# Patient Record
Sex: FEMALE | Race: WHITE | HISPANIC OR LATINO | Employment: FULL TIME | URBAN - METROPOLITAN AREA
[De-identification: names, ages, dates, MRNs, and addresses within clinical notes are randomized per-mention and may not be internally consistent; named-entity substitution may affect disease eponyms.]

---

## 2017-09-30 ENCOUNTER — HOSPITAL ENCOUNTER (EMERGENCY)
Facility: HOSPITAL | Age: 42
Discharge: HOME | End: 2017-09-30
Attending: EMERGENCY MEDICINE
Payer: COMMERCIAL

## 2017-09-30 VITALS
WEIGHT: 265 LBS | HEART RATE: 82 BPM | TEMPERATURE: 98 F | DIASTOLIC BLOOD PRESSURE: 69 MMHG | OXYGEN SATURATION: 98 % | SYSTOLIC BLOOD PRESSURE: 127 MMHG | HEIGHT: 67 IN | BODY MASS INDEX: 41.59 KG/M2 | RESPIRATION RATE: 20 BRPM

## 2017-09-30 DIAGNOSIS — K57.32 DIVERTICULITIS OF LARGE INTESTINE WITHOUT PERFORATION OR ABSCESS WITHOUT BLEEDING: Primary | ICD-10-CM

## 2017-09-30 LAB
ALBUMIN SERPL BCP-MCNC: 3.2 G/DL
ALP SERPL-CCNC: 79 U/L
ALT SERPL W/O P-5'-P-CCNC: 38 U/L
ANION GAP SERPL CALC-SCNC: 7 MMOL/L
AST SERPL-CCNC: 26 U/L
B-HCG UR QL: NEGATIVE
BASOPHILS # BLD AUTO: 0.03 K/UL
BASOPHILS NFR BLD: 0.2 %
BILIRUB SERPL-MCNC: 1 MG/DL
BILIRUB UR QL STRIP: NEGATIVE
BUN SERPL-MCNC: 7 MG/DL
CALCIUM SERPL-MCNC: 9.4 MG/DL
CHLORIDE SERPL-SCNC: 106 MMOL/L
CLARITY UR: CLEAR
CO2 SERPL-SCNC: 22 MMOL/L
COLOR UR: YELLOW
CREAT SERPL-MCNC: 0.8 MG/DL
DIFFERENTIAL METHOD: ABNORMAL
EOSINOPHIL # BLD AUTO: 0.3 K/UL
EOSINOPHIL NFR BLD: 2.2 %
ERYTHROCYTE [DISTWIDTH] IN BLOOD BY AUTOMATED COUNT: 14 %
EST. GFR  (AFRICAN AMERICAN): >60 ML/MIN/1.73 M^2
EST. GFR  (NON AFRICAN AMERICAN): >60 ML/MIN/1.73 M^2
GLUCOSE SERPL-MCNC: 114 MG/DL
GLUCOSE UR QL STRIP: NEGATIVE
HCT VFR BLD AUTO: 42.6 %
HGB BLD-MCNC: 14.3 G/DL
HGB UR QL STRIP: ABNORMAL
KETONES UR QL STRIP: ABNORMAL
LEUKOCYTE ESTERASE UR QL STRIP: NEGATIVE
LIPASE SERPL-CCNC: 11 U/L
LYMPHOCYTES # BLD AUTO: 1.1 K/UL
LYMPHOCYTES NFR BLD: 7.7 %
MCH RBC QN AUTO: 29.7 PG
MCHC RBC AUTO-ENTMCNC: 33.6 G/DL
MCV RBC AUTO: 88 FL
MONOCYTES # BLD AUTO: 1.3 K/UL
MONOCYTES NFR BLD: 9.4 %
NEUTROPHILS # BLD AUTO: 11.3 K/UL
NEUTROPHILS NFR BLD: 80.5 %
NITRITE UR QL STRIP: NEGATIVE
PH UR STRIP: 7 [PH] (ref 5–8)
PLATELET # BLD AUTO: 238 K/UL
PMV BLD AUTO: 9.8 FL
POTASSIUM SERPL-SCNC: 4.3 MMOL/L
PROT SERPL-MCNC: 7.9 G/DL
PROT UR QL STRIP: NEGATIVE
RBC # BLD AUTO: 4.82 M/UL
SODIUM SERPL-SCNC: 135 MMOL/L
SP GR UR STRIP: 1.01 (ref 1–1.03)
URN SPEC COLLECT METH UR: ABNORMAL
UROBILINOGEN UR STRIP-ACNC: NEGATIVE EU/DL
WBC # BLD AUTO: 14.05 K/UL

## 2017-09-30 PROCEDURE — 81003 URINALYSIS AUTO W/O SCOPE: CPT

## 2017-09-30 PROCEDURE — 63600175 PHARM REV CODE 636 W HCPCS: Performed by: EMERGENCY MEDICINE

## 2017-09-30 PROCEDURE — 96374 THER/PROPH/DIAG INJ IV PUSH: CPT

## 2017-09-30 PROCEDURE — 85025 COMPLETE CBC W/AUTO DIFF WBC: CPT

## 2017-09-30 PROCEDURE — 80053 COMPREHEN METABOLIC PANEL: CPT

## 2017-09-30 PROCEDURE — 83690 ASSAY OF LIPASE: CPT

## 2017-09-30 PROCEDURE — 81025 URINE PREGNANCY TEST: CPT

## 2017-09-30 PROCEDURE — 99284 EMERGENCY DEPT VISIT MOD MDM: CPT | Mod: 25

## 2017-09-30 RX ORDER — HYDROCODONE BITARTRATE AND ACETAMINOPHEN 5; 325 MG/1; MG/1
1 TABLET ORAL EVERY 4 HOURS PRN
Qty: 12 TABLET | Refills: 0 | Status: SHIPPED | OUTPATIENT
Start: 2017-09-30 | End: 2017-10-10

## 2017-09-30 RX ORDER — CIPROFLOXACIN 500 MG/1
500 TABLET ORAL 2 TIMES DAILY
Qty: 14 TABLET | Refills: 0 | Status: SHIPPED | OUTPATIENT
Start: 2017-09-30 | End: 2018-03-17

## 2017-09-30 RX ORDER — ONDANSETRON 2 MG/ML
4 INJECTION INTRAMUSCULAR; INTRAVENOUS
Status: COMPLETED | OUTPATIENT
Start: 2017-09-30 | End: 2017-09-30

## 2017-09-30 RX ORDER — METRONIDAZOLE 500 MG/1
500 TABLET ORAL 3 TIMES DAILY
Qty: 21 TABLET | Refills: 0 | Status: SHIPPED | OUTPATIENT
Start: 2017-09-30 | End: 2017-10-07

## 2017-09-30 RX ADMIN — ONDANSETRON 4 MG: 2 INJECTION INTRAMUSCULAR; INTRAVENOUS at 06:09

## 2017-09-30 NOTE — ED PROVIDER NOTES
"SCRIBE #1 NOTE: I, Kari Alves, am scribing for, and in the presence of, Alejandro Turpin MD. I have scribed the entire note.      History      Chief Complaint   Patient presents with    Abdominal Pain     reports lower abd pain x 2 days reports going to urgent care and was told she had a UTI and alot of gas in her abd        Review of patient's allergies indicates:   Allergen Reactions    Iodine and iodide containing products         HPI   HPI    9/30/2017, 6:19 AM   History obtained from the patient      History of Present Illness: Brooke Moss is a 42 y.o. female patient who presents to the Emergency Department for low abd pain which onset gradually 2 days ago. Pt describes the pain as "pressure". Pt states that she went to urgent care, had an X-ray and urine tests performed and was told she had ileus and a UTI. Symptoms are constant and moderate in severity. Pt states that passing gas helps mitigate her sxs. No other mitigating or exacerbating factors reported. Associated sxs include nausea. Pt states that she has been having trouble using the restroom and experiences "cold sweats" throughout the night. Patient denies any fever, chills, vomiting, diarrhea, constipation, hematochezia, dysuria, urinary frequency, hematuria, dizziness, and all other sxs at this time. No further complaints or concerns at this time.         Arrival mode: Personal vehicle      PCP: Primary Doctor No       Past Medical History:  History reviewed. No pertinent past medical history.    Past Surgical History:  History reviewed. No pertinent surgical history.      Family History:  History reviewed. No pertinent family history.    Social History:  Social History     Social History Main Topics    Smoking status: Never Smoker    Smokeless tobacco: Never Used    Alcohol use No    Drug use: No    Sexual activity: Unknown       ROS   Review of Systems   Constitutional: Negative for fever.        (+) "cold sweats"   HENT: Negative " "for sore throat.    Respiratory: Negative for shortness of breath.    Cardiovascular: Negative for chest pain.   Gastrointestinal: Positive for abdominal pain and nausea. Negative for blood in stool, constipation, diarrhea and vomiting.        (+) trouble with BM   Genitourinary: Negative for dysuria, frequency and hematuria.   Musculoskeletal: Negative for back pain.   Skin: Negative for rash.   Neurological: Negative for dizziness and weakness.   Hematological: Does not bruise/bleed easily.   All other systems reviewed and are negative.      Physical Exam      Initial Vitals [09/30/17 0552]   BP Pulse Resp Temp SpO2   138/86 104 20 98.6 °F (37 °C) 98 %      MAP       103.33          Physical Exam  Nursing Notes and Vital Signs Reviewed.  Constitutional: Patient is in no acute distress. Well-developed and well-nourished.  Head: Atraumatic. Normocephalic.  Eyes: PERRL. EOM intact. Conjunctivae are not pale. No scleral icterus.  ENT: Mucous membranes are moist. Oropharynx is clear and symmetric.    Neck: Supple. Full ROM. No lymphadenopathy.  Cardiovascular: Regular rate. Regular rhythm. No murmurs, rubs, or gallops. Distal pulses are 2+ and symmetric.  Pulmonary/Chest: No respiratory distress. Clear to auscultation bilaterally. No wheezing, rales, or rhonchi.  Abdominal: Soft and non-distended.  There is low abd tenderness.  No rebound, guarding, or rigidity.   Musculoskeletal: Moves all extremities. No obvious deformities. No edema.   Skin: Warm and dry.  Neurological:  Alert, awake, and appropriate.  Normal speech.  No acute focal neurological deficits are appreciated.  Psychiatric: Normal affect. Good eye contact. Appropriate in content.    ED Course    Procedures  ED Vital Signs:  Vitals:    09/30/17 0552 09/30/17 0708 09/30/17 0831   BP: 138/86 127/69    Pulse: 104 82    Resp: 20     Temp: 98.6 °F (37 °C)  98.2 °F (36.8 °C)   TempSrc: Oral  Oral   SpO2: 98% 98%    Weight: 120.2 kg (265 lb)     Height: 5' 7" " (1.702 m)         Abnormal Lab Results:  Labs Reviewed   CBC W/ AUTO DIFFERENTIAL - Abnormal; Notable for the following:        Result Value    WBC 14.05 (*)     Gran # 11.3 (*)     Mono # 1.3 (*)     Gran% 80.5 (*)     Lymph% 7.7 (*)     All other components within normal limits   COMPREHENSIVE METABOLIC PANEL - Abnormal; Notable for the following:     Sodium 135 (*)     CO2 22 (*)     Glucose 114 (*)     Albumin 3.2 (*)     Anion Gap 7 (*)     All other components within normal limits   URINALYSIS - Abnormal; Notable for the following:     Ketones, UA 1+ (*)     Occult Blood UA Trace (*)     All other components within normal limits   LIPASE   PREGNANCY TEST, URINE RAPID        All Lab Results:  Results for orders placed or performed during the hospital encounter of 09/30/17   CBC W/ AUTO DIFFERENTIAL   Result Value Ref Range    WBC 14.05 (H) 3.90 - 12.70 K/uL    RBC 4.82 4.00 - 5.40 M/uL    Hemoglobin 14.3 12.0 - 16.0 g/dL    Hematocrit 42.6 37.0 - 48.5 %    MCV 88 82 - 98 fL    MCH 29.7 27.0 - 31.0 pg    MCHC 33.6 32.0 - 36.0 g/dL    RDW 14.0 11.5 - 14.5 %    Platelets 238 150 - 350 K/uL    MPV 9.8 9.2 - 12.9 fL    Gran # 11.3 (H) 1.8 - 7.7 K/uL    Lymph # 1.1 1.0 - 4.8 K/uL    Mono # 1.3 (H) 0.3 - 1.0 K/uL    Eos # 0.3 0.0 - 0.5 K/uL    Baso # 0.03 0.00 - 0.20 K/uL    Gran% 80.5 (H) 38.0 - 73.0 %    Lymph% 7.7 (L) 18.0 - 48.0 %    Mono% 9.4 4.0 - 15.0 %    Eosinophil% 2.2 0.0 - 8.0 %    Basophil% 0.2 0.0 - 1.9 %    Differential Method Automated    Comp. Metabolic Panel   Result Value Ref Range    Sodium 135 (L) 136 - 145 mmol/L    Potassium 4.3 3.5 - 5.1 mmol/L    Chloride 106 95 - 110 mmol/L    CO2 22 (L) 23 - 29 mmol/L    Glucose 114 (H) 70 - 110 mg/dL    BUN, Bld 7 6 - 20 mg/dL    Creatinine 0.8 0.5 - 1.4 mg/dL    Calcium 9.4 8.7 - 10.5 mg/dL    Total Protein 7.9 6.0 - 8.4 g/dL    Albumin 3.2 (L) 3.5 - 5.2 g/dL    Total Bilirubin 1.0 0.1 - 1.0 mg/dL    Alkaline Phosphatase 79 55 - 135 U/L    AST 26 10 - 40  U/L    ALT 38 10 - 44 U/L    Anion Gap 7 (L) 8 - 16 mmol/L    eGFR if African American >60 >60 mL/min/1.73 m^2    eGFR if non African American >60 >60 mL/min/1.73 m^2   Lipase   Result Value Ref Range    Lipase 11 4 - 60 U/L   Urinalysis - Clean Catch   Result Value Ref Range    Specimen UA Urine, Clean Catch     Color, UA Yellow Yellow, Straw, Samantha    Appearance, UA Clear Clear    pH, UA 7.0 5.0 - 8.0    Specific Gravity, UA 1.010 1.005 - 1.030    Protein, UA Negative Negative    Glucose, UA Negative Negative    Ketones, UA 1+ (A) Negative    Bilirubin (UA) Negative Negative    Occult Blood UA Trace (A) Negative    Nitrite, UA Negative Negative    Urobilinogen, UA Negative <2.0 EU/dL    Leukocytes, UA Negative Negative   Pregnancy, urine rapid   Result Value Ref Range    Preg Test, Ur Negative          Imaging Results:  Imaging Results          CT Abdomen Pelvis  Without Contrast (Final result)  Result time 09/30/17 07:54:21    Final result by Virgilio Reynolds Jr., MD (09/30/17 07:54:21)                 Impression:             Sigmoid diverticulitis..    All CT scan at this facility use dose modulation, iterative reconstruction, and/or weight base dosing when appropriate to reduce radiation dose to as low as reasonably achievable.      Electronically signed by: VIRGILIO REYNOLDS  Date:     09/30/17  Time:    07:54              Narrative:    Exam: CT abdomen and pelvis without intravenous contrast.    Technique: Axial CT images performed through the abdomen and pelvis without intravenous contrast. Multiplanar reformats were performed and interpreted.    Comparison: None    History:    Lower abdominal pain.    Findings:         Lung bases are clear.  Heart is normal in size.  2.2 cm right renal cyst.  No renal calculus or obstructive uropathy.  Adrenal glands, contracted gallbladder and pancreas are normal.  Liver and spleen are unremarkable.    Multiple diverticula of the sigmoid colon with significant surrounding  mesenteric inflammation.  No free air or abscess formation.  Adnexal cysts likely ovarian.  Urinary bladder is normal.  Osseous structures are intact.                                      The Emergency Provider reviewed the vital signs and test results, which are outlined above.    ED Discussion     8:10 AM: Reassessed pt at this time.  Pt is awake, alert, and in NAD at this time. Discussed with pt all pertinent ED information and results. Discussed pt dx  and plan of tx. Gave pt all f/u and return to the ED instructions. All questions and concerns were addressed at this time. Pt expresses understanding of information and instructions, and is comfortable with plan to discharge. Pt is stable for discharge.        ED Medication(s):  Medications   ondansetron injection 4 mg (4 mg Intravenous Given 9/30/17 0632)       Discharge Medication List as of 9/30/2017  8:10 AM      START taking these medications    Details   ciprofloxacin HCl (CIPRO) 500 MG tablet Take 1 tablet (500 mg total) by mouth 2 (two) times daily., Starting Sat 9/30/2017, Print      hydrocodone-acetaminophen 5-325mg (NORCO) 5-325 mg per tablet Take 1 tablet by mouth every 4 (four) hours as needed for Pain., Starting Sat 9/30/2017, Until Tue 10/10/2017, Print      metronidazole (FLAGYL) 500 MG tablet Take 1 tablet (500 mg total) by mouth 3 (three) times daily., Starting Sat 9/30/2017, Until Sat 10/7/2017, Print             Follow-up Information     your doctor in [place] In 3 days.           [facility]    Specialty:  Emergency Medicine  Why:  As needed, If symptoms worsen  Contact information:  [address]  [facility] [address] [phone]                   Medical Decision Making    Medical Decision Making:   Clinical Tests:   Lab Tests: Ordered and Reviewed  Radiological Study: Ordered and Reviewed           Scribe Attestation:   Scribe #1: I performed the above scribed service and the documentation  accurately describes the services I performed. I attest to the accuracy of the note.    Attending:   Physician Attestation Statement for Scribe #1: I, Alejandro Turpin MD, personally performed the services described in this documentation, as scribed by Kari Alves, in my presence, and it is both accurate and complete.          Clinical Impression       ICD-10-CM ICD-9-CM   1. Diverticulitis of large intestine without perforation or abscess without bleeding K57.32 562.11       Disposition:   Disposition: Discharged  Condition: Stable         Alejandro Tuprin MD  09/30/17 1323

## 2018-01-12 ENCOUNTER — OFFICE VISIT (OUTPATIENT)
Dept: URGENT CARE | Facility: CLINIC | Age: 43
End: 2018-01-12
Payer: COMMERCIAL

## 2018-01-12 VITALS
WEIGHT: 210 LBS | TEMPERATURE: 98 F | BODY MASS INDEX: 32.96 KG/M2 | DIASTOLIC BLOOD PRESSURE: 86 MMHG | OXYGEN SATURATION: 95 % | RESPIRATION RATE: 18 BRPM | HEIGHT: 67 IN | SYSTOLIC BLOOD PRESSURE: 147 MMHG | HEART RATE: 94 BPM

## 2018-01-12 DIAGNOSIS — R05.9 COUGH: Primary | ICD-10-CM

## 2018-01-12 DIAGNOSIS — J11.1 INFLUENZA: ICD-10-CM

## 2018-01-12 LAB
CTP QC/QA: YES
FLUAV AG NPH QL: POSITIVE
FLUBV AG NPH QL: NEGATIVE

## 2018-01-12 PROCEDURE — 87804 INFLUENZA ASSAY W/OPTIC: CPT | Mod: QW,S$GLB,, | Performed by: EMERGENCY MEDICINE

## 2018-01-12 PROCEDURE — 99203 OFFICE O/P NEW LOW 30 MIN: CPT | Mod: S$GLB,,, | Performed by: EMERGENCY MEDICINE

## 2018-01-12 RX ORDER — BENZONATATE 200 MG/1
200 CAPSULE ORAL 3 TIMES DAILY PRN
Qty: 30 CAPSULE | Refills: 0 | Status: SHIPPED | OUTPATIENT
Start: 2018-01-12 | End: 2018-01-22

## 2018-01-12 RX ORDER — CODEINE PHOSPHATE AND GUAIFENESIN 10; 100 MG/5ML; MG/5ML
10 SOLUTION ORAL EVERY 6 HOURS PRN
Qty: 120 ML | Refills: 0 | Status: SHIPPED | OUTPATIENT
Start: 2018-01-12 | End: 2018-01-22

## 2018-01-12 NOTE — PROGRESS NOTES
"Subjective:       Patient ID: Brooke Moss is a 42 y.o. female.    Vitals:  height is 5' 7" (1.702 m) and weight is 95.3 kg (210 lb). Her oral temperature is 98.3 °F (36.8 °C). Her blood pressure is 147/86 (abnormal) and her pulse is 94. Her respiration is 18 and oxygen saturation is 95%.     Chief Complaint: Cough    Pt sick for over a week. She saw her PCP and got steroid shot and was neg for the flu. The cough got worse 3 days ago and she started feeling bad. No flu shot she does smoke. Tamiflu talk and smoking cessation counseling      Cough   This is a new problem. The current episode started in the past 7 days. The problem has been gradually worsening. The cough is productive of sputum. Associated symptoms include headaches and myalgias. Pertinent negatives include no chest pain, chills, ear pain, eye redness, fever, sore throat, shortness of breath or wheezing. She has tried OTC cough suppressant for the symptoms. The treatment provided no relief. Her past medical history is significant for environmental allergies.     Review of Systems   Constitution: Negative for chills, fever and malaise/fatigue.   HENT: Positive for congestion. Negative for ear pain, hoarse voice and sore throat.    Eyes: Negative for discharge and redness.   Cardiovascular: Negative for chest pain, dyspnea on exertion and leg swelling.   Respiratory: Positive for cough and sputum production. Negative for shortness of breath and wheezing.    Musculoskeletal: Positive for myalgias.   Gastrointestinal: Negative for abdominal pain and nausea.   Neurological: Positive for headaches.   Allergic/Immunologic: Positive for environmental allergies.       Objective:      Physical Exam   Constitutional: She is oriented to person, place, and time. She appears well-developed and well-nourished. She is cooperative.  Non-toxic appearance. She does not appear ill. No distress.   Occasional cough   HENT:   Head: Normocephalic and atraumatic.   Right " Ear: Hearing, tympanic membrane, external ear and ear canal normal.   Left Ear: Hearing, tympanic membrane, external ear and ear canal normal.   Nose: Mucosal edema and rhinorrhea present. No nasal deformity. No epistaxis. Right sinus exhibits no maxillary sinus tenderness and no frontal sinus tenderness. Left sinus exhibits no maxillary sinus tenderness and no frontal sinus tenderness.   Mouth/Throat: Uvula is midline and mucous membranes are normal. No trismus in the jaw. Normal dentition. No uvula swelling. Posterior oropharyngeal erythema present.   Eyes: Conjunctivae, EOM and lids are normal. Pupils are equal, round, and reactive to light. No scleral icterus.   Sclera clear bilat   Neck: Trachea normal, normal range of motion, full passive range of motion without pain and phonation normal. Neck supple.   Cardiovascular: Normal rate, regular rhythm, normal heart sounds, intact distal pulses and normal pulses.    Pulmonary/Chest: Effort normal and breath sounds normal. No respiratory distress.   Abdominal: Soft. Normal appearance and bowel sounds are normal. She exhibits no distension. There is no tenderness.   Musculoskeletal: Normal range of motion. She exhibits no edema or deformity.   Neurological: She is alert and oriented to person, place, and time. She exhibits normal muscle tone. Coordination normal.   Skin: Skin is warm, dry and intact. She is not diaphoretic. No pallor.   Psychiatric: She has a normal mood and affect. Her speech is normal and behavior is normal. Judgment and thought content normal. Cognition and memory are normal.   Nursing note and vitals reviewed.      Office Visit on 01/12/2018   Component Date Value Ref Range Status    Rapid Influenza A Ag 01/12/2018 Positive* Negative Final    Rapid Influenza B Ag 01/12/2018 Negative  Negative Final     Acceptable 01/12/2018 Yes   Final     Assessment:       1. Cough    2. Influenza        Plan:         Cough  -     POCT Influenza  A/B    Influenza    Other orders  -     benzonatate (TESSALON) 200 MG capsule; Take 1 capsule (200 mg total) by mouth 3 (three) times daily as needed for Cough.  Dispense: 30 capsule; Refill: 0  -     guaifenesin-codeine 100-10 mg/5 ml (TUSSI-ORGANIDIN NR)  mg/5 mL syrup; Take 10 mLs by mouth every 6 (six) hours as needed for Cough.  Dispense: 120 mL; Refill: 0

## 2018-01-12 NOTE — PATIENT INSTRUCTIONS
Please be aware as we discussed that narcotics can be addictive. I have given you a limited quantity to take as it is needed at this time. However take it sparingly and only when needed.  Influenza (Adult)    Influenza is also called the flu. It is a viral illness that affects the air passages of your lungs. It is different from the common cold. The flu can easily be passed from one to person to another. It may be spread through the air by coughing and sneezing. Or it can be spread by touching the sick person and then touching your own eyes, nose, or mouth.  The flu starts 1 to 3 days after you are exposed to the flu virus. It may last for 1 to 2 weeks but many people feel tired or fatigued for many weeks afterward. You usually dont need to take antibiotics unless you have a complication. This might be an ear or sinus infection or pneumonia.  Symptoms of the flu may be mild or severe. They can include extreme tiredness (wanting to stay in bed all day), chills, fevers, muscle aches, soreness with eye movement, headache, and a dry, hacking cough.  Home care  Follow these guidelines when caring for yourself at home:  · Avoid being around cigarette smoke, whether yours or other peoples.  · Acetaminophen or ibuprofen will help ease your fever, muscle aches, and headache. Dont give aspirin to anyone younger than 18 who has the flu. Aspirin can harm the liver.  · Nausea and loss of appetite are common with the flu. Eat light meals. Drink 6 to 8 glasses of liquids every day. Good choices are water, sport drinks, soft drinks without caffeine, juices, tea, and soup. Extra fluids will also help loosen secretions in your nose and lungs.  · Over-the-counter cold medicines will not make the flu go away faster. But the medicines may help with coughing, sore throat, and congestion in your nose and sinuses. Dont use a decongestant if you have high blood pressure.  · Stay home until your fever has been gone for at least 24 hours  without using medicine to reduce fever.  Follow-up care  Follow up with your healthcare provider, or as advised, if you are not getting better over the next week.  If you are age 65 or older, talk with your provider about getting a pneumococcal vaccine every 5 years. You should also get this vaccine if you have chronic asthma or COPD. All adults should get a flu vaccine every fall. Ask your provider about this.  When to seek medical advice  Call your healthcare provider right away if any of these occur:  · Cough with lots of colored mucus (sputum) or blood in your mucus  · Chest pain, shortness of breath, wheezing, or trouble breathing  · Severe headache, or face, neck, or ear pain  · New rash with fever  · Fever of 100.4°F (38°C) or higher, or as directed by your healthcare provider  · Confusion, behavior change, or seizure  · Severe weakness or dizziness  · You get a new fever or cough after getting better for a few days  Date Last Reviewed: 1/1/2017  © 0066-7857 [employer]. [address] [place] [address]. All rights reserved. This information is not intended as a substitute for professional medical care. Always follow your healthcare professional's instructions.        Kicking the Smoking Habit  If you smoke, quitting is one of the best changes you can make for your heart and your overall health. Your risk of heart attack goes down within one day of putting out that last cigarette. As you go longer without smoking, your risk goes down even more. Quitting isnt easy, but millions of people have done it. You can, too. Its never too late to quit.  Getting started  Boost your chances of success by deciding on your quit plan. Your health care provider and cardiac rehab team can help you develop this plan. Even if youve already quit, its easy to slip back into smoking.  Your plan can help you avoid and recover from relapse.  In any case, start by setting a date to quit within a month, and  do it.    Keys to your quit plan  · Talk to your healthcare provider about prescription medicines and nicotine replacement products that help stop the urge to smoke.   · Join a support group or quit smoking program. Talking with others about the challenges of quitting can help you get through them.  · Ask other smokers in your household to quit with you.  · Look for the cues in your life that you associate with smoking and avoid them.  Track your triggers  What gives you that Q-wobs-m-cigarette feeling? List all the situations that make you want a cigarette. Then think of other ways to deal with these situations. Here are some examples:  Situation How I'll handle it   Finishing a meal Get up from the table and take a walk   Having an argument Find a quiet place and breathe deeply   Feeling lonely or bored Call a friend to talk         Tips for quitting successfully  · List the benefits of quitting such as reducing heart risks and saving money. Keep this list and review it whenever you feel like smoking.  · Get support. Let your friends know you may call them to chat when you have an urge to smoke.  · If youve tried to quit before without success, this time avoid the triggers that may cause the relapse.  · Make the most of slip-ups. Try to learn from them, and then get back on track.  · Be accountable to your friends and your calendar so that you stay on track.  For family and friends  · Be supportive and patient. Quitting smoking can be difficult and stressful.  · If you smoke, nows a great time to quit. Even if you dont quit, never smoke around your loved one. Secondhand smoke is dangerous to his or her heart.  · The best goals are accomplished in teams. Remember that when your loved one states he or she wants to stop smoking.  Date Last Reviewed: 7/1/2016  © 5357-4394 [employer]. [address] [place] [address], [place] [address]. All rights reserved. This information is not intended as a substitute for  professional medical care. Always follow your healthcare professional's instructions.

## 2018-01-12 NOTE — LETTER
January 12, 2018      [facility]  [address] [address]  Phone: [phone]  Fax: [phone]       Patient: Brooke Moss   YOB: 1975  Date of Visit: 01/12/2018    To Whom It May Concern:    Mary Moss  was at [facility] on 01/12/2018. She has the flu and is contagious. She can return to work 1/16/18 if no fever for 24 hours. If you have any questions or concerns, or if I can be of further assistance, please do not hesitate to contact me.    Sincerely,    Mireille Skaggs MD

## 2018-03-17 ENCOUNTER — OFFICE VISIT (OUTPATIENT)
Dept: URGENT CARE | Facility: CLINIC | Age: 43
End: 2018-03-17
Payer: COMMERCIAL

## 2018-03-17 VITALS
SYSTOLIC BLOOD PRESSURE: 126 MMHG | RESPIRATION RATE: 19 BRPM | BODY MASS INDEX: 32.96 KG/M2 | TEMPERATURE: 97 F | HEART RATE: 92 BPM | HEIGHT: 67 IN | DIASTOLIC BLOOD PRESSURE: 76 MMHG | WEIGHT: 210 LBS | OXYGEN SATURATION: 96 %

## 2018-03-17 DIAGNOSIS — L05.01 PILONIDAL ABSCESS: Primary | ICD-10-CM

## 2018-03-17 PROCEDURE — 99214 OFFICE O/P EST MOD 30 MIN: CPT | Mod: S$GLB,,, | Performed by: PHYSICIAN ASSISTANT

## 2018-03-17 RX ORDER — SULFAMETHOXAZOLE AND TRIMETHOPRIM 800; 160 MG/1; MG/1
1 TABLET ORAL 2 TIMES DAILY
Qty: 14 TABLET | Refills: 0 | Status: SHIPPED | OUTPATIENT
Start: 2018-03-17 | End: 2018-03-24

## 2018-03-17 NOTE — LETTER
March 17, 2018      [facility] [place]  [address]  [place] [address]  Phone: [phone]  Fax: [phone]       Patient: Brooke Moss   YOB: 1975  Date of Visit: 03/17/2018    To Whom It May Concern:    Mary Moss  was at [facility] on 03/17/2018. She may return to work/school on 3/19/2018 with no restrictions. If you have any questions or concerns, or if I can be of further assistance, please do not hesitate to contact me.    Sincerely,    Winsome Tamayo PA-C

## 2018-03-17 NOTE — PROGRESS NOTES
"Subjective:       Patient ID: Brooke Moss is a 43 y.o. female.    Vitals:  height is 5' 7" (1.702 m) and weight is 95.3 kg (210 lb). Her oral temperature is 97 °F (36.1 °C). Her blood pressure is 126/76 and her pulse is 92. Her respiration is 19 and oxygen saturation is 96%.     Chief Complaint: Cyst (Pilonidal cyst on tailbone)    Cyst   This is a new problem. Episode onset: Thursday. The problem occurs constantly. The problem has been unchanged. Pertinent negatives include no abdominal pain, anorexia, arthralgias, change in bowel habit, chest pain, chills, congestion, coughing, fatigue, fever, headaches, nausea, rash, sore throat or vomiting. The symptoms are aggravated by bending and standing (sitting). She has tried nothing for the symptoms.     Review of Systems   Constitution: Negative for chills, fatigue and fever.   HENT: Negative for congestion and sore throat.    Eyes: Negative for blurred vision.   Cardiovascular: Negative for chest pain.   Respiratory: Negative for cough and shortness of breath.    Skin: Negative for color change and rash.        Abscess on buttock   Musculoskeletal: Negative for arthralgias, back pain and joint pain.   Gastrointestinal: Negative for abdominal pain, anorexia, change in bowel habit, diarrhea, nausea and vomiting.   Neurological: Negative for headaches.   Psychiatric/Behavioral: The patient is not nervous/anxious.        Objective:      Physical Exam   Constitutional: She is oriented to person, place, and time. Vital signs are normal. She appears well-developed and well-nourished. She does not appear ill. No distress.   HENT:   Head: Normocephalic and atraumatic.   Right Ear: External ear normal.   Left Ear: External ear normal.   Nose: Nose normal.   Eyes: Conjunctivae, EOM and lids are normal. Right eye exhibits no discharge. Left eye exhibits no discharge.   Neck: Normal range of motion. Neck supple.   Cardiovascular: Normal rate, regular rhythm and normal heart " sounds.  Exam reveals no gallop and no friction rub.    No murmur heard.  Pulmonary/Chest: Effort normal and breath sounds normal. No respiratory distress. She has no wheezes. She has no rales.   Musculoskeletal: Normal range of motion.   Neurological: She is alert and oriented to person, place, and time.   Skin: Skin is warm and dry. No rash noted. She is not diaphoretic. No erythema. No pallor.        Abscess at proximal left intergluteal cleft    Incision and drainage    Verbal consent obtained.  Prior to procedure correct patient, procedure, and site verfied.  Incision with 11 blade.   Local anesthesia: 3 mL 1% lidocaine without epi  Pilonidal abscess  Moderate amount of purulent and bloody drainage.  Probed with forceps.  Packed with iodoform  Cleansed and bandaged.  Pt tolerated procedure well.     Apply hot compresses frequently to promote drainage.  Oral antibiotics -- see med orders.  I & D procedure as above.  RTC in 2 days for packing removal.   Psychiatric: She has a normal mood and affect. Her behavior is normal.   Nursing note and vitals reviewed.      Assessment:       1. Pilonidal abscess        Plan:       Discussed wound care with patient. Discussed treatment options with patient. Patient expressed verbal understanding and agreement with treatment plan.     Pilonidal abscess  -     sulfamethoxazole-trimethoprim 800-160mg (BACTRIM DS) 800-160 mg Tab; Take 1 tablet by mouth 2 (two) times daily.  Dispense: 14 tablet; Refill: 0      Patient Instructions   -Please take antibiotic to completion.  -Return in 2 days to have packing removed.   -Return to clinic for any worsening symptoms as discussed.    Please follow up with your primary care provider within 2-5 days if your signs and symptoms have not resolved or worsen.     If your condition worsens or fails to improve we recommend that you receive another evaluation at the emergency room immediately or contact your primary medical clinic to discuss your  concerns.   You must understand that you have received an Urgent Care treatment only and that you may be released before all of your medical problems are known or treated. You, the patient, will arrange for follow up care as instructed.         Infected Pilonidal Cyst (Incision & Drainage)  A pilonidal cyst is a swelling that starts under the skin on the sacrum near the tail bone. It may look like a small dimple. It can fill with skin oils, hair, and dead skin cells. It may stay small or grow larger. Because it often has an opening to the surface, it may become infected with normal skin bacteria.  Cause  The cause of pilonidal cysts has been debated since they were first recognized. It may be present at birth and go unnoticed. Injury, rubbing, or skin irritation may also cause pilonidal cysts. It can also be caused by an ingrown hair. Most likely, the cause is a combination of these things. Because some injury or irritation can lead to pilonidal cysts, it can be more common in people who sit or drive a lot for work.  Symptoms  A pilonidal cyst may be small and painless. If it is inflamed or infected, you may have these symptoms:  · Swelling  · Irritation or redness  · Pain  · Drainage  The cyst can swell and drain on its own. The swelling and drainage can come and go.  Treatment  Your pilonidal cyst was drained with a small incision using local anesthesia.  After the incision and drainage, gauze packing may be inserted into the opening. If so, it should be removed in 1 to 2 days. Antibiotics are not required in the treatment of a simple abscess, unless the infection is spreading into the skin around the wound. The wound will take about 1 to 2 weeks to heal depending on the size of the cyst.  Home care  Wound care  · Pus may drain from the wound for the first few days. Cover the wound with a clean dry bandage. Change the bandage if it becomes soaked with blood or pus, or if it gets soiled with feces or urine.  · Sit  in a tub filled with about 6 inches of hot water. Keep the water hot for 10 to15 minutes.  · If gauze packing was placed inside the cyst cavity, you may be told to remove it yourself. You may do this in the shower. Once the packing is removed, you should wash the area carefully in the shower once a day. Do this until the skin opening has closed. It is okay to direct the shower spray directly into the opening if this is not too painful.  Medicines  · Take acetaminophen or ibuprofen for pain, unless you were given a different pain medicine to use. Talk with your doctor before using these medicines if you have chronic liver or kidney disease or have ever had a stomach ulcer or gastrointestinal bleeding. Also talk with your doctor if you are taking blood thinner medicines.  · If you were given antibiotics, take them until they are gone. It is important to finish the antibiotics even if the wound looks better. This is to make sure the infection has cleared.  · Use antibiotic cream or ointment if your healthcare provider tells you to do so.  Prevention  Once this infection has healed, the following may decrease the risk of future infections:  · Keep the area of the cyst clean by bathing or showering daily.  · Avoid tight-fitting clothing to minimize perspiration and irritation of the skin.  · Recurrent pilonidal cysts may be completely removed by surgery. But this can only be done at a time when there is no infection. Ask your doctor for more information.  Follow-up care  Follow up with your healthcare provider, or as advised. If a gauze packing was inserted in your wound, it should be removed in 1 to 2 days. Check your wound every day for the signs listed below.  When to seek medical advice  Call your healthcare provider right away if any of these occur:  · Pus continues to come from the cyst for 5 days after the incision  · Increasing redness, local pain, or swelling  · Fever of 100.4°F (38.0°C) or higher for more than  2 days, or as directed by your healthcare provider  Date Last Reviewed: 12/1/2016  © 6812-3033 [employer]. [address] [place] [address], [place] [address]. All rights reserved. This information is not intended as a substitute for professional medical care. Always follow your healthcare professional's instructions.

## 2018-03-17 NOTE — PATIENT INSTRUCTIONS
-Please take antibiotic to completion.  -Return in 2 days to have packing removed.   -Return to clinic for any worsening symptoms as discussed.    Please follow up with your primary care provider within 2-5 days if your signs and symptoms have not resolved or worsen.     If your condition worsens or fails to improve we recommend that you receive another evaluation at the emergency room immediately or contact your primary medical clinic to discuss your concerns.   You must understand that you have received an Urgent Care treatment only and that you may be released before all of your medical problems are known or treated. You, the patient, will arrange for follow up care as instructed.         Infected Pilonidal Cyst (Incision & Drainage)  A pilonidal cyst is a swelling that starts under the skin on the sacrum near the tail bone. It may look like a small dimple. It can fill with skin oils, hair, and dead skin cells. It may stay small or grow larger. Because it often has an opening to the surface, it may become infected with normal skin bacteria.  Cause  The cause of pilonidal cysts has been debated since they were first recognized. It may be present at birth and go unnoticed. Injury, rubbing, or skin irritation may also cause pilonidal cysts. It can also be caused by an ingrown hair. Most likely, the cause is a combination of these things. Because some injury or irritation can lead to pilonidal cysts, it can be more common in people who sit or drive a lot for work.  Symptoms  A pilonidal cyst may be small and painless. If it is inflamed or infected, you may have these symptoms:  · Swelling  · Irritation or redness  · Pain  · Drainage  The cyst can swell and drain on its own. The swelling and drainage can come and go.  Treatment  Your pilonidal cyst was drained with a small incision using local anesthesia.  After the incision and drainage, gauze packing may be inserted into the opening. If so, it should be removed in 1  to 2 days. Antibiotics are not required in the treatment of a simple abscess, unless the infection is spreading into the skin around the wound. The wound will take about 1 to 2 weeks to heal depending on the size of the cyst.  Home care  Wound care  · Pus may drain from the wound for the first few days. Cover the wound with a clean dry bandage. Change the bandage if it becomes soaked with blood or pus, or if it gets soiled with feces or urine.  · Sit in a tub filled with about 6 inches of hot water. Keep the water hot for 10 to15 minutes.  · If gauze packing was placed inside the cyst cavity, you may be told to remove it yourself. You may do this in the shower. Once the packing is removed, you should wash the area carefully in the shower once a day. Do this until the skin opening has closed. It is okay to direct the shower spray directly into the opening if this is not too painful.  Medicines  · Take acetaminophen or ibuprofen for pain, unless you were given a different pain medicine to use. Talk with your doctor before using these medicines if you have chronic liver or kidney disease or have ever had a stomach ulcer or gastrointestinal bleeding. Also talk with your doctor if you are taking blood thinner medicines.  · If you were given antibiotics, take them until they are gone. It is important to finish the antibiotics even if the wound looks better. This is to make sure the infection has cleared.  · Use antibiotic cream or ointment if your healthcare provider tells you to do so.  Prevention  Once this infection has healed, the following may decrease the risk of future infections:  · Keep the area of the cyst clean by bathing or showering daily.  · Avoid tight-fitting clothing to minimize perspiration and irritation of the skin.  · Recurrent pilonidal cysts may be completely removed by surgery. But this can only be done at a time when there is no infection. Ask your doctor for more information.  Follow-up  care  Follow up with your healthcare provider, or as advised. If a gauze packing was inserted in your wound, it should be removed in 1 to 2 days. Check your wound every day for the signs listed below.  When to seek medical advice  Call your healthcare provider right away if any of these occur:  · Pus continues to come from the cyst for 5 days after the incision  · Increasing redness, local pain, or swelling  · Fever of 100.4°F (38.0°C) or higher for more than 2 days, or as directed by your healthcare provider  Date Last Reviewed: 12/1/2016  © 3646-1218 [employer]. [address] [place] [address], [place] [address]. All rights reserved. This information is not intended as a substitute for professional medical care. Always follow your healthcare professional's instructions.

## 2018-03-19 ENCOUNTER — CLINICAL SUPPORT (OUTPATIENT)
Dept: URGENT CARE | Facility: CLINIC | Age: 43
End: 2018-03-19
Payer: COMMERCIAL

## 2018-03-19 VITALS
HEIGHT: 67 IN | HEART RATE: 89 BPM | RESPIRATION RATE: 18 BRPM | WEIGHT: 215 LBS | DIASTOLIC BLOOD PRESSURE: 70 MMHG | BODY MASS INDEX: 33.74 KG/M2 | OXYGEN SATURATION: 97 % | SYSTOLIC BLOOD PRESSURE: 132 MMHG

## 2018-03-19 DIAGNOSIS — L05.01 PILONIDAL CYST WITH ABSCESS: Primary | ICD-10-CM

## 2018-03-19 PROCEDURE — 99499 UNLISTED E&M SERVICE: CPT | Mod: S$GLB,,, | Performed by: FAMILY MEDICINE

## 2018-03-19 RX ORDER — MUPIROCIN 20 MG/G
OINTMENT TOPICAL 4 TIMES DAILY
Qty: 1 TUBE | Refills: 0 | Status: ON HOLD | OUTPATIENT
Start: 2018-03-19 | End: 2023-01-19 | Stop reason: HOSPADM

## 2018-03-19 NOTE — PROGRESS NOTES
"Subjective:       Patient ID: Brooke Moss is a 43 y.o. female.    Vitals:  height is 5' 7" (1.702 m) and weight is 97.5 kg (215 lb). Her blood pressure is 132/70 and her pulse is 89. Her respiration is 18 and oxygen saturation is 97%.     Chief Complaint: Wound Check    Pt presents for check on an abscess  Packing fell out accidentally on saturday      Wound Check   She was originally treated 2 to 3 days ago. Previous treatment included I&D of abscess. Her temperature was unmeasured prior to arrival. There has been bloody and colored discharge from the wound. The redness has not changed. The swelling has not changed.     Review of Systems   Constitution: Negative for chills and fever.   HENT: Negative for sore throat.    Respiratory: Negative for shortness of breath.    Skin: Positive for color change. Negative for itching and rash.   Musculoskeletal: Negative for joint pain.       Objective:      Physical Exam   Constitutional: She is oriented to person, place, and time. She appears well-developed and well-nourished.   HENT:   Head: Normocephalic and atraumatic. Head is without abrasion, without contusion and without laceration.   Nose: Nose normal.   Eyes: Conjunctivae, EOM and lids are normal. Pupils are equal, round, and reactive to light.   Neck: Trachea normal, full passive range of motion without pain and phonation normal. Neck supple.   Cardiovascular: Normal rate, regular rhythm and normal heart sounds.    Pulmonary/Chest: Effort normal and breath sounds normal. No stridor. No respiratory distress.   Musculoskeletal: Normal range of motion.   Neurological: She is alert and oriented to person, place, and time.   Skin: Skin is warm, dry and intact. Capillary refill takes less than 2 seconds. No abrasion, no bruising, no burn, no ecchymosis, no laceration, no lesion and no rash noted. No erythema.        Psychiatric: She has a normal mood and affect. Her speech is normal and behavior is normal. Judgment " "and thought content normal. Cognition and memory are normal.   Nursing note and vitals reviewed.      Incision & Drainage  Date/Time: 3/19/2018 4:53 PM  Performed by: ANJEL GOTTLIEB.  Authorized by: ANJEL GOTTLIEB     Time out: Immediately prior to procedure a "time out" was called to verify the correct patient, procedure, equipment, support staff and site/side marked as required.    Consent Done?:  Yes (Verbal)    Type:  Abscess  Body area:  Anogenital  Location details:  Gluteal cleft  Anesthesia:  Local infiltration  Local anesthetic:  Lidocaine 2% without epinephrinelidocaine 2% without epinephrine  Anesthetic total (ml):  5  Scalpel size:  11  Incision type:  Single straight  Complexity:  Simple  Drainage:  Pus and serosanguinous  Drainage amount:  Moderate  Wound treatment:  Incision, drainage, expression of material and wound packed  Packing material:  1/2 in iodoform gauze (pt did not have reaction to iodoform previously packed with)  Patient tolerance:  Patient tolerated the procedure well with no immediate complications        Assessment:       1. Pilonidal cyst with abscess        Plan:         Pilonidal cyst with abscess  -     mupirocin (BACTROBAN) 2 % ointment; Apply topically 4 (four) times daily.  Dispense: 1 Tube; Refill: 0    Other orders  -     INCISION AND DRAINAGE    return in 2 days for packing change.   Given written Rx for 3 more days of bactrim.     Patient Instructions   Please return in 2 days for packing removal.   Keep the area clean do not remove packing.   Infected Pilonidal Cyst (Incision & Drainage)  A pilonidal cyst is a swelling that starts under the skin on the sacrum near the tail bone. It may look like a small dimple. It can fill with skin oils, hair, and dead skin cells. It may stay small or grow larger. Because it often has an opening to the surface, it may become infected with normal skin bacteria.  Cause  The cause of pilonidal cysts has been debated since they were " first recognized. It may be present at birth and go unnoticed. Injury, rubbing, or skin irritation may also cause pilonidal cysts. It can also be caused by an ingrown hair. Most likely, the cause is a combination of these things. Because some injury or irritation can lead to pilonidal cysts, it can be more common in people who sit or drive a lot for work.  Symptoms  A pilonidal cyst may be small and painless. If it is inflamed or infected, you may have these symptoms:  · Swelling  · Irritation or redness  · Pain  · Drainage  The cyst can swell and drain on its own. The swelling and drainage can come and go.  Treatment  Your pilonidal cyst was drained with a small incision using local anesthesia.  After the incision and drainage, gauze packing may be inserted into the opening. If so, it should be removed in 1 to 2 days. Antibiotics are not required in the treatment of a simple abscess, unless the infection is spreading into the skin around the wound. The wound will take about 1 to 2 weeks to heal depending on the size of the cyst.  Home care  Wound care  · Pus may drain from the wound for the first few days. Cover the wound with a clean dry bandage. Change the bandage if it becomes soaked with blood or pus, or if it gets soiled with feces or urine.  · Sit in a tub filled with about 6 inches of hot water. Keep the water hot for 10 to15 minutes.  · If gauze packing was placed inside the cyst cavity, you may be told to remove it yourself. You may do this in the shower. Once the packing is removed, you should wash the area carefully in the shower once a day. Do this until the skin opening has closed. It is okay to direct the shower spray directly into the opening if this is not too painful.  Medicines  · Take acetaminophen or ibuprofen for pain, unless you were given a different pain medicine to use. Talk with your doctor before using these medicines if you have chronic liver or kidney disease or have ever had a stomach  ulcer or gastrointestinal bleeding. Also talk with your doctor if you are taking blood thinner medicines.  · If you were given antibiotics, take them until they are gone. It is important to finish the antibiotics even if the wound looks better. This is to make sure the infection has cleared.  · Use antibiotic cream or ointment if your healthcare provider tells you to do so.  Prevention  Once this infection has healed, the following may decrease the risk of future infections:  · Keep the area of the cyst clean by bathing or showering daily.  · Avoid tight-fitting clothing to minimize perspiration and irritation of the skin.  · Recurrent pilonidal cysts may be completely removed by surgery. But this can only be done at a time when there is no infection. Ask your doctor for more information.  Follow-up care  Follow up with your healthcare provider, or as advised. If a gauze packing was inserted in your wound, it should be removed in 1 to 2 days. Check your wound every day for the signs listed below.  When to seek medical advice  Call your healthcare provider right away if any of these occur:  · Pus continues to come from the cyst for 5 days after the incision  · Increasing redness, local pain, or swelling  · Fever of 100.4°F (38.0°C) or higher for more than 2 days, or as directed by your healthcare provider  Date Last Reviewed: 12/1/2016  © 5942-6837 [employer]. [address] [place] [address], [place] [address]. All rights reserved. This information is not intended as a substitute for professional medical care. Always follow your healthcare professional's instructions.        Pilonidal Cyst, Infected (Antibiotic Treatment)  A pilonidal cyst is a swelling that starts under the skin on the sacrum near the tailbone. It may look like a small dimple. It can fill with skin oils, hair, and dead skin cells. It may stay small or grow larger. It may become infected with normal skin bacteria because it often has an opening  to the surface.  Causes  The cause of pilonidal cysts has been debated since they were first recognized. A cyst may be present at birth and go unnoticed. Injury, rubbing, or skin irritation may also cause pilonidal cysts. It can also be caused by an ingrown hair. The cause is most likely a combination of these things. Because some injury or irritation can lead to pilonidal cysts, they can be more common in people who sit or drive a lot for work.  Symptoms  A pilonidal cyst may be small and painless. If it becomes inflamed or infected, you may have these symptoms:  · Swelling  · Irritation or redness  · Pain  · Drainage  The cyst can swell and drain on its own. The swelling and drainage can come and go.  Treatment  A limited infection can be treated with antibiotics and home care. You have been given antibiotics to treat your infected pilonidal cyst.  Home care  The following guidelines will help you care for your wound at home:  · Sit in a tub filled with about 6 inches of hot water. Keep the water hot for 10 to 15 minutes.  · Don't squeeze the pilonidal cyst or stick a needle in it to drain it. This will make the infection worse, or spread it.  · Cover the cyst with a pad or something similar to keep it from becoming more irritated, damaged, and painful.  Medicines  · Take acetaminophen or ibuprofen for pain, unless you were given a different pain medicine to use. Talk with your doctor before using these medicines if you have chronic liver or kidney disease, or have ever had a stomach ulcer or digestive bleeding. Also talk with your doctor if you are taking blood thinner medicines.  · Take the antibiotics that you were prescribed until they are all gone. To make sure the infection is cured, it is important to finish the antibiotics even if the wound looks better.  · Use antibiotic cream or ointment if your healthcare provider tells you to.    Preventing future infections  Once this infection has healed, follow  these tips to lower the risk for another infection:  · Keep the area of the cyst clean by bathing or showering every day.  · Don't wear tight-fitting clothing. This will help lessen sweat and irritation of the skin.  · You may need surgery to completely remove the cyst if it keeps coming back. The surgery can only be done when the cyst is not infected. Ask your doctor for more information.  · Watch for signs of infection listed below so that treatment may be started early.  Follow-up care  Follow up with your healthcare provider, or as advised. Check your wound every day for the signs listed below.  When to seek medical advice  Call your healthcare provider right away if any of these occur:  · Pus coming from the cyst  · Increasing local pain, redness, or swelling  · Fever of 100.4°F (38.0°C) or higher for more than 2 days, or as directed by your healthcare provider  Date Last Reviewed: 12/1/2016  © 2650-6952 [employer]. [address] [place] [address]. All rights reserved. This information is not intended as a substitute for professional medical care. Always follow your healthcare professional's instructions.        Wound Care After Packing Removal or Replacement  Packing is a special type of dressing placed inside a wound to help it heal. Once the packing is removed, you need to care for your wound. Good wound care helps prevent infection. Be sure to keep all follow-up appointments with your healthcare provider. Follow these instructions to take care of the wound once youre at home.  Home care  Your healthcare provider may prescribe medicines for pain. Or he or she may suggest an over-the-counter (OTC) pain reliever, such as ibuprofen or acetaminophen. Talk with your healthcare provider before taking any OTC medicines if you have chronic liver or kidney disease, a stomach ulcer, or gastrointestinal bleeding. In certain cases, you may also need to take antibiotics to help prevent  infection. If so, take them exactly as directed for as long as directed. Dont stop taking your antibiotics until they are all gone, even if you feel better.  Here are some general care guidelines for your wound:  · Follow your healthcare providers instructions on how to care for your wound. Always wash your hands with soap and warm water before and after tending to your wound.  · If a bandage was put on, remove and change it once a day or as directed. If the bandage gets wet or dirty, replace it as soon as possible with a new bandage. Use a clean cloth to gently pat the wound dry.  · If your packing was replaced, a small piece of gauze may hang from the wound. It allows fluid to continue draining from the wound. You may need to use an ointment or cream to keep the packing from sticking to the bandage.  · Don't bathe in a tub or soak your wound until your healthcare provider says its OK. Take showers or sponge baths instead. Avoid swimming.  · Dont scratch, rub, scrub, or pick your wound.  · Check your wound daily for the signs of infection listed below.  If your wound was closed, it was likely with one of four types of closures. These include stitches (sutures), strips of surgical tape, skin glue, and staples. Your healthcare provider will decide on the best closure based on the size and location of your wound. Each type of closure needs specific care.  · Sutures. You may want to clean the wound daily after the first 2 to 3 days. To do this, remove the bandage and gently wash the area with soap and warm water. After cleaning, apply a thin layer of antibiotic ointment if recommended. Then put on a new bandage. Sutures on the outside of the skin usually need to be removed by your healthcare provider.  · Surgical tape. Keep the area dry. If it gets wet, blot it dry with a towel. Surgical tape closures usually fall off within 7 to 10 days. If they have not fallen off after 10 days, you can remove them yourself. To  remove the tape, use mineral oil or petroleum jelly on a cotton ball to gently rub the adhesive.  · Skin glue. You may shower or bathe as usual. But do not use soaps, lotions, or ointments on the wound area. Do not scrub the wound. After bathing, pat the wound dry with a soft towel. Do not apply liquids like peroxide, ointments, or creams to the wound while the strips or film is in place. Don't scratch, rub, or pick at the strips or film. Don't put tape directly over the strips or film. Skin adhesive film will fall off naturally in 5 to 10 days. If it does not peel off in 10 days, gently rub petroleum jelly or an ointment onto the film.  · Staples. Take showers or sponge baths. Don't take tub baths. Don't use lotions on the wound area. The area may be cleaned with soap and water 2 to 3 days after the wound was stapled. Don't scrub the wound. Pat it dry with a clean soft cloth or towel. You can use antibiotic ointment if your healthcare provider tells you to. Staples will need to be removed in 10 to 14 days.  Follow-up care  Follow up with your healthcare provider, or as directed. If your packing was replaced, you may need another visit within 1 to 3 days to remove or replace it. If you have sutures or staples, return for their removal as directed.  When to seek medical advice  Call your health care provider right away if you have signs of infection:  · Fever of 1 degree or more above your normal temperature, or as directed by your healthcare provider  · Increasing pain in the wound or pain that doesnt get better even with pain medicine  · Increasing redness or swelling  · Pus or bad-smelling drainage from the wound  Also call your healthcare provider right away if any of these occur:  · Your wound bleeds more than a small amount or wont stop bleeding.  · The edges of your wound come apart.  · You have numbness or weakness in the wound area that doesnt go away.  Date Last Reviewed: 10/2/2015  © 5433-5067 [employer]. [address] [place] [address], [place] [address]. All rights reserved. This information is not intended as a substitute for professional medical care. Always follow your healthcare professional's instructions.

## 2018-03-19 NOTE — PATIENT INSTRUCTIONS
Please return in 2 days for packing removal.   Keep the area clean do not remove packing.   Infected Pilonidal Cyst (Incision & Drainage)  A pilonidal cyst is a swelling that starts under the skin on the sacrum near the tail bone. It may look like a small dimple. It can fill with skin oils, hair, and dead skin cells. It may stay small or grow larger. Because it often has an opening to the surface, it may become infected with normal skin bacteria.  Cause  The cause of pilonidal cysts has been debated since they were first recognized. It may be present at birth and go unnoticed. Injury, rubbing, or skin irritation may also cause pilonidal cysts. It can also be caused by an ingrown hair. Most likely, the cause is a combination of these things. Because some injury or irritation can lead to pilonidal cysts, it can be more common in people who sit or drive a lot for work.  Symptoms  A pilonidal cyst may be small and painless. If it is inflamed or infected, you may have these symptoms:  · Swelling  · Irritation or redness  · Pain  · Drainage  The cyst can swell and drain on its own. The swelling and drainage can come and go.  Treatment  Your pilonidal cyst was drained with a small incision using local anesthesia.  After the incision and drainage, gauze packing may be inserted into the opening. If so, it should be removed in 1 to 2 days. Antibiotics are not required in the treatment of a simple abscess, unless the infection is spreading into the skin around the wound. The wound will take about 1 to 2 weeks to heal depending on the size of the cyst.  Home care  Wound care  · Pus may drain from the wound for the first few days. Cover the wound with a clean dry bandage. Change the bandage if it becomes soaked with blood or pus, or if it gets soiled with feces or urine.  · Sit in a tub filled with about 6 inches of hot water. Keep the water hot for 10 to15 minutes.  · If gauze packing was placed inside the cyst cavity, you may  be told to remove it yourself. You may do this in the shower. Once the packing is removed, you should wash the area carefully in the shower once a day. Do this until the skin opening has closed. It is okay to direct the shower spray directly into the opening if this is not too painful.  Medicines  · Take acetaminophen or ibuprofen for pain, unless you were given a different pain medicine to use. Talk with your doctor before using these medicines if you have chronic liver or kidney disease or have ever had a stomach ulcer or gastrointestinal bleeding. Also talk with your doctor if you are taking blood thinner medicines.  · If you were given antibiotics, take them until they are gone. It is important to finish the antibiotics even if the wound looks better. This is to make sure the infection has cleared.  · Use antibiotic cream or ointment if your healthcare provider tells you to do so.  Prevention  Once this infection has healed, the following may decrease the risk of future infections:  · Keep the area of the cyst clean by bathing or showering daily.  · Avoid tight-fitting clothing to minimize perspiration and irritation of the skin.  · Recurrent pilonidal cysts may be completely removed by surgery. But this can only be done at a time when there is no infection. Ask your doctor for more information.  Follow-up care  Follow up with your healthcare provider, or as advised. If a gauze packing was inserted in your wound, it should be removed in 1 to 2 days. Check your wound every day for the signs listed below.  When to seek medical advice  Call your healthcare provider right away if any of these occur:  · Pus continues to come from the cyst for 5 days after the incision  · Increasing redness, local pain, or swelling  · Fever of 100.4°F (38.0°C) or higher for more than 2 days, or as directed by your healthcare provider  Date Last Reviewed: 12/1/2016  © 6143-9120 [employer]. [address] [place] [address],  [place] [address]. All rights reserved. This information is not intended as a substitute for professional medical care. Always follow your healthcare professional's instructions.        Pilonidal Cyst, Infected (Antibiotic Treatment)  A pilonidal cyst is a swelling that starts under the skin on the sacrum near the tailbone. It may look like a small dimple. It can fill with skin oils, hair, and dead skin cells. It may stay small or grow larger. It may become infected with normal skin bacteria because it often has an opening to the surface.  Causes  The cause of pilonidal cysts has been debated since they were first recognized. A cyst may be present at birth and go unnoticed. Injury, rubbing, or skin irritation may also cause pilonidal cysts. It can also be caused by an ingrown hair. The cause is most likely a combination of these things. Because some injury or irritation can lead to pilonidal cysts, they can be more common in people who sit or drive a lot for work.  Symptoms  A pilonidal cyst may be small and painless. If it becomes inflamed or infected, you may have these symptoms:  · Swelling  · Irritation or redness  · Pain  · Drainage  The cyst can swell and drain on its own. The swelling and drainage can come and go.  Treatment  A limited infection can be treated with antibiotics and home care. You have been given antibiotics to treat your infected pilonidal cyst.  Home care  The following guidelines will help you care for your wound at home:  · Sit in a tub filled with about 6 inches of hot water. Keep the water hot for 10 to 15 minutes.  · Don't squeeze the pilonidal cyst or stick a needle in it to drain it. This will make the infection worse, or spread it.  · Cover the cyst with a pad or something similar to keep it from becoming more irritated, damaged, and painful.  Medicines  · Take acetaminophen or ibuprofen for pain, unless you were given a different pain medicine to use. Talk with your doctor before  using these medicines if you have chronic liver or kidney disease, or have ever had a stomach ulcer or digestive bleeding. Also talk with your doctor if you are taking blood thinner medicines.  · Take the antibiotics that you were prescribed until they are all gone. To make sure the infection is cured, it is important to finish the antibiotics even if the wound looks better.  · Use antibiotic cream or ointment if your healthcare provider tells you to.    Preventing future infections  Once this infection has healed, follow these tips to lower the risk for another infection:  · Keep the area of the cyst clean by bathing or showering every day.  · Don't wear tight-fitting clothing. This will help lessen sweat and irritation of the skin.  · You may need surgery to completely remove the cyst if it keeps coming back. The surgery can only be done when the cyst is not infected. Ask your doctor for more information.  · Watch for signs of infection listed below so that treatment may be started early.  Follow-up care  Follow up with your healthcare provider, or as advised. Check your wound every day for the signs listed below.  When to seek medical advice  Call your healthcare provider right away if any of these occur:  · Pus coming from the cyst  · Increasing local pain, redness, or swelling  · Fever of 100.4°F (38.0°C) or higher for more than 2 days, or as directed by your healthcare provider  Date Last Reviewed: 12/1/2016  © 6744-1034 [employer] [address] [place] [address]. All rights reserved. This information is not intended as a substitute for professional medical care. Always follow your healthcare professional's instructions.        Wound Care After Packing Removal or Replacement  Packing is a special type of dressing placed inside a wound to help it heal. Once the packing is removed, you need to care for your wound. Good wound care helps prevent infection. Be sure to keep all follow-up  appointments with your healthcare provider. Follow these instructions to take care of the wound once youre at home.  Home care  Your healthcare provider may prescribe medicines for pain. Or he or she may suggest an over-the-counter (OTC) pain reliever, such as ibuprofen or acetaminophen. Talk with your healthcare provider before taking any OTC medicines if you have chronic liver or kidney disease, a stomach ulcer, or gastrointestinal bleeding. In certain cases, you may also need to take antibiotics to help prevent infection. If so, take them exactly as directed for as long as directed. Dont stop taking your antibiotics until they are all gone, even if you feel better.  Here are some general care guidelines for your wound:  · Follow your healthcare providers instructions on how to care for your wound. Always wash your hands with soap and warm water before and after tending to your wound.  · If a bandage was put on, remove and change it once a day or as directed. If the bandage gets wet or dirty, replace it as soon as possible with a new bandage. Use a clean cloth to gently pat the wound dry.  · If your packing was replaced, a small piece of gauze may hang from the wound. It allows fluid to continue draining from the wound. You may need to use an ointment or cream to keep the packing from sticking to the bandage.  · Don't bathe in a tub or soak your wound until your healthcare provider says its OK. Take showers or sponge baths instead. Avoid swimming.  · Dont scratch, rub, scrub, or pick your wound.  · Check your wound daily for the signs of infection listed below.  If your wound was closed, it was likely with one of four types of closures. These include stitches (sutures), strips of surgical tape, skin glue, and staples. Your healthcare provider will decide on the best closure based on the size and location of your wound. Each type of closure needs specific care.  · Sutures. You may want to clean the wound daily  after the first 2 to 3 days. To do this, remove the bandage and gently wash the area with soap and warm water. After cleaning, apply a thin layer of antibiotic ointment if recommended. Then put on a new bandage. Sutures on the outside of the skin usually need to be removed by your healthcare provider.  · Surgical tape. Keep the area dry. If it gets wet, blot it dry with a towel. Surgical tape closures usually fall off within 7 to 10 days. If they have not fallen off after 10 days, you can remove them yourself. To remove the tape, use mineral oil or petroleum jelly on a cotton ball to gently rub the adhesive.  · Skin glue. You may shower or bathe as usual. But do not use soaps, lotions, or ointments on the wound area. Do not scrub the wound. After bathing, pat the wound dry with a soft towel. Do not apply liquids like peroxide, ointments, or creams to the wound while the strips or film is in place. Don't scratch, rub, or pick at the strips or film. Don't put tape directly over the strips or film. Skin adhesive film will fall off naturally in 5 to 10 days. If it does not peel off in 10 days, gently rub petroleum jelly or an ointment onto the film.  · Staples. Take showers or sponge baths. Don't take tub baths. Don't use lotions on the wound area. The area may be cleaned with soap and water 2 to 3 days after the wound was stapled. Don't scrub the wound. Pat it dry with a clean soft cloth or towel. You can use antibiotic ointment if your healthcare provider tells you to. Staples will need to be removed in 10 to 14 days.  Follow-up care  Follow up with your healthcare provider, or as directed. If your packing was replaced, you may need another visit within 1 to 3 days to remove or replace it. If you have sutures or staples, return for their removal as directed.  When to seek medical advice  Call your health care provider right away if you have signs of infection:  · Fever of 1 degree or more above your normal  temperature, or as directed by your healthcare provider  · Increasing pain in the wound or pain that doesnt get better even with pain medicine  · Increasing redness or swelling  · Pus or bad-smelling drainage from the wound  Also call your healthcare provider right away if any of these occur:  · Your wound bleeds more than a small amount or wont stop bleeding.  · The edges of your wound come apart.  · You have numbness or weakness in the wound area that doesnt go away.  Date Last Reviewed: 10/2/2015  © 8022-3136 [employer]. [address] [place] [address]. All rights reserved. This information is not intended as a substitute for professional medical care. Always follow your healthcare professional's instructions.

## 2019-01-21 ENCOUNTER — OFFICE VISIT (OUTPATIENT)
Dept: URGENT CARE | Facility: CLINIC | Age: 44
End: 2019-01-21
Payer: COMMERCIAL

## 2019-01-21 VITALS
BODY MASS INDEX: 33.74 KG/M2 | WEIGHT: 215 LBS | OXYGEN SATURATION: 98 % | DIASTOLIC BLOOD PRESSURE: 92 MMHG | HEART RATE: 79 BPM | SYSTOLIC BLOOD PRESSURE: 121 MMHG | TEMPERATURE: 98 F | HEIGHT: 67 IN

## 2019-01-21 DIAGNOSIS — R09.81 SINUS CONGESTION: ICD-10-CM

## 2019-01-21 DIAGNOSIS — R05.9 COUGH: ICD-10-CM

## 2019-01-21 DIAGNOSIS — J06.9 UPPER RESPIRATORY TRACT INFECTION, UNSPECIFIED TYPE: Primary | ICD-10-CM

## 2019-01-21 DIAGNOSIS — M54.50 ACUTE BILATERAL LOW BACK PAIN WITHOUT SCIATICA: ICD-10-CM

## 2019-01-21 PROCEDURE — 99214 OFFICE O/P EST MOD 30 MIN: CPT | Mod: 25,S$GLB,, | Performed by: FAMILY MEDICINE

## 2019-01-21 PROCEDURE — 99214 PR OFFICE/OUTPT VISIT, EST, LEVL IV, 30-39 MIN: ICD-10-PCS | Mod: 25,S$GLB,, | Performed by: FAMILY MEDICINE

## 2019-01-21 PROCEDURE — 96372 PR INJECTION,THERAP/PROPH/DIAG2ST, IM OR SUBCUT: ICD-10-PCS | Mod: S$GLB,,, | Performed by: FAMILY MEDICINE

## 2019-01-21 PROCEDURE — 3008F PR BODY MASS INDEX (BMI) DOCUMENTED: ICD-10-PCS | Mod: CPTII,S$GLB,, | Performed by: FAMILY MEDICINE

## 2019-01-21 PROCEDURE — 3008F BODY MASS INDEX DOCD: CPT | Mod: CPTII,S$GLB,, | Performed by: FAMILY MEDICINE

## 2019-01-21 PROCEDURE — 96372 THER/PROPH/DIAG INJ SC/IM: CPT | Mod: S$GLB,,, | Performed by: FAMILY MEDICINE

## 2019-01-21 RX ORDER — BETAMETHASONE SODIUM PHOSPHATE AND BETAMETHASONE ACETATE 3; 3 MG/ML; MG/ML
9 INJECTION, SUSPENSION INTRA-ARTICULAR; INTRALESIONAL; INTRAMUSCULAR; SOFT TISSUE
Status: COMPLETED | OUTPATIENT
Start: 2019-01-21 | End: 2019-01-21

## 2019-01-21 RX ORDER — CYCLOBENZAPRINE HCL 10 MG
10 TABLET ORAL NIGHTLY
Qty: 30 TABLET | Refills: 0 | Status: SHIPPED | OUTPATIENT
Start: 2019-01-21 | End: 2019-02-20

## 2019-01-21 RX ORDER — TRAMADOL HYDROCHLORIDE 50 MG/1
50 TABLET ORAL EVERY 8 HOURS PRN
Qty: 20 TABLET | Refills: 0 | Status: SHIPPED | OUTPATIENT
Start: 2019-01-21 | End: 2019-01-28

## 2019-01-21 RX ORDER — AZELASTINE 1 MG/ML
1 SPRAY, METERED NASAL 2 TIMES DAILY
Qty: 30 ML | Refills: 0 | Status: SHIPPED | OUTPATIENT
Start: 2019-01-21 | End: 2019-05-02

## 2019-01-21 RX ADMIN — BETAMETHASONE SODIUM PHOSPHATE AND BETAMETHASONE ACETATE 9 MG: 3; 3 INJECTION, SUSPENSION INTRA-ARTICULAR; INTRALESIONAL; INTRAMUSCULAR; SOFT TISSUE at 10:01

## 2019-01-21 NOTE — PROGRESS NOTES
"Subjective:       Patient ID: Brooke Moss is a 44 y.o. female.    Vitals:  height is 5' 7" (1.702 m) and weight is 97.5 kg (215 lb). Her oral temperature is 97.8 °F (36.6 °C). Her blood pressure is 121/92 (abnormal) and her pulse is 79. Her oxygen saturation is 98%.     Chief Complaint: URI and Back Pain    Pt has Hx of a herniated disk in her lower back but hasn't seen chirop. In over a yr. She didn't need it. No trauma to her lower back just started hurting w/ her cough.      URI    This is a new problem. The current episode started in the past 7 days (4 days). The problem has been unchanged. There has been no fever. Associated symptoms include coughing, headaches, rhinorrhea, sinus pain and sneezing. Pertinent negatives include no congestion, ear pain, nausea, rash, sore throat, vomiting or wheezing. She has tried antihistamine and acetaminophen (flonase) for the symptoms. The treatment provided no relief.   Back Pain   This is a chronic problem. The current episode started in the past 7 days (2 days). The problem occurs constantly. The pain is present in the lumbar spine. The quality of the pain is described as cramping. The pain does not radiate. The pain is at a severity of 9/10. The pain is severe. The pain is the same all the time. The symptoms are aggravated by bending, coughing, lying down, position, sitting, standing, stress and twisting. Stiffness is present all day. Associated symptoms include headaches. Pertinent negatives include no fever. Risk factors include poor posture, lack of exercise and obesity. She has tried heat, ice and NSAIDs for the symptoms. The treatment provided no relief.       Constitution: Positive for chills and fatigue. Negative for sweating and fever.   HENT: Positive for postnasal drip, sinus pain and sinus pressure. Negative for ear pain, congestion, sore throat and voice change.    Neck: Negative for painful lymph nodes.   Eyes: Negative for eye redness.   Respiratory: " Positive for cough and sputum production. Negative for chest tightness, bloody sputum, COPD, shortness of breath, stridor, wheezing and asthma.    Gastrointestinal: Negative for nausea and vomiting.   Musculoskeletal: Positive for back pain. Negative for muscle ache.   Skin: Negative for rash and erythema.   Allergic/Immunologic: Positive for sneezing. Negative for seasonal allergies and asthma.   Neurological: Positive for headaches.   Hematologic/Lymphatic: Negative for swollen lymph nodes.       Objective:      Physical Exam   Constitutional: She is oriented to person, place, and time. She appears well-developed and well-nourished.   HENT:   Head: Normocephalic and atraumatic.   Right Ear: External ear normal.   Left Ear: External ear normal.   Mouth/Throat: Oropharynx is clear and moist.   Eyes: EOM are normal. Pupils are equal, round, and reactive to light.   Neck: Normal range of motion. Neck supple.   Cardiovascular: Normal rate, regular rhythm and normal heart sounds. Exam reveals no gallop and no friction rub.   No murmur heard.  Pulmonary/Chest: Breath sounds normal. No respiratory distress. She has no wheezes. She has no rales. She exhibits no tenderness.   Abdominal: Soft. Bowel sounds are normal. She exhibits no distension and no mass. There is no tenderness. There is no rebound and no guarding. No hernia.   Musculoskeletal:        Back:    Bilateral lower back muscles tenderness, no swelling, no bruise, no warmth.  Associated with mild/moderated tenderness in the lspine.  Limited lower back ROM due to pain.     Lymphadenopathy:     She has no cervical adenopathy.   Neurological: She is alert and oriented to person, place, and time. She displays normal reflexes. No cranial nerve deficit. She exhibits normal muscle tone. Coordination normal.   Skin: Skin is warm. Capillary refill takes less than 2 seconds. No rash noted. No erythema. No pallor.   Psychiatric: She has a normal mood and affect. Her  behavior is normal. Judgment and thought content normal.   Vitals reviewed.      Assessment:       1. Upper respiratory tract infection, unspecified type    2. Cough    3. Sinus congestion    4. Acute bilateral low back pain without sciatica        Plan:         Upper respiratory tract infection, unspecified type    Cough  -     betamethasone acetate-betamethasone sodium phosphate injection 9 mg    Sinus congestion  -     betamethasone acetate-betamethasone sodium phosphate injection 9 mg  -     azelastine (ASTELIN) 137 mcg (0.1 %) nasal spray; 1 spray (137 mcg total) by Nasal route 2 (two) times daily.  Dispense: 30 mL; Refill: 0    Acute bilateral low back pain without sciatica  -     betamethasone acetate-betamethasone sodium phosphate injection 9 mg  -     cyclobenzaprine (FLEXERIL) 10 MG tablet; Take 1 tablet (10 mg total) by mouth every evening.  Dispense: 30 tablet; Refill: 0  -     traMADol (ULTRAM) 50 mg tablet; Take 1 tablet (50 mg total) by mouth every 8 (eight) hours as needed for Pain.  Dispense: 20 tablet; Refill: 0          Patient Instructions       Preventing Common Respiratory Infections  Respiratory infections such as colds and influenza (the flu) are common in winter. These infections are often caused by viruses. They may share some symptoms, but not all respiratory infections are the same. Some make you more sick than others. You can take steps to prevent common respiratory infections. And if you get sick, you can take care of yourself to keep the infection from getting worse.    What is a cold?  · Symptoms include runny nose, coughing and sneezing, and sore throat. Cold symptoms tend to be milder than flu symptoms.  · Symptoms tend to come on slowly. They last for a few days to about a week.  · With a cold, you can still do most of the things you usually do.  What is the flu?  · Symptoms include fever, headache, fatigue, cough, sore throat, runny nose, and muscle aches. Children may have  upset stomach and vomiting, but adults usually dont.  · Symptoms tend to come on quickly. Some, such as fatigue and cough, can last a few weeks.  · With the flu, you may feel worn out and not able to do normal activities.  · Its most likely NOT the flu if an adult has vomiting or diarrhea for a day or two. This so-called stomach flu is probably a GI (gastrointestinal) infection.  When the infection gets worse  Without proper care, a respiratory infection can get worse. It can lead to serious complications and death. If you arent getting better, call your healthcare provider. Complications can include:  · Bronchitis (infection of the airways that leads to shortness of breath and coughing up thick yellow or green mucus)  · Pneumonia (infection of the lungs in which fluid and mucus settle in the lungs, making breathing difficult)  · Worsening of chronic conditions such as heart failure, chronic lung disease, asthma, or diabetes  · Severe dehydration (loss of fluids)  · Sinus problems  · Ear infections   Get a flu vaccine  A flu vaccine protects you from influenza (but not other colds or infections). Get a vaccine each fall, before flu season starts. This can be done at a clinic, healthcare providers office, drugstore, [facility] center, or through your workplace.  Get pneumococcal vaccines  Pneumonia can be a complication of influenza. There are 2 pneumococcal pneumonia vaccines that protect against many types of pneumonia. Talk with your healthcare provider about these important vaccines.   Keep germs from spreading  No one likes getting sick. To protect yourself and others from cold and flu germs:  · Wash your hands often. Use alcohol-based hand  when you dont have access to soap and water.  · Dont touch your eyes, nose, and mouth. This may help you keep germs out of your body.  · Try to avoid people with respiratory infections. You may want to stay out of crowds during flu season (winter).  · Ask your  healthcare provider if you should get a pneumonia vaccination.  How to wash your hands  · Use warm water and plenty of soap. Work up a good lather.  · Clean your whole hand, under your nails, between your fingers, and up your wrists. Wash for at least 15 to 20 seconds. Dont just wipe--rub well.  · Rinse. Let the water run down your fingertips, not up your wrists.  · In a public restroom, use a paper towel to turn off the faucet and open the door.   Date Last Reviewed: 12/1/2016  © 6835-4765 [employer]. [address] [place] [address], [place] [address]. All rights reserved. This information is not intended as a substitute for professional medical care. Always follow your healthcare professional's instructions.      Self-Care for Low Back Pain    Most people have low back pain now and then. In many cases, it isnt serious and self-care can help. Sometimes low back pain can be a sign of a bigger problem. Call your healthcare provider if your pain returns often or gets worse over time. For the long-term care of your back, get regular exercise, lose any excess weight and learn good posture.  Take a short rest  Lying down during the day may be beneficial for short periods of time if severe pain increases with sitting or standing. Long-term bed rest could be detrimental.  Reduce pain and swelling  Cold reduces swelling. Both cold and heat can reduce pain. Protect your skin by placing a towel between your body and the ice or heat source.  · For the first few days, apply an ice pack for 15 to 20 minutes .  · After the first few days, try heat for 15 minutes at a time to ease pain. Never sleep on a heating pad.  · Over-the-counter medicine can help control pain and swelling. Try aspirin or ibuprofen.  Exercise  Exercise can help your back heal. It also helps your back get stronger and more flexible, preventing any reinjury. Ask your healthcare provider about specific exercises for your back.  Use good posture to avoid  reinjury  · When moving, bend at the hips and knees. Dont bend at the waist or twist around.  · When lifting, keep the object close to your body. Dont try to lift more than you can handle.  · When sitting, keep your lower back supported. Use a rolled-up towel as needed.  Seek immediate medical care if:  · Youre unable to stand or walk.  · You have a temperature over 100.4°F (38.0°C)  · You have frequent, painful, or bloody urination.  · You have severe abdominal pain.  · You have a sharp, stabbing pain.  · Your pain is constant.  · You have pain or numbness in your leg.  · You feel pain in a new area of your back.  · You notice that the pain isnt decreasing after more than a week.   Date Last Reviewed: 9/29/2015  © 8088-1224 [employer]. [address] [place] [address], [place] [address]. All rights reserved. This information is not intended as a substitute for professional medical care. Always follow your healthcare professional's instructions.    Follow up with your doctor in a few days as needed.  Return to the urgent care or go to the ER if symptoms get worse.    Christian Turpin MD

## 2019-01-21 NOTE — LETTER
January 21, 2019      [facility] [place]  [address]  [place] [address]  Phone: [phone]  Fax: [phone]       Patient: Brooke Moss   YOB: 1975  Date of Visit: 01/21/2019    To Whom It May Concern:    Mary Moss  was at [facility] on 01/21/2019. She may return to work/school on 1/24/19 with no restrictions. If you have any questions or concerns, or if I can be of further assistance, please do not hesitate to contact me.    Sincerely,    Christian Turpin MD

## 2019-01-21 NOTE — PATIENT INSTRUCTIONS
Preventing Common Respiratory Infections  Respiratory infections such as colds and influenza (the flu) are common in winter. These infections are often caused by viruses. They may share some symptoms, but not all respiratory infections are the same. Some make you more sick than others. You can take steps to prevent common respiratory infections. And if you get sick, you can take care of yourself to keep the infection from getting worse.    What is a cold?  · Symptoms include runny nose, coughing and sneezing, and sore throat. Cold symptoms tend to be milder than flu symptoms.  · Symptoms tend to come on slowly. They last for a few days to about a week.  · With a cold, you can still do most of the things you usually do.  What is the flu?  · Symptoms include fever, headache, fatigue, cough, sore throat, runny nose, and muscle aches. Children may have upset stomach and vomiting, but adults usually dont.  · Symptoms tend to come on quickly. Some, such as fatigue and cough, can last a few weeks.  · With the flu, you may feel worn out and not able to do normal activities.  · Its most likely NOT the flu if an adult has vomiting or diarrhea for a day or two. This so-called stomach flu is probably a GI (gastrointestinal) infection.  When the infection gets worse  Without proper care, a respiratory infection can get worse. It can lead to serious complications and death. If you arent getting better, call your healthcare provider. Complications can include:  · Bronchitis (infection of the airways that leads to shortness of breath and coughing up thick yellow or green mucus)  · Pneumonia (infection of the lungs in which fluid and mucus settle in the lungs, making breathing difficult)  · Worsening of chronic conditions such as heart failure, chronic lung disease, asthma, or diabetes  · Severe dehydration (loss of fluids)  · Sinus problems  · Ear infections   Get a flu vaccine  A flu vaccine protects you from influenza  (but not other colds or infections). Get a vaccine each fall, before flu season starts. This can be done at a clinic, healthcare providers office, drugstore, senior center, or through your workplace.  Get pneumococcal vaccines  Pneumonia can be a complication of influenza. There are 2 pneumococcal pneumonia vaccines that protect against many types of pneumonia. Talk with your healthcare provider about these important vaccines.   Keep germs from spreading  No one likes getting sick. To protect yourself and others from cold and flu germs:  · Wash your hands often. Use alcohol-based hand  when you dont have access to soap and water.  · Dont touch your eyes, nose, and mouth. This may help you keep germs out of your body.  · Try to avoid people with respiratory infections. You may want to stay out of crowds during flu season (winter).  · Ask your healthcare provider if you should get a pneumonia vaccination.  How to wash your hands  · Use warm water and plenty of soap. Work up a good lather.  · Clean your whole hand, under your nails, between your fingers, and up your wrists. Wash for at least 15 to 20 seconds. Dont just wipe--rub well.  · Rinse. Let the water run down your fingertips, not up your wrists.  · In a public restroom, use a paper towel to turn off the faucet and open the door.   Date Last Reviewed: 12/1/2016  © 7327-5726 [employer]. [address] [place] [address], [place] [address]. All rights reserved. This information is not intended as a substitute for professional medical care. Always follow your healthcare professional's instructions.      Self-Care for Low Back Pain    Most people have low back pain now and then. In many cases, it isnt serious and self-care can help. Sometimes low back pain can be a sign of a bigger problem. Call your healthcare provider if your pain returns often or gets worse over time. For the long-term care of your back, get regular exercise, lose any excess  weight and learn good posture.  Take a short rest  Lying down during the day may be beneficial for short periods of time if severe pain increases with sitting or standing. Long-term bed rest could be detrimental.  Reduce pain and swelling  Cold reduces swelling. Both cold and heat can reduce pain. Protect your skin by placing a towel between your body and the ice or heat source.  · For the first few days, apply an ice pack for 15 to 20 minutes .  · After the first few days, try heat for 15 minutes at a time to ease pain. Never sleep on a heating pad.  · Over-the-counter medicine can help control pain and swelling. Try aspirin or ibuprofen.  Exercise  Exercise can help your back heal. It also helps your back get stronger and more flexible, preventing any reinjury. Ask your healthcare provider about specific exercises for your back.  Use good posture to avoid reinjury  · When moving, bend at the hips and knees. Dont bend at the waist or twist around.  · When lifting, keep the object close to your body. Dont try to lift more than you can handle.  · When sitting, keep your lower back supported. Use a rolled-up towel as needed.  Seek immediate medical care if:  · Youre unable to stand or walk.  · You have a temperature over 100.4°F (38.0°C)  · You have frequent, painful, or bloody urination.  · You have severe abdominal pain.  · You have a sharp, stabbing pain.  · Your pain is constant.  · You have pain or numbness in your leg.  · You feel pain in a new area of your back.  · You notice that the pain isnt decreasing after more than a week.   Date Last Reviewed: 9/29/2015 © 2000-2017 [employer]. [address] [place] [address]. All rights reserved. This information is not intended as a substitute for professional medical care. Always follow your healthcare professional's instructions.    Follow up with your doctor in a few days as needed.  Return to the urgent care or go to the ER if symptoms  get worse.    Christian Turpin MD

## 2019-05-02 ENCOUNTER — OFFICE VISIT (OUTPATIENT)
Dept: URGENT CARE | Facility: CLINIC | Age: 44
End: 2019-05-02
Payer: COMMERCIAL

## 2019-05-02 VITALS
SYSTOLIC BLOOD PRESSURE: 123 MMHG | OXYGEN SATURATION: 96 % | BODY MASS INDEX: 33.74 KG/M2 | HEART RATE: 97 BPM | RESPIRATION RATE: 16 BRPM | DIASTOLIC BLOOD PRESSURE: 73 MMHG | TEMPERATURE: 98 F | HEIGHT: 67 IN | WEIGHT: 215 LBS

## 2019-05-02 DIAGNOSIS — Z20.818 STREPTOCOCCUS EXPOSURE: ICD-10-CM

## 2019-05-02 DIAGNOSIS — R06.2 WHEEZING ON EXPIRATION: ICD-10-CM

## 2019-05-02 DIAGNOSIS — R09.82 POST-NASAL DRIP: ICD-10-CM

## 2019-05-02 DIAGNOSIS — R05.9 COUGH IN ADULT: ICD-10-CM

## 2019-05-02 DIAGNOSIS — B96.89 BACTERIAL SINUSITIS: Primary | ICD-10-CM

## 2019-05-02 DIAGNOSIS — J32.9 BACTERIAL SINUSITIS: Primary | ICD-10-CM

## 2019-05-02 LAB
CTP QC/QA: YES
S PYO RRNA THROAT QL PROBE: NEGATIVE

## 2019-05-02 PROCEDURE — 99214 OFFICE O/P EST MOD 30 MIN: CPT | Mod: S$GLB,,, | Performed by: NURSE PRACTITIONER

## 2019-05-02 PROCEDURE — 87880 POCT RAPID STREP A: ICD-10-PCS | Mod: QW,S$GLB,, | Performed by: NURSE PRACTITIONER

## 2019-05-02 PROCEDURE — 3008F PR BODY MASS INDEX (BMI) DOCUMENTED: ICD-10-PCS | Mod: CPTII,S$GLB,, | Performed by: NURSE PRACTITIONER

## 2019-05-02 PROCEDURE — 87880 STREP A ASSAY W/OPTIC: CPT | Mod: QW,S$GLB,, | Performed by: NURSE PRACTITIONER

## 2019-05-02 PROCEDURE — 3008F BODY MASS INDEX DOCD: CPT | Mod: CPTII,S$GLB,, | Performed by: NURSE PRACTITIONER

## 2019-05-02 PROCEDURE — 99214 PR OFFICE/OUTPT VISIT, EST, LEVL IV, 30-39 MIN: ICD-10-PCS | Mod: S$GLB,,, | Performed by: NURSE PRACTITIONER

## 2019-05-02 RX ORDER — CEFDINIR 300 MG/1
300 CAPSULE ORAL 2 TIMES DAILY
Qty: 10 CAPSULE | Refills: 0 | Status: SHIPPED | OUTPATIENT
Start: 2019-05-02 | End: 2019-05-07

## 2019-05-02 RX ORDER — PREDNISONE 20 MG/1
40 TABLET ORAL DAILY
Qty: 10 TABLET | Refills: 0 | Status: SHIPPED | OUTPATIENT
Start: 2019-05-02 | End: 2019-05-07

## 2019-05-02 RX ORDER — AZELASTINE 1 MG/ML
2 SPRAY, METERED NASAL 2 TIMES DAILY
Qty: 30 ML | Refills: 0 | Status: ON HOLD | OUTPATIENT
Start: 2019-05-02 | End: 2023-07-07 | Stop reason: HOSPADM

## 2019-05-02 RX ORDER — GUAIFENESIN 600 MG/1
600 TABLET, EXTENDED RELEASE ORAL 2 TIMES DAILY
Qty: 30 TABLET | Refills: 0 | Status: SHIPPED | OUTPATIENT
Start: 2019-05-02 | End: 2019-05-17

## 2019-05-02 RX ORDER — PROMETHAZINE HYDROCHLORIDE AND CODEINE PHOSPHATE 6.25; 1 MG/5ML; MG/5ML
5 SOLUTION ORAL EVERY 6 HOURS PRN
Qty: 240 ML | Refills: 0 | Status: SHIPPED | OUTPATIENT
Start: 2019-05-02 | End: 2019-05-09

## 2019-05-02 NOTE — PROGRESS NOTES
"Subjective:       Patient ID: Brooke Moss is a 44 y.o. female.    Vitals:  height is 5' 7" (1.702 m) and weight is 97.5 kg (215 lb). Her oral temperature is 98.1 °F (36.7 °C). Her blood pressure is 123/73 and her pulse is 97. Her respiration is 16 and oxygen saturation is 96%.     Chief Complaint: Sinus Problem    Sore throat began last Thursday.  Pt was exposed to strep at work.    Sinus Problem   This is a new problem. The current episode started in the past 7 days. The problem has been gradually worsening since onset. There has been no fever. Associated symptoms include congestion, coughing, headaches, sinus pressure, a sore throat and swollen glands. Pertinent negatives include no chills, diaphoresis, ear pain, hoarse voice, neck pain, shortness of breath or sneezing. Past treatments include spray decongestants and acetaminophen. The treatment provided mild relief.       Constitution: Positive for fatigue. Negative for chills, sweating and fever.   HENT: Positive for congestion, postnasal drip, sinus pain, sinus pressure and sore throat. Negative for ear pain and voice change.    Neck: Negative for neck pain and painful lymph nodes.   Eyes: Negative for eye redness.   Respiratory: Positive for cough and sputum production. Negative for chest tightness, bloody sputum, COPD, shortness of breath, stridor, wheezing and asthma.    Gastrointestinal: Negative for nausea and vomiting.   Musculoskeletal: Positive for muscle ache.   Skin: Negative for rash.   Allergic/Immunologic: Positive for seasonal allergies. Negative for asthma and sneezing.   Neurological: Positive for headaches.   Hematologic/Lymphatic: Negative for swollen lymph nodes.       Objective:      Physical Exam   Constitutional: She is oriented to person, place, and time. Vital signs are normal. She appears well-developed and well-nourished. She is cooperative.  Non-toxic appearance. She does not have a sickly appearance. She appears ill. No " distress.   HENT:   Head: Normocephalic and atraumatic.   Right Ear: Hearing, external ear and ear canal normal. A middle ear effusion is present.   Left Ear: Hearing, external ear and ear canal normal. A middle ear effusion is present.   Nose: Mucosal edema and rhinorrhea present. No nasal deformity. No epistaxis. Right sinus exhibits maxillary sinus tenderness and frontal sinus tenderness. Left sinus exhibits maxillary sinus tenderness and frontal sinus tenderness.   Mouth/Throat: Uvula is midline and mucous membranes are normal. No trismus in the jaw. Normal dentition. No uvula swelling. Posterior oropharyngeal erythema present.   Eyes: Pupils are equal, round, and reactive to light. Conjunctivae and lids are normal. No scleral icterus.   Sclera clear bilat   Neck: Trachea normal, normal range of motion, full passive range of motion without pain and phonation normal. Neck supple. No spinous process tenderness and no muscular tenderness present. No neck rigidity. Normal range of motion present.   Cardiovascular: Normal rate, regular rhythm, normal heart sounds and normal pulses.   Pulmonary/Chest: Effort normal. No respiratory distress. She has wheezes in the right lower field and the left lower field.   Abdominal: Soft. Normal appearance and bowel sounds are normal. She exhibits no distension. There is no tenderness.   Musculoskeletal: Normal range of motion. She exhibits no edema or deformity.   Lymphadenopathy:     She has no cervical adenopathy.   Neurological: She is alert and oriented to person, place, and time. She exhibits normal muscle tone. Coordination normal.   Skin: Skin is warm, dry and intact. Capillary refill takes less than 2 seconds. She is not diaphoretic. No pallor.   Psychiatric: She has a normal mood and affect. Her speech is normal and behavior is normal. Judgment and thought content normal. Cognition and memory are normal.   Nursing note and vitals reviewed.      Results for orders placed  or performed in visit on 05/02/19   POCT rapid strep A   Result Value Ref Range    Rapid Strep A Screen Negative Negative     Acceptable Yes      Assessment:       1. Bacterial sinusitis    2. Streptococcus exposure    3. Post-nasal drip    4. Wheezing on expiration    5. Cough in adult        Plan:         Bacterial sinusitis  -     cefdinir (OMNICEF) 300 MG capsule; Take 1 capsule (300 mg total) by mouth 2 (two) times daily. for 5 days  Dispense: 10 capsule; Refill: 0  -     predniSONE (DELTASONE) 20 MG tablet; Take 2 tablets (40 mg total) by mouth once daily. for 5 days  Dispense: 10 tablet; Refill: 0  -     guaiFENesin (MUCINEX) 600 mg 12 hr tablet; Take 1 tablet (600 mg total) by mouth 2 (two) times daily. for 15 days  Dispense: 30 tablet; Refill: 0  -     promethazine-codeine 6.25-10 mg/5 ml (PHENERGAN WITH CODEINE) 6.25-10 mg/5 mL syrup; Take 5 mLs by mouth every 6 (six) hours as needed for Cough.  Dispense: 240 mL; Refill: 0  -     azelastine (ASTELIN) 137 mcg (0.1 %) nasal spray; 2 sprays (274 mcg total) by Nasal route 2 (two) times daily.  Dispense: 30 mL; Refill: 0    Streptococcus exposure  -     POCT rapid strep A    Post-nasal drip  -     predniSONE (DELTASONE) 20 MG tablet; Take 2 tablets (40 mg total) by mouth once daily. for 5 days  Dispense: 10 tablet; Refill: 0  -     guaiFENesin (MUCINEX) 600 mg 12 hr tablet; Take 1 tablet (600 mg total) by mouth 2 (two) times daily. for 15 days  Dispense: 30 tablet; Refill: 0  -     azelastine (ASTELIN) 137 mcg (0.1 %) nasal spray; 2 sprays (274 mcg total) by Nasal route 2 (two) times daily.  Dispense: 30 mL; Refill: 0    Wheezing on expiration  -     predniSONE (DELTASONE) 20 MG tablet; Take 2 tablets (40 mg total) by mouth once daily. for 5 days  Dispense: 10 tablet; Refill: 0  -     guaiFENesin (MUCINEX) 600 mg 12 hr tablet; Take 1 tablet (600 mg total) by mouth 2 (two) times daily. for 15 days  Dispense: 30 tablet; Refill: 0    Cough in  adult  -     predniSONE (DELTASONE) 20 MG tablet; Take 2 tablets (40 mg total) by mouth once daily. for 5 days  Dispense: 10 tablet; Refill: 0  -     guaiFENesin (MUCINEX) 600 mg 12 hr tablet; Take 1 tablet (600 mg total) by mouth 2 (two) times daily. for 15 days  Dispense: 30 tablet; Refill: 0  -     promethazine-codeine 6.25-10 mg/5 ml (PHENERGAN WITH CODEINE) 6.25-10 mg/5 mL syrup; Take 5 mLs by mouth every 6 (six) hours as needed for Cough.  Dispense: 240 mL; Refill: 0      Patient Instructions     Please drink plenty of fluids.  Please get plenty of rest.  Please return here or go to the Emergency Department for any concerns or worsening of condition.  If you were given wait & see antibiotics, please wait 3-5 days before taking them, and only take them if your symptoms have worsened or not improved.  If you do begin taking the antibiotics, please take them to completion.  If you were prescribed antibiotics, please take them to completion.  If you were prescribed a narcotic medication, do not drive or operate heavy equipment or machinery while taking these medications.  If you do not have Hypertension or any history of palpitations, it is ok to take over the counter Sudafed or Mucinex D or Allegra-D or Claritin-D or Zyrtec-D.  If you do take one of the above, it is ok to combine that with plain over the counter Mucinex or Allegra or Claritin or Zyrtec.  If for example you are taking Zyrtec -D, you can combine that with Mucinex, but not Mucinex-D.  If you are taking Mucinex-D, you can combine that with plain Allegra or Claritin or Zyrtec.   If you do have Hypertension or palpitations, it is safe to take Coricidin HBP for relief of sinus symptoms.  We recommend you take over the counter Flonase (Fluticasone) or another nasally inhaled steroid unless you are already taking one.  Nasal irrigation with a saline spray or Netti Pot like device per their directions is also recommended.  If not allergic, please take over  the counter Tylenol (Acetaminophen) and/or Motrin (Ibuprofen) as directed for control of pain and/or fever.  Please follow up with your primary care doctor or specialist as needed.    If you  smoke, please stop smoking.    Sinusitis (Antibiotic Treatment)    The sinuses are air-filled spaces within the bones of the face. They connect to the inside of the nose. Sinusitis is an inflammation of the tissue lining the sinus cavity. Sinus inflammation can occur during a cold. It can also be due to allergies to pollens and other particles in the air. Sinusitis can cause symptoms of sinus congestion and fullness. A sinus infection causes fever, headache and facial pain. There is often green or yellow drainage from the nose or into the back of the throat (post-nasal drip). You have been given antibiotics to treat this condition.  Home care:  · Take the full course of antibiotics as instructed. Do not stop taking them, even if you feel better.  · Drink plenty of water, hot tea, and other liquids. This may help thin mucus. It also may promote sinus drainage.  · Heat may help soothe painful areas of the face. Use a towel soaked in hot water. Or,  the shower and direct the hot spray onto your face. Using a vaporizer along with a menthol rub at night may also help.   · An expectorant containing guaifenesin may help thin the mucus and promote drainage from the sinuses.  · Over-the-counter decongestants may be used unless a similar medicine was prescribed. Nasal sprays work the fastest. Use one that contains phenylephrine or oxymetazoline. First blow the nose gently. Then use the spray. Do not use these medicines more often than directed on the label or symptoms may get worse. You may also use tablets containing pseudoephedrine. Avoid products that combine ingredients, because side effects may be increased. Read labels. You can also ask the pharmacist for help. (NOTE: Persons with high blood pressure should not use  decongestants. They can raise blood pressure.)  · Over-the-counter antihistamines may help if allergies contributed to your sinusitis.    · Do not use nasal rinses or irrigation during an acute sinus infection, unless told to by your health care provider. Rinsing may spread the infection to other sinuses.  · Use acetaminophen or ibuprofen to control pain, unless another pain medicine was prescribed. (If you have chronic liver or kidney disease or ever had a stomach ulcer, talk with your doctor before using these medicines. Aspirin should never be used in anyone under 18 years of age who is ill with a fever. It may cause severe liver damage.)  · Don't smoke. This can worsen symptoms.  Follow-up care  Follow up with your healthcare provider or our staff if you are not improving within the next week.  When to seek medical advice  Call your healthcare provider if any of these occur:  · Facial pain or headache becoming more severe  · Stiff neck  · Unusual drowsiness or confusion  · Swelling of the forehead or eyelids  · Vision problems, including blurred or double vision  · Fever of 100.4ºF (38ºC) or higher, or as directed by your healthcare provider  · Seizure  · Breathing problems  · Symptoms not resolving within 10 days  Date Last Reviewed: 4/13/2015  © 2532-2545 [employer]. [address] [place] [address], [place] [address]. All rights reserved. This information is not intended as a substitute for professional medical care. Always follow your healthcare professional's instructions.

## 2019-05-02 NOTE — LETTER
May 2, 2019      [facility] [address] [place] [address]  Phone: [phone]  Fax: [phone]       Patient: Brooke Moss   YOB: 1975  Date of Visit: 05/02/2019    To Whom It May Concern:    Mary Moss  was at [facility] on 05/02/2019. He may return to work/school on 05/14/19 with no restrictions. If you have any questions or concerns, or if I can be of further assistance, please do not hesitate to contact me.    Sincerely,    Maya Lopez, NP

## 2019-05-02 NOTE — PATIENT INSTRUCTIONS
Please drink plenty of fluids.  Please get plenty of rest.  Please return here or go to the Emergency Department for any concerns or worsening of condition.  If you were given wait & see antibiotics, please wait 3-5 days before taking them, and only take them if your symptoms have worsened or not improved.  If you do begin taking the antibiotics, please take them to completion.  If you were prescribed antibiotics, please take them to completion.  If you were prescribed a narcotic medication, do not drive or operate heavy equipment or machinery while taking these medications.  If you do not have Hypertension or any history of palpitations, it is ok to take over the counter Sudafed or Mucinex D or Allegra-D or Claritin-D or Zyrtec-D.  If you do take one of the above, it is ok to combine that with plain over the counter Mucinex or Allegra or Claritin or Zyrtec.  If for example you are taking Zyrtec -D, you can combine that with Mucinex, but not Mucinex-D.  If you are taking Mucinex-D, you can combine that with plain Allegra or Claritin or Zyrtec.   If you do have Hypertension or palpitations, it is safe to take Coricidin HBP for relief of sinus symptoms.  We recommend you take over the counter Flonase (Fluticasone) or another nasally inhaled steroid unless you are already taking one.  Nasal irrigation with a saline spray or Netti Pot like device per their directions is also recommended.  If not allergic, please take over the counter Tylenol (Acetaminophen) and/or Motrin (Ibuprofen) as directed for control of pain and/or fever.  Please follow up with your primary care doctor or specialist as needed.    If you  smoke, please stop smoking.    Sinusitis (Antibiotic Treatment)    The sinuses are air-filled spaces within the bones of the face. They connect to the inside of the nose. Sinusitis is an inflammation of the tissue lining the sinus cavity. Sinus inflammation can occur during a cold. It can also be due to  allergies to pollens and other particles in the air. Sinusitis can cause symptoms of sinus congestion and fullness. A sinus infection causes fever, headache and facial pain. There is often green or yellow drainage from the nose or into the back of the throat (post-nasal drip). You have been given antibiotics to treat this condition.  Home care:  · Take the full course of antibiotics as instructed. Do not stop taking them, even if you feel better.  · Drink plenty of water, hot tea, and other liquids. This may help thin mucus. It also may promote sinus drainage.  · Heat may help soothe painful areas of the face. Use a towel soaked in hot water. Or,  the shower and direct the hot spray onto your face. Using a vaporizer along with a menthol rub at night may also help.   · An expectorant containing guaifenesin may help thin the mucus and promote drainage from the sinuses.  · Over-the-counter decongestants may be used unless a similar medicine was prescribed. Nasal sprays work the fastest. Use one that contains phenylephrine or oxymetazoline. First blow the nose gently. Then use the spray. Do not use these medicines more often than directed on the label or symptoms may get worse. You may also use tablets containing pseudoephedrine. Avoid products that combine ingredients, because side effects may be increased. Read labels. You can also ask the pharmacist for help. (NOTE: Persons with high blood pressure should not use decongestants. They can raise blood pressure.)  · Over-the-counter antihistamines may help if allergies contributed to your sinusitis.    · Do not use nasal rinses or irrigation during an acute sinus infection, unless told to by your health care provider. Rinsing may spread the infection to other sinuses.  · Use acetaminophen or ibuprofen to control pain, unless another pain medicine was prescribed. (If you have chronic liver or kidney disease or ever had a stomach ulcer, talk with your doctor before  using these medicines. Aspirin should never be used in anyone under 18 years of age who is ill with a fever. It may cause severe liver damage.)  · Don't smoke. This can worsen symptoms.  Follow-up care  Follow up with your healthcare provider or our staff if you are not improving within the next week.  When to seek medical advice  Call your healthcare provider if any of these occur:  · Facial pain or headache becoming more severe  · Stiff neck  · Unusual drowsiness or confusion  · Swelling of the forehead or eyelids  · Vision problems, including blurred or double vision  · Fever of 100.4ºF (38ºC) or higher, or as directed by your healthcare provider  · Seizure  · Breathing problems  · Symptoms not resolving within 10 days  Date Last Reviewed: 4/13/2015  © 7795-0607 [employer]. [address] [place] [address], [place] [address]. All rights reserved. This information is not intended as a substitute for professional medical care. Always follow your healthcare professional's instructions.

## 2019-11-08 ENCOUNTER — OFFICE VISIT (OUTPATIENT)
Dept: URGENT CARE | Facility: CLINIC | Age: 44
End: 2019-11-08
Payer: COMMERCIAL

## 2019-11-08 VITALS
DIASTOLIC BLOOD PRESSURE: 83 MMHG | HEART RATE: 76 BPM | OXYGEN SATURATION: 98 % | SYSTOLIC BLOOD PRESSURE: 129 MMHG | WEIGHT: 240 LBS | HEIGHT: 67 IN | BODY MASS INDEX: 37.67 KG/M2 | RESPIRATION RATE: 16 BRPM | TEMPERATURE: 97 F

## 2019-11-08 DIAGNOSIS — B34.9 ACUTE VIRAL SYNDROME: ICD-10-CM

## 2019-11-08 DIAGNOSIS — J30.9 ALLERGIC RHINITIS, UNSPECIFIED SEASONALITY, UNSPECIFIED TRIGGER: Primary | ICD-10-CM

## 2019-11-08 DIAGNOSIS — R68.83 CHILLS: ICD-10-CM

## 2019-11-08 LAB
CTP QC/QA: YES
FLUAV AG NPH QL: NEGATIVE
FLUBV AG NPH QL: NEGATIVE

## 2019-11-08 PROCEDURE — 99214 PR OFFICE/OUTPT VISIT, EST, LEVL IV, 30-39 MIN: ICD-10-PCS | Mod: S$GLB,,, | Performed by: NURSE PRACTITIONER

## 2019-11-08 PROCEDURE — 87804 POCT INFLUENZA A/B: ICD-10-PCS | Mod: 59,QW,S$GLB, | Performed by: NURSE PRACTITIONER

## 2019-11-08 PROCEDURE — 87804 INFLUENZA ASSAY W/OPTIC: CPT | Mod: QW,S$GLB,, | Performed by: NURSE PRACTITIONER

## 2019-11-08 PROCEDURE — 3008F BODY MASS INDEX DOCD: CPT | Mod: CPTII,S$GLB,, | Performed by: NURSE PRACTITIONER

## 2019-11-08 PROCEDURE — 99214 OFFICE O/P EST MOD 30 MIN: CPT | Mod: S$GLB,,, | Performed by: NURSE PRACTITIONER

## 2019-11-08 PROCEDURE — 3008F PR BODY MASS INDEX (BMI) DOCUMENTED: ICD-10-PCS | Mod: CPTII,S$GLB,, | Performed by: NURSE PRACTITIONER

## 2019-11-08 RX ORDER — AZELASTINE 1 MG/ML
1 SPRAY, METERED NASAL 2 TIMES DAILY
Qty: 30 ML | Refills: 0 | Status: SHIPPED | OUTPATIENT
Start: 2019-11-08 | End: 2019-12-08

## 2019-11-08 NOTE — PATIENT INSTRUCTIONS
"CONTINUE WITH NASAL SPRAYS AND YOU CAN START A DECONGESTANT SUCH AS CLARITIN D TO HELP WITH SYMPTOMS    YOUR RESULTS WERE NEGATIVE    You must understand that you've received an Urgent Care treatment only and that you may be released before all your medical problems are known or treated. You, the patient, will arrange for follow up care as instructed.  If your condition worsens we recommend that you receive another evaluation at the emergency room immediately or contact your primary medical clinics after hours call service to discuss your concerns.  Please return here or go to the Emergency Department for any concerns or worsening of condition.      Viral Syndrome (Adult)  A viral illness may cause a number of symptoms. The symptoms depend on the part of the body that the virus affects. If it settles in your nose, throat, and lungs, it may cause cough, sore throat, congestion, and sometimes headache. If it settles in your stomach and intestinal tract, it may cause vomiting and diarrhea. Sometimes it causes vague symptoms like "aching all over," feeling tired, loss of appetite, or fever.  A viral illness usually lasts 1 to 2 weeks, but sometimes it lasts longer. In some cases, a more serious infection can look like a viral syndrome in the first few days of the illness. You may need another exam and additional tests to know the difference. Watch for the warning signs listed below.  Home care  Follow these guidelines for taking care of yourself at home:  · If symptoms are severe, rest at home for the first 2 to 3 days.  · Stay away from cigarette smoke - both your smoke and the smoke from others.  · You may use over-the-counter acetaminophen or ibuprofen for fever, muscle aching, and headache, unless another medicine was prescribed for this. If you have chronic liver or kidney disease or ever had a stomach ulcer or GI bleeding, talk with your doctor before using these medicines. No one who is younger than 18 and ill " with a fever should take aspirin. It may cause severe disease or death.  · Your appetite may be poor, so a light diet is fine. Avoid dehydration by drinking 8 to 12 8-ounce glasses of fluids each day. This may include water; orange juice; lemonade; apple, grape, and cranberry juice; clear fruit drinks; electrolyte replacement and sports drinks; and decaffeinated teas and coffee. If you have been diagnosed with a kidney disease, ask your doctor how much and what types of fluids you should drink to prevent dehydration. If you have kidney disease, drinking too much fluid can cause it build up in the your body and be dangerous to your health.  · Over-the-counter remedies won't shorten the length of the illness but may be helpful for cough, sore throat; and nasal and sinus congestion. Don't use decongestants if you have high blood pressure.  Follow-up care  Follow up with your healthcare provider if you do not improve over the next week.  Call 911  Get emergency medical care if any of the following occur:  · Convulsion  · Feeling weak, dizzy, or like you are going to faint  · Chest pain, shortness of breath, wheezing, or difficulty breathing  When to seek medical advice  Call your healthcare provider right away if any of these occur:  · Cough with lots of colored sputum (mucus) or blood in your sputum  · Chest pain, shortness of breath, wheezing, or difficulty breathing  · Severe headache; face, neck, or ear pain  · Severe, constant pain in the lower right side of your belly (abdominal)  · Continued vomiting (cant keep liquids down)  · Frequent diarrhea (more than 5 times a day); blood (red or black color) or mucus in diarrhea  · Feeling weak, dizzy, or like you are going to faint  · Extreme thirst  · Fever of 100.4°F (38°C) or higher, or as directed by your healthcare provider  Date Last Reviewed: 9/25/2015  © 4584-4756 [employer]. [address] [place] [address], [place] [address]. All rights reserved. This  information is not intended as a substitute for professional medical care. Always follow your healthcare professional's instructions.

## 2019-11-08 NOTE — PROGRESS NOTES
"Subjective:       Patient ID: Brooke Moss is a 44 y.o. female.    Vitals:  height is 5' 7" (1.702 m) and weight is 108.9 kg (240 lb). Her temperature is 97 °F (36.1 °C). Her blood pressure is 129/83 and her pulse is 76. Her respiration is 16 and oxygen saturation is 98%.     Chief Complaint: URI    Started a few days ago  Patient would like to rule out flu today  No known exposure  No fever    URI    This is a new problem. The current episode started in the past 7 days. The problem has been gradually worsening. Maximum temperature: 99.0. Associated symptoms include congestion, headaches, nausea and a sore throat. Pertinent negatives include no coughing, ear pain, rash, sinus pain, vomiting or wheezing. Treatments tried: nasal spray, sinus meds. The treatment provided no relief.       Constitution: Positive for chills. Negative for sweating, fatigue and fever.   HENT: Positive for congestion and sore throat. Negative for ear pain, sinus pain, sinus pressure and voice change.    Neck: Negative for painful lymph nodes.   Eyes: Negative for eye redness.   Respiratory: Negative for chest tightness, cough, sputum production, bloody sputum, COPD, shortness of breath, stridor, wheezing and asthma.    Gastrointestinal: Positive for nausea. Negative for vomiting.   Musculoskeletal: Positive for muscle ache.   Skin: Negative for rash.   Allergic/Immunologic: Negative for seasonal allergies and asthma.   Neurological: Positive for headaches.   Hematologic/Lymphatic: Negative for swollen lymph nodes.       Objective:      Physical Exam   Constitutional: She is oriented to person, place, and time. She appears well-developed and well-nourished. She is cooperative.  Non-toxic appearance. She does not have a sickly appearance. She does not appear ill. No distress.   HENT:   Head: Normocephalic and atraumatic.   Right Ear: Hearing, tympanic membrane, external ear and ear canal normal.   Left Ear: Hearing, tympanic membrane, " external ear and ear canal normal.   Nose: Mucosal edema and rhinorrhea present. No nasal deformity. No epistaxis. Right sinus exhibits maxillary sinus tenderness. Right sinus exhibits no frontal sinus tenderness. Left sinus exhibits maxillary sinus tenderness. Left sinus exhibits no frontal sinus tenderness.   Mouth/Throat: Uvula is midline, oropharynx is clear and moist and mucous membranes are normal. No trismus in the jaw. Normal dentition. No uvula swelling. No oropharyngeal exudate, posterior oropharyngeal edema or posterior oropharyngeal erythema.   Eyes: Conjunctivae and lids are normal. No scleral icterus.   Neck: Trachea normal, full passive range of motion without pain and phonation normal. Neck supple. No neck rigidity. No edema and no erythema present.   Cardiovascular: Normal rate, regular rhythm, normal heart sounds, intact distal pulses and normal pulses.   Pulmonary/Chest: Effort normal and breath sounds normal. No respiratory distress. She has no decreased breath sounds. She has no rhonchi.   Abdominal: Normal appearance.   Musculoskeletal: Normal range of motion. She exhibits no edema or deformity.   Neurological: She is alert and oriented to person, place, and time. She exhibits normal muscle tone. Coordination normal.   Skin: Skin is warm, dry, intact, not diaphoretic and not pale.   Psychiatric: She has a normal mood and affect. Her speech is normal and behavior is normal. Judgment and thought content normal. Cognition and memory are normal.   Nursing note and vitals reviewed.        Results for orders placed or performed in visit on 11/08/19   POCT Influenza A/B   Result Value Ref Range    Rapid Influenza A Ag Negative Negative    Rapid Influenza B Ag Negative Negative     Acceptable Yes        Assessment:       1. Allergic rhinitis, unspecified seasonality, unspecified trigger    2. Acute viral syndrome    3. Chills        Plan:         Allergic rhinitis, unspecified  "seasonality, unspecified trigger  -     azelastine (ASTELIN) 137 mcg (0.1 %) nasal spray; 1 spray (137 mcg total) by Nasal route 2 (two) times daily.  Dispense: 30 mL; Refill: 0    Acute viral syndrome    Chills  -     POCT Influenza A/B            Patient Instructions   CONTINUE WITH NASAL SPRAYS AND YOU CAN START A DECONGESTANT SUCH AS CLARITIN D TO HELP WITH SYMPTOMS    YOUR RESULTS WERE NEGATIVE    You must understand that you've received an Urgent Care treatment only and that you may be released before all your medical problems are known or treated. You, the patient, will arrange for follow up care as instructed.  If your condition worsens we recommend that you receive another evaluation at the emergency room immediately or contact your primary medical clinics after hours call service to discuss your concerns.  Please return here or go to the Emergency Department for any concerns or worsening of condition.      Viral Syndrome (Adult)  A viral illness may cause a number of symptoms. The symptoms depend on the part of the body that the virus affects. If it settles in your nose, throat, and lungs, it may cause cough, sore throat, congestion, and sometimes headache. If it settles in your stomach and intestinal tract, it may cause vomiting and diarrhea. Sometimes it causes vague symptoms like "aching all over," feeling tired, loss of appetite, or fever.  A viral illness usually lasts 1 to 2 weeks, but sometimes it lasts longer. In some cases, a more serious infection can look like a viral syndrome in the first few days of the illness. You may need another exam and additional tests to know the difference. Watch for the warning signs listed below.  Home care  Follow these guidelines for taking care of yourself at home:  · If symptoms are severe, rest at home for the first 2 to 3 days.  · Stay away from cigarette smoke - both your smoke and the smoke from others.  · You may use over-the-counter acetaminophen or " ibuprofen for fever, muscle aching, and headache, unless another medicine was prescribed for this. If you have chronic liver or kidney disease or ever had a stomach ulcer or GI bleeding, talk with your doctor before using these medicines. No one who is younger than 18 and ill with a fever should take aspirin. It may cause severe disease or death.  · Your appetite may be poor, so a light diet is fine. Avoid dehydration by drinking 8 to 12 8-ounce glasses of fluids each day. This may include water; orange juice; lemonade; apple, grape, and cranberry juice; clear fruit drinks; electrolyte replacement and sports drinks; and decaffeinated teas and coffee. If you have been diagnosed with a kidney disease, ask your doctor how much and what types of fluids you should drink to prevent dehydration. If you have kidney disease, drinking too much fluid can cause it build up in the your body and be dangerous to your health.  · Over-the-counter remedies won't shorten the length of the illness but may be helpful for cough, sore throat; and nasal and sinus congestion. Don't use decongestants if you have high blood pressure.  Follow-up care  Follow up with your healthcare provider if you do not improve over the next week.  Call 911  Get emergency medical care if any of the following occur:  · Convulsion  · Feeling weak, dizzy, or like you are going to faint  · Chest pain, shortness of breath, wheezing, or difficulty breathing  When to seek medical advice  Call your healthcare provider right away if any of these occur:  · Cough with lots of colored sputum (mucus) or blood in your sputum  · Chest pain, shortness of breath, wheezing, or difficulty breathing  · Severe headache; face, neck, or ear pain  · Severe, constant pain in the lower right side of your belly (abdominal)  · Continued vomiting (cant keep liquids down)  · Frequent diarrhea (more than 5 times a day); blood (red or black color) or mucus in diarrhea  · Feeling weak,  dizzy, or like you are going to faint  · Extreme thirst  · Fever of 100.4°F (38°C) or higher, or as directed by your healthcare provider  Date Last Reviewed: 9/25/2015  © 3615-0094 [employer]. [address] [place] [address], [place] [address]. All rights reserved. This information is not intended as a substitute for professional medical care. Always follow your healthcare professional's instructions.

## 2020-12-28 ENCOUNTER — CLINICAL SUPPORT (OUTPATIENT)
Dept: URGENT CARE | Facility: CLINIC | Age: 45
End: 2020-12-28
Payer: COMMERCIAL

## 2020-12-28 VITALS — HEART RATE: 73 BPM | OXYGEN SATURATION: 98 %

## 2020-12-28 DIAGNOSIS — Z11.59 SCREENING FOR VIRAL DISEASE: Primary | ICD-10-CM

## 2020-12-28 LAB
CTP QC/QA: YES
SARS-COV-2 RDRP RESP QL NAA+PROBE: NEGATIVE

## 2020-12-28 PROCEDURE — U0002 COVID-19 LAB TEST NON-CDC: HCPCS | Mod: QW,S$GLB,, | Performed by: NURSE PRACTITIONER

## 2020-12-28 PROCEDURE — 99211 OFF/OP EST MAY X REQ PHY/QHP: CPT | Mod: S$GLB,,, | Performed by: NURSE PRACTITIONER

## 2020-12-28 PROCEDURE — 99211 PR OFFICE/OUTPT VISIT, EST, LEVL I: ICD-10-PCS | Mod: S$GLB,,, | Performed by: NURSE PRACTITIONER

## 2020-12-28 PROCEDURE — U0002: ICD-10-PCS | Mod: QW,S$GLB,, | Performed by: NURSE PRACTITIONER

## 2020-12-28 NOTE — PROGRESS NOTES
PATIENT EDUCATION GIVEN.    Patient presents to the clinic with COVID concerns, patient was given the option to see a provider.  Patient is symptomatic and elects to not see a provider, they were given a handout which recognizes their decision to not see the provider today, as well as recommendations to follow up with their PCP.  If their symptoms worsen, they will need to follow up with their PCP, any urgent care clinic, or ER for further evaluation.     Patient's temperature, O2 sats, and pulse were taken for this visit.   Vitals:    12/28/20 0817   Pulse: 73         They were within normal limits.   If not with in normal, they were advised that they should see the provider for evaluation.  However, they adamantly refused despite provider's encouragement.

## 2021-02-19 ENCOUNTER — OFFICE VISIT (OUTPATIENT)
Dept: URGENT CARE | Facility: CLINIC | Age: 46
End: 2021-02-19
Payer: COMMERCIAL

## 2021-02-19 VITALS
WEIGHT: 260 LBS | HEART RATE: 98 BPM | DIASTOLIC BLOOD PRESSURE: 76 MMHG | OXYGEN SATURATION: 99 % | TEMPERATURE: 98 F | RESPIRATION RATE: 17 BRPM | BODY MASS INDEX: 40.81 KG/M2 | SYSTOLIC BLOOD PRESSURE: 122 MMHG | HEIGHT: 67 IN

## 2021-02-19 DIAGNOSIS — L05.01 PILONIDAL ABSCESS: Primary | ICD-10-CM

## 2021-02-19 PROCEDURE — 99214 OFFICE O/P EST MOD 30 MIN: CPT | Mod: S$GLB,,, | Performed by: PHYSICIAN ASSISTANT

## 2021-02-19 PROCEDURE — 3008F PR BODY MASS INDEX (BMI) DOCUMENTED: ICD-10-PCS | Mod: CPTII,S$GLB,, | Performed by: PHYSICIAN ASSISTANT

## 2021-02-19 PROCEDURE — 99214 PR OFFICE/OUTPT VISIT, EST, LEVL IV, 30-39 MIN: ICD-10-PCS | Mod: S$GLB,,, | Performed by: PHYSICIAN ASSISTANT

## 2021-02-19 PROCEDURE — 3008F BODY MASS INDEX DOCD: CPT | Mod: CPTII,S$GLB,, | Performed by: PHYSICIAN ASSISTANT

## 2021-02-19 RX ORDER — MUPIROCIN 20 MG/G
OINTMENT TOPICAL
Qty: 22 G | Refills: 0 | Status: ON HOLD | OUTPATIENT
Start: 2021-02-19 | End: 2023-01-19 | Stop reason: HOSPADM

## 2021-02-19 RX ORDER — SULFAMETHOXAZOLE AND TRIMETHOPRIM 800; 160 MG/1; MG/1
1 TABLET ORAL 2 TIMES DAILY
Qty: 14 TABLET | Refills: 0 | Status: SHIPPED | OUTPATIENT
Start: 2021-02-19 | End: 2021-02-26

## 2021-04-16 ENCOUNTER — PATIENT MESSAGE (OUTPATIENT)
Dept: RESEARCH | Facility: HOSPITAL | Age: 46
End: 2021-04-16

## 2021-07-22 ENCOUNTER — OFFICE VISIT (OUTPATIENT)
Dept: URGENT CARE | Facility: CLINIC | Age: 46
End: 2021-07-22
Payer: COMMERCIAL

## 2021-07-22 VITALS
WEIGHT: 220 LBS | TEMPERATURE: 98 F | OXYGEN SATURATION: 96 % | BODY MASS INDEX: 34.53 KG/M2 | SYSTOLIC BLOOD PRESSURE: 146 MMHG | HEART RATE: 100 BPM | HEIGHT: 67 IN | DIASTOLIC BLOOD PRESSURE: 85 MMHG

## 2021-07-22 DIAGNOSIS — R05.9 COUGH: ICD-10-CM

## 2021-07-22 DIAGNOSIS — Z20.822 CLOSE EXPOSURE TO COVID-19 VIRUS: Primary | ICD-10-CM

## 2021-07-22 LAB
CTP QC/QA: YES
SARS-COV-2 RDRP RESP QL NAA+PROBE: NEGATIVE

## 2021-07-22 PROCEDURE — U0002: ICD-10-PCS | Mod: QW,S$GLB,, | Performed by: INTERNAL MEDICINE

## 2021-07-22 PROCEDURE — U0002 COVID-19 LAB TEST NON-CDC: HCPCS | Mod: QW,S$GLB,, | Performed by: INTERNAL MEDICINE

## 2021-07-22 PROCEDURE — 3008F BODY MASS INDEX DOCD: CPT | Mod: CPTII,S$GLB,, | Performed by: INTERNAL MEDICINE

## 2021-07-22 PROCEDURE — 99214 OFFICE O/P EST MOD 30 MIN: CPT | Mod: S$GLB,,, | Performed by: INTERNAL MEDICINE

## 2021-07-22 PROCEDURE — 3008F PR BODY MASS INDEX (BMI) DOCUMENTED: ICD-10-PCS | Mod: CPTII,S$GLB,, | Performed by: INTERNAL MEDICINE

## 2021-07-22 PROCEDURE — 99214 PR OFFICE/OUTPT VISIT, EST, LEVL IV, 30-39 MIN: ICD-10-PCS | Mod: S$GLB,,, | Performed by: INTERNAL MEDICINE

## 2022-08-07 ENCOUNTER — OFFICE VISIT (OUTPATIENT)
Dept: URGENT CARE | Facility: CLINIC | Age: 47
End: 2022-08-07
Payer: COMMERCIAL

## 2022-08-07 VITALS
HEIGHT: 67 IN | TEMPERATURE: 99 F | OXYGEN SATURATION: 95 % | RESPIRATION RATE: 18 BRPM | BODY MASS INDEX: 42.38 KG/M2 | WEIGHT: 270 LBS | SYSTOLIC BLOOD PRESSURE: 110 MMHG | HEART RATE: 78 BPM | DIASTOLIC BLOOD PRESSURE: 77 MMHG

## 2022-08-07 DIAGNOSIS — M54.9 BACK PAIN, UNSPECIFIED BACK LOCATION, UNSPECIFIED BACK PAIN LATERALITY, UNSPECIFIED CHRONICITY: Primary | ICD-10-CM

## 2022-08-07 PROCEDURE — 3078F DIAST BP <80 MM HG: CPT | Mod: CPTII,S$GLB,, | Performed by: NURSE PRACTITIONER

## 2022-08-07 PROCEDURE — 96372 PR INJECTION,THERAP/PROPH/DIAG2ST, IM OR SUBCUT: ICD-10-PCS | Mod: S$GLB,,, | Performed by: NURSE PRACTITIONER

## 2022-08-07 PROCEDURE — 3078F PR MOST RECENT DIASTOLIC BLOOD PRESSURE < 80 MM HG: ICD-10-PCS | Mod: CPTII,S$GLB,, | Performed by: NURSE PRACTITIONER

## 2022-08-07 PROCEDURE — 99213 OFFICE O/P EST LOW 20 MIN: CPT | Mod: 25,S$GLB,, | Performed by: NURSE PRACTITIONER

## 2022-08-07 PROCEDURE — 96372 THER/PROPH/DIAG INJ SC/IM: CPT | Mod: S$GLB,,, | Performed by: NURSE PRACTITIONER

## 2022-08-07 PROCEDURE — 1159F MED LIST DOCD IN RCRD: CPT | Mod: CPTII,S$GLB,, | Performed by: NURSE PRACTITIONER

## 2022-08-07 PROCEDURE — 99213 PR OFFICE/OUTPT VISIT, EST, LEVL III, 20-29 MIN: ICD-10-PCS | Mod: 25,S$GLB,, | Performed by: NURSE PRACTITIONER

## 2022-08-07 PROCEDURE — 3008F PR BODY MASS INDEX (BMI) DOCUMENTED: ICD-10-PCS | Mod: CPTII,S$GLB,, | Performed by: NURSE PRACTITIONER

## 2022-08-07 PROCEDURE — 1160F RVW MEDS BY RX/DR IN RCRD: CPT | Mod: CPTII,S$GLB,, | Performed by: NURSE PRACTITIONER

## 2022-08-07 PROCEDURE — 1160F PR REVIEW ALL MEDS BY PRESCRIBER/CLIN PHARMACIST DOCUMENTED: ICD-10-PCS | Mod: CPTII,S$GLB,, | Performed by: NURSE PRACTITIONER

## 2022-08-07 PROCEDURE — 3074F PR MOST RECENT SYSTOLIC BLOOD PRESSURE < 130 MM HG: ICD-10-PCS | Mod: CPTII,S$GLB,, | Performed by: NURSE PRACTITIONER

## 2022-08-07 PROCEDURE — 1159F PR MEDICATION LIST DOCUMENTED IN MEDICAL RECORD: ICD-10-PCS | Mod: CPTII,S$GLB,, | Performed by: NURSE PRACTITIONER

## 2022-08-07 PROCEDURE — 3074F SYST BP LT 130 MM HG: CPT | Mod: CPTII,S$GLB,, | Performed by: NURSE PRACTITIONER

## 2022-08-07 PROCEDURE — 3008F BODY MASS INDEX DOCD: CPT | Mod: CPTII,S$GLB,, | Performed by: NURSE PRACTITIONER

## 2022-08-07 RX ORDER — METHOCARBAMOL 500 MG/1
500 TABLET, FILM COATED ORAL NIGHTLY PRN
Qty: 15 TABLET | Refills: 0 | Status: ON HOLD | OUTPATIENT
Start: 2022-08-07 | End: 2023-07-07 | Stop reason: HOSPADM

## 2022-08-07 RX ORDER — KETOROLAC TROMETHAMINE 30 MG/ML
30 INJECTION, SOLUTION INTRAMUSCULAR; INTRAVENOUS
Status: COMPLETED | OUTPATIENT
Start: 2022-08-07 | End: 2022-08-07

## 2022-08-07 RX ADMIN — KETOROLAC TROMETHAMINE 30 MG: 30 INJECTION, SOLUTION INTRAMUSCULAR; INTRAVENOUS at 02:08

## 2022-08-07 NOTE — PROGRESS NOTES
"Subjective:       Patient ID: Brooke Moss is a 47 y.o. female.    Vitals:  height is 5' 7" (1.702 m) and weight is 122.5 kg (270 lb). Her temperature is 98.7 °F (37.1 °C). Her blood pressure is 110/77 and her pulse is 78. Her respiration is 18 and oxygen saturation is 95%.     Chief Complaint: Back Pain    Pt states she started having pain since Friday in the right glutes and traveling to the thigh and down to the knee. Pt states it feels like a pinched nerve. Pt has a herniated disk on the left side and sees a chiropractor for that. Pt is not sure what cause the pain but pt states pain is constant and progressing. Pt get some relief with her ergonomic bed and certain positions. Pt took advil ibuprofen for relief last dosage of 4 advil (800) mg at 10 :30 am and only gotten mild relief.      Back Pain  This is a new problem. The current episode started in the past 7 days. The problem occurs constantly. The problem has been gradually worsening since onset. The pain is present in the gluteal. The quality of the pain is described as stabbing. The pain radiates to the right knee and right thigh. The pain is at a severity of 9/10. The pain is severe. The pain is the same all the time. The symptoms are aggravated by position, lying down, sitting, twisting and coughing. Stiffness is present all day. Associated symptoms include leg pain and weakness. She has tried bed rest, heat, ice and chiropractic manipulation (ibuprofen and advil) for the symptoms. The treatment provided mild relief.       Musculoskeletal: Positive for back pain.       Objective:      Physical Exam   HENT:   Head: Normocephalic and atraumatic.   Ears:   Right Ear: External ear normal.   Left Ear: External ear normal.   Pulmonary/Chest: Effort normal and breath sounds normal.   Abdominal: Normal appearance.   Musculoskeletal:      Thoracic back: Normal.        Back:       Comments: Denies numbness, weakness, tingling of lower extremities  No bowel " or bladder dysfunction.    Neurological: She is alert.   Nursing note and vitals reviewed.        Assessment:       1. Back pain, unspecified back location, unspecified back pain laterality, unspecified chronicity          Plan:       Pt with hx of herniated disk   No concern for cauda equina syndrome, compression fracture. Pt with no recent injury or trauma. No neurological deficits such as bowel or bladder dysfunction. Pt does have hx of herniated disk.   Back pain, unspecified back location, unspecified back pain laterality, unspecified chronicity  -     ketorolac injection 30 mg  -     methocarbamoL (ROBAXIN) 500 MG Tab; Take 1 tablet (500 mg total) by mouth nightly as needed (back pain).  Dispense: 15 tablet; Refill: 0                  Patient Instructions                                Orthopedic Follow up Discharge Instructions    If your condition worsens or fails to improve we recommend that you receive another evaluation at the ER immediately or contact your PCP to discuss your concerns or return here. You must understand that you've received an urgent care treatment only and that you may be released before all your medical problems are known or treated. You the patient will arrange for follouw care as instructed.   If you were prescribed a narcotic or muscle relaxant do not drive or operate heavy machinery while taking these medications   Tylenol or ibuprofen can also be used as directed for pain unless you have an allergy to them or medical condition such as stomach ulcers, kidney or liver disease or blood thinners etc for which you should not be taking these type of medications.   If you were given a prescription NSAID here do not also take any over the counter NSAID like ibuprofen, aleve, advil, motrin etc   RICE which means rest, ice compression and elevation are helpful.   If you have Low Back Pain and develop bowel or bladder symptoms or increase pain going down your legs go to the ER immediately.    If you were given a splint wear it at all times.   If you were given crutches use them as we instructed. Do not rest your armpits on the foam pad or you risk compressing the nerves and the vessels there   Follow up with the orthopedist in 1 week if you continue with pain.   Sometimes it can take up to 1 week for stress fractures to show up on an X-ray, and may need reimaging or a CT or MRI of the affected area.

## 2023-01-14 ENCOUNTER — HOSPITAL ENCOUNTER (INPATIENT)
Facility: HOSPITAL | Age: 48
LOS: 5 days | Discharge: HOME | DRG: 418 | End: 2023-01-19
Attending: EMERGENCY MEDICINE | Admitting: STUDENT IN AN ORGANIZED HEALTH CARE EDUCATION/TRAINING PROGRAM
Payer: COMMERCIAL

## 2023-01-14 DIAGNOSIS — K83.1 BILIARY OBSTRUCTION: ICD-10-CM

## 2023-01-14 DIAGNOSIS — R07.9 CHEST PAIN: ICD-10-CM

## 2023-01-14 DIAGNOSIS — K80.21 CALCULUS OF GALLBLADDER WITH BILIARY OBSTRUCTION BUT WITHOUT CHOLECYSTITIS: ICD-10-CM

## 2023-01-14 DIAGNOSIS — K80.01 CALCULUS OF GALLBLADDER WITH ACUTE CHOLECYSTITIS AND OBSTRUCTION: Primary | ICD-10-CM

## 2023-01-14 DIAGNOSIS — R10.11 RIGHT UPPER QUADRANT ABDOMINAL PAIN: ICD-10-CM

## 2023-01-14 DIAGNOSIS — K80.51 CALCULUS OF BILE DUCT WITHOUT CHOLECYSTITIS WITH OBSTRUCTION: ICD-10-CM

## 2023-01-14 DIAGNOSIS — R10.11 RUQ ABDOMINAL PAIN: ICD-10-CM

## 2023-01-14 LAB
ALBUMIN SERPL BCP-MCNC: 3.3 G/DL (ref 3.5–5.2)
ALP SERPL-CCNC: 160 U/L (ref 55–135)
ALT SERPL W/O P-5'-P-CCNC: 1212 U/L (ref 10–44)
ANION GAP SERPL CALC-SCNC: 12 MMOL/L (ref 8–16)
APAP SERPL-MCNC: <3 UG/ML (ref 10–20)
APTT BLDCRRT: 25.8 SEC (ref 21–32)
AST SERPL-CCNC: 895 U/L (ref 10–40)
B-HCG UR QL: NEGATIVE
BACTERIA #/AREA URNS AUTO: ABNORMAL /HPF
BASOPHILS # BLD AUTO: 0.06 K/UL (ref 0–0.2)
BASOPHILS NFR BLD: 0.8 % (ref 0–1.9)
BILIRUB SERPL-MCNC: 6.1 MG/DL (ref 0.1–1)
BILIRUB UR QL STRIP: ABNORMAL
BUN SERPL-MCNC: 8 MG/DL (ref 6–20)
CALCIUM SERPL-MCNC: 9.6 MG/DL (ref 8.7–10.5)
CHLORIDE SERPL-SCNC: 102 MMOL/L (ref 95–110)
CLARITY UR REFRACT.AUTO: CLEAR
CO2 SERPL-SCNC: 20 MMOL/L (ref 23–29)
COLOR UR AUTO: ABNORMAL
CREAT SERPL-MCNC: 0.6 MG/DL (ref 0.5–1.4)
CTP QC/QA: YES
DIFFERENTIAL METHOD: ABNORMAL
EOSINOPHIL # BLD AUTO: 0.2 K/UL (ref 0–0.5)
EOSINOPHIL NFR BLD: 2.1 % (ref 0–8)
ERYTHROCYTE [DISTWIDTH] IN BLOOD BY AUTOMATED COUNT: 13.8 % (ref 11.5–14.5)
EST. GFR  (NO RACE VARIABLE): >60 ML/MIN/1.73 M^2
GLUCOSE SERPL-MCNC: 104 MG/DL (ref 70–110)
GLUCOSE UR QL STRIP: NEGATIVE
HAV IGM SERPL QL IA: NORMAL
HBV CORE IGM SERPL QL IA: NORMAL
HBV SURFACE AG SERPL QL IA: NORMAL
HCT VFR BLD AUTO: 43.1 % (ref 37–48.5)
HCV AB SERPL QL IA: NORMAL
HGB BLD-MCNC: 14.3 G/DL (ref 12–16)
HGB UR QL STRIP: ABNORMAL
HYALINE CASTS UR QL AUTO: 0 /LPF
IMM GRANULOCYTES # BLD AUTO: 0.01 K/UL (ref 0–0.04)
IMM GRANULOCYTES NFR BLD AUTO: 0.1 % (ref 0–0.5)
INR PPP: 1.1 (ref 0.8–1.2)
KETONES UR QL STRIP: ABNORMAL
LEUKOCYTE ESTERASE UR QL STRIP: NEGATIVE
LIPASE SERPL-CCNC: 15 U/L (ref 4–60)
LYMPHOCYTES # BLD AUTO: 0.9 K/UL (ref 1–4.8)
LYMPHOCYTES NFR BLD: 12.9 % (ref 18–48)
MCH RBC QN AUTO: 29.1 PG (ref 27–31)
MCHC RBC AUTO-ENTMCNC: 33.2 G/DL (ref 32–36)
MCV RBC AUTO: 88 FL (ref 82–98)
MICROSCOPIC COMMENT: ABNORMAL
MONOCYTES # BLD AUTO: 0.5 K/UL (ref 0.3–1)
MONOCYTES NFR BLD: 7.2 % (ref 4–15)
NEUTROPHILS # BLD AUTO: 5.5 K/UL (ref 1.8–7.7)
NEUTROPHILS NFR BLD: 76.9 % (ref 38–73)
NITRITE UR QL STRIP: NEGATIVE
NRBC BLD-RTO: 0 /100 WBC
PH UR STRIP: 5 [PH] (ref 5–8)
PLATELET # BLD AUTO: 266 K/UL (ref 150–450)
PMV BLD AUTO: 10.2 FL (ref 9.2–12.9)
POTASSIUM SERPL-SCNC: 3.6 MMOL/L (ref 3.5–5.1)
PROT SERPL-MCNC: 7.7 G/DL (ref 6–8.4)
PROT UR QL STRIP: ABNORMAL
PROTHROMBIN TIME: 11.4 SEC (ref 9–12.5)
RBC # BLD AUTO: 4.92 M/UL (ref 4–5.4)
RBC #/AREA URNS AUTO: 21 /HPF (ref 0–4)
SODIUM SERPL-SCNC: 134 MMOL/L (ref 136–145)
SP GR UR STRIP: 1.03 (ref 1–1.03)
SQUAMOUS #/AREA URNS AUTO: 5 /HPF
URN SPEC COLLECT METH UR: ABNORMAL
WBC # BLD AUTO: 7.21 K/UL (ref 3.9–12.7)
WBC #/AREA URNS AUTO: 4 /HPF (ref 0–5)

## 2023-01-14 PROCEDURE — 63600175 PHARM REV CODE 636 W HCPCS: Performed by: STUDENT IN AN ORGANIZED HEALTH CARE EDUCATION/TRAINING PROGRAM

## 2023-01-14 PROCEDURE — 96375 TX/PRO/DX INJ NEW DRUG ADDON: CPT

## 2023-01-14 PROCEDURE — 96361 HYDRATE IV INFUSION ADD-ON: CPT

## 2023-01-14 PROCEDURE — 85730 THROMBOPLASTIN TIME PARTIAL: CPT | Performed by: EMERGENCY MEDICINE

## 2023-01-14 PROCEDURE — 80143 DRUG ASSAY ACETAMINOPHEN: CPT | Performed by: EMERGENCY MEDICINE

## 2023-01-14 PROCEDURE — 80053 COMPREHEN METABOLIC PANEL: CPT | Performed by: PHYSICIAN ASSISTANT

## 2023-01-14 PROCEDURE — 85610 PROTHROMBIN TIME: CPT | Performed by: EMERGENCY MEDICINE

## 2023-01-14 PROCEDURE — 81001 URINALYSIS AUTO W/SCOPE: CPT | Performed by: PHYSICIAN ASSISTANT

## 2023-01-14 PROCEDURE — 85025 COMPLETE CBC W/AUTO DIFF WBC: CPT | Performed by: PHYSICIAN ASSISTANT

## 2023-01-14 PROCEDURE — 63600175 PHARM REV CODE 636 W HCPCS: Performed by: PHYSICIAN ASSISTANT

## 2023-01-14 PROCEDURE — 25000003 PHARM REV CODE 250: Performed by: PHYSICIAN ASSISTANT

## 2023-01-14 PROCEDURE — 99285 EMERGENCY DEPT VISIT HI MDM: CPT | Mod: ,,, | Performed by: PHYSICIAN ASSISTANT

## 2023-01-14 PROCEDURE — 96374 THER/PROPH/DIAG INJ IV PUSH: CPT

## 2023-01-14 PROCEDURE — 81025 URINE PREGNANCY TEST: CPT | Performed by: PHYSICIAN ASSISTANT

## 2023-01-14 PROCEDURE — 20600001 HC STEP DOWN PRIVATE ROOM

## 2023-01-14 PROCEDURE — 99222 PR INITIAL HOSPITAL CARE,LEVL II: ICD-10-PCS | Mod: ,,, | Performed by: STUDENT IN AN ORGANIZED HEALTH CARE EDUCATION/TRAINING PROGRAM

## 2023-01-14 PROCEDURE — 83690 ASSAY OF LIPASE: CPT | Performed by: PHYSICIAN ASSISTANT

## 2023-01-14 PROCEDURE — 80074 ACUTE HEPATITIS PANEL: CPT | Performed by: PHYSICIAN ASSISTANT

## 2023-01-14 PROCEDURE — 99222 1ST HOSP IP/OBS MODERATE 55: CPT | Mod: ,,, | Performed by: STUDENT IN AN ORGANIZED HEALTH CARE EDUCATION/TRAINING PROGRAM

## 2023-01-14 PROCEDURE — 99285 EMERGENCY DEPT VISIT HI MDM: CPT | Mod: 25

## 2023-01-14 PROCEDURE — 99285 PR EMERGENCY DEPT VISIT,LEVEL V: ICD-10-PCS | Mod: ,,, | Performed by: PHYSICIAN ASSISTANT

## 2023-01-14 RX ORDER — GLUCAGON 1 MG
1 KIT INJECTION
Status: DISCONTINUED | OUTPATIENT
Start: 2023-01-14 | End: 2023-01-19 | Stop reason: HOSPADM

## 2023-01-14 RX ORDER — SODIUM CHLORIDE, SODIUM LACTATE, POTASSIUM CHLORIDE, CALCIUM CHLORIDE 600; 310; 30; 20 MG/100ML; MG/100ML; MG/100ML; MG/100ML
INJECTION, SOLUTION INTRAVENOUS CONTINUOUS
Status: DISCONTINUED | OUTPATIENT
Start: 2023-01-14 | End: 2023-01-18

## 2023-01-14 RX ORDER — ONDANSETRON 2 MG/ML
4 INJECTION INTRAMUSCULAR; INTRAVENOUS
Status: COMPLETED | OUTPATIENT
Start: 2023-01-14 | End: 2023-01-14

## 2023-01-14 RX ORDER — MORPHINE SULFATE 4 MG/ML
4 INJECTION, SOLUTION INTRAMUSCULAR; INTRAVENOUS EVERY 6 HOURS PRN
Status: DISCONTINUED | OUTPATIENT
Start: 2023-01-14 | End: 2023-01-19 | Stop reason: HOSPADM

## 2023-01-14 RX ORDER — NALOXONE HCL 0.4 MG/ML
0.02 VIAL (ML) INJECTION
Status: DISCONTINUED | OUTPATIENT
Start: 2023-01-14 | End: 2023-01-19 | Stop reason: HOSPADM

## 2023-01-14 RX ORDER — ACETAMINOPHEN 325 MG/1
650 TABLET ORAL EVERY 4 HOURS PRN
Status: DISCONTINUED | OUTPATIENT
Start: 2023-01-14 | End: 2023-01-19 | Stop reason: HOSPADM

## 2023-01-14 RX ORDER — SODIUM CHLORIDE 0.9 % (FLUSH) 0.9 %
10 SYRINGE (ML) INJECTION EVERY 12 HOURS PRN
Status: DISCONTINUED | OUTPATIENT
Start: 2023-01-14 | End: 2023-01-19 | Stop reason: HOSPADM

## 2023-01-14 RX ORDER — IBUPROFEN 200 MG
16 TABLET ORAL
Status: DISCONTINUED | OUTPATIENT
Start: 2023-01-14 | End: 2023-01-19 | Stop reason: HOSPADM

## 2023-01-14 RX ORDER — MORPHINE SULFATE 4 MG/ML
4 INJECTION, SOLUTION INTRAMUSCULAR; INTRAVENOUS
Status: COMPLETED | OUTPATIENT
Start: 2023-01-14 | End: 2023-01-14

## 2023-01-14 RX ORDER — ONDANSETRON 2 MG/ML
4 INJECTION INTRAMUSCULAR; INTRAVENOUS EVERY 6 HOURS PRN
Status: DISCONTINUED | OUTPATIENT
Start: 2023-01-14 | End: 2023-01-19 | Stop reason: HOSPADM

## 2023-01-14 RX ORDER — IBUPROFEN 200 MG
24 TABLET ORAL
Status: DISCONTINUED | OUTPATIENT
Start: 2023-01-14 | End: 2023-01-19 | Stop reason: HOSPADM

## 2023-01-14 RX ADMIN — ONDANSETRON 4 MG: 2 INJECTION INTRAMUSCULAR; INTRAVENOUS at 12:01

## 2023-01-14 RX ADMIN — PIPERACILLIN SODIUM AND TAZOBACTAM SODIUM 4.5 G: 4; .5 INJECTION, POWDER, LYOPHILIZED, FOR SOLUTION INTRAVENOUS at 03:01

## 2023-01-14 RX ADMIN — SODIUM CHLORIDE, POTASSIUM CHLORIDE, SODIUM LACTATE AND CALCIUM CHLORIDE 1000 ML: 600; 310; 30; 20 INJECTION, SOLUTION INTRAVENOUS at 12:01

## 2023-01-14 RX ADMIN — MORPHINE SULFATE 4 MG: 4 INJECTION INTRAVENOUS at 12:01

## 2023-01-14 RX ADMIN — SODIUM CHLORIDE, POTASSIUM CHLORIDE, SODIUM LACTATE AND CALCIUM CHLORIDE: 600; 310; 30; 20 INJECTION, SOLUTION INTRAVENOUS at 07:01

## 2023-01-14 RX ADMIN — ONDANSETRON 4 MG: 2 INJECTION INTRAMUSCULAR; INTRAVENOUS at 10:01

## 2023-01-14 RX ADMIN — MORPHINE SULFATE 4 MG: 4 INJECTION INTRAVENOUS at 05:01

## 2023-01-14 NOTE — Clinical Note
Diagnosis: RUQ abdominal pain [734698]   Admitting Provider:: CHELSY THOMPSON [[id number]]   Future Attending Provider: CHELSY THOMPSON [[id number]]   Reason for IP Medical Treatment  (Clinical interventions that can only be accomplished in the IP setting? ) :: biliary obstruction   Estimated Length of Stay:: 2 midnights   I certify that Inpatient services for greater than or equal to 2 midnights are medically necessary:: Yes   Plans for Post-Acute care--if anticipated (pick the single best option):: A. No post acute care anticipated at this time

## 2023-01-14 NOTE — HPI
Brooke Moss is a 47 y.o. female who presents with abdominal pain. Patient states that pain woke her up from sleep on Friday and was RUQ and sharp, with radiations to back. She was diaphoretic and felt chills. It persisted throughout the day and then she started having nausea and vomiting. Reported vomiting ~12 times, unable to tolerate anything po. Did not see any blood. No changes in bowel habits. After vomiting so much, she started to have burning epigastric pain but the abdominal pain did improve a little bit, and she was able to sleep some Friday night. However, pain returned this morning and she presented to ER. She denies having had fever, chest pain, shortness of breath, urinary changes, lower extremity edema. She reports social EtOH use, with no recent increase in consumption, no changes in foods. She reports her sister recently had her gallbladder removed. Denies previous history of similar pain.

## 2023-01-14 NOTE — SUBJECTIVE & OBJECTIVE
No past medical history on file.    No past surgical history on file.    Review of patient's allergies indicates:   Allergen Reactions    Iodine and iodide containing products     Shellfish containing products        No current facility-administered medications on file prior to encounter.     Current Outpatient Medications on File Prior to Encounter   Medication Sig    azelastine (ASTELIN) 137 mcg (0.1 %) nasal spray 2 sprays (274 mcg total) by Nasal route 2 (two) times daily.    methocarbamoL (ROBAXIN) 500 MG Tab Take 1 tablet (500 mg total) by mouth nightly as needed (back pain).    mupirocin (BACTROBAN) 2 % ointment Apply topically 4 (four) times daily. (Patient not taking: No sig reported)    mupirocin (BACTROBAN) 2 % ointment Apply to affected area 3 times daily (Patient not taking: No sig reported)     Family History    None       Tobacco Use    Smoking status: Every Day     Types: Vaping with nicotine, Cigarettes    Smokeless tobacco: Never   Substance and Sexual Activity    Alcohol use: No    Drug use: No    Sexual activity: Not Currently     Review of Systems   Constitutional:  Positive for chills. Negative for fever.   HENT:  Negative for congestion and sore throat.    Respiratory:  Negative for cough, chest tightness and shortness of breath.    Cardiovascular:  Negative for chest pain and leg swelling.   Gastrointestinal:  Positive for abdominal pain, nausea and vomiting. Negative for blood in stool, constipation and diarrhea.   Genitourinary:  Negative for dysuria, frequency and urgency.   Musculoskeletal:  Negative for arthralgias and myalgias.   Skin:  Negative for color change and pallor.   Psychiatric/Behavioral:  Negative for agitation and confusion.    Objective:     Vital Signs (Most Recent):  Temp: 98.4 °F (36.9 °C) (01/14/23 1615)  Pulse: 66 (01/14/23 1615)  Resp: 18 (01/14/23 1615)  BP: 128/81 (01/14/23 1615)  SpO2: 97 % (01/14/23 1615) Vital Signs (24h Range):  Temp:  [98.4 °F (36.9 °C)-98.5  °F (36.9 °C)] 98.4 °F (36.9 °C)  Pulse:  [66-71] 66  Resp:  [16-24] 18  SpO2:  [97 %-98 %] 97 %  BP: (128-181)/() 128/81     Weight: 117.9 kg (260 lb)  Body mass index is 40.72 kg/m².    Physical Exam  Constitutional:       General: She is not in acute distress.     Appearance: Normal appearance. She is obese. She is not toxic-appearing or diaphoretic.   HENT:      Head: Normocephalic and atraumatic.   Cardiovascular:      Rate and Rhythm: Normal rate and regular rhythm.      Heart sounds: No murmur heard.    No friction rub. No gallop.   Pulmonary:      Effort: Pulmonary effort is normal. No respiratory distress.      Breath sounds: Normal breath sounds. No wheezing or rales.   Abdominal:      General: Abdomen is flat. There is no distension.      Palpations: Abdomen is soft.      Tenderness: There is abdominal tenderness. There is no guarding or rebound.   Musculoskeletal:      Right lower leg: No edema.      Left lower leg: No edema.   Skin:     General: Skin is warm and dry.   Neurological:      General: No focal deficit present.      Mental Status: She is alert and oriented to person, place, and time.           Significant Labs: All pertinent labs within the past 24 hours have been reviewed.    Significant Imaging: I have reviewed all pertinent imaging results/findings within the past 24 hours.

## 2023-01-14 NOTE — ED PROVIDER NOTES
"Encounter Date: 1/14/2023       History     Chief Complaint   Patient presents with    Abdominal Pain     RUQ pain worse after eating.      The history is provided by the patient and medical records. No  was used.     Brooke Moss is a 47 y.o. female with medical history of diverticulitis, tobacco use presenting to the ED with the chief complaint of abdominal pain.     Reports waking up yesterday with RUQ "burning" abdominal pain that radiates to her back. Pain worsens about 20 minutes after eating solids and liquids. Patient is concerned about her gallbladder as her sister had hers removed about 1 month ago. No history of abdominal surgeries. Social ETOH use. No daily medications. Last BM today and normal. No fever.      Review of patient's allergies indicates:   Allergen Reactions    Iodine and iodide containing products     Shellfish containing products      History reviewed. No pertinent past medical history.  History reviewed. No pertinent surgical history.  History reviewed. No pertinent family history.  Social History     Tobacco Use    Smoking status: Every Day     Types: Vaping with nicotine, Cigarettes    Smokeless tobacco: Never   Substance Use Topics    Alcohol use: No    Drug use: No     Review of Systems   Gastrointestinal:  Positive for abdominal pain.     Physical Exam     Initial Vitals [01/14/23 1132]   BP Pulse Resp Temp SpO2   (!) 181/110 71 16 98.5 °F (36.9 °C) 98 %      MAP       --         Physical Exam    Constitutional: She appears well-developed and well-nourished. She is not diaphoretic. No distress.   HENT:   Head: Normocephalic and atraumatic.   Mouth/Throat: Oropharynx is clear and moist. No oropharyngeal exudate.   Eyes: Conjunctivae and EOM are normal. Pupils are equal, round, and reactive to light. No scleral icterus.   Neck: Neck supple.   Normal range of motion.  Cardiovascular:  Normal rate and regular rhythm.           Pulmonary/Chest: Breath sounds " normal. No respiratory distress. She has no wheezes.   Abdominal: Abdomen is soft. She exhibits no distension. There is abdominal tenderness (RUQ).   No CVA tenderness There is no rebound.   Musculoskeletal:         General: No tenderness or edema. Normal range of motion.      Cervical back: Normal range of motion and neck supple.     Neurological: She is alert and oriented to person, place, and time. She has normal strength. No sensory deficit.   Skin: Skin is warm and dry. No rash noted. No erythema.   Psychiatric: She has a normal mood and affect.       ED Course   Procedures  Labs Reviewed   CBC W/ AUTO DIFFERENTIAL - Abnormal; Notable for the following components:       Result Value    Lymph # 0.9 (*)     Gran % 76.9 (*)     Lymph % 12.9 (*)     All other components within normal limits   COMPREHENSIVE METABOLIC PANEL - Abnormal; Notable for the following components:    Sodium 134 (*)     CO2 20 (*)     Albumin 3.3 (*)     Total Bilirubin 6.1 (*)     Alkaline Phosphatase 160 (*)      (*)     ALT 1,212 (*)     All other components within normal limits   URINALYSIS, REFLEX TO URINE CULTURE - Abnormal; Notable for the following components:    Protein, UA 1+ (*)     Ketones, UA 3+ (*)     Bilirubin (UA) 2+ (*)     Occult Blood UA 2+ (*)     All other components within normal limits    Narrative:     Specimen Source->Urine   URINALYSIS MICROSCOPIC - Abnormal; Notable for the following components:    RBC, UA 21 (*)     All other components within normal limits    Narrative:     Specimen Source->Urine   ACETAMINOPHEN LEVEL - Abnormal; Notable for the following components:    Acetaminophen (Tylenol), Serum <3.0 (*)     All other components within normal limits   LIPASE   HEPATITIS PANEL, ACUTE   PROTIME-INR   APTT   POCT URINE PREGNANCY          Imaging Results               US Abdomen Limited (Gallbladder) (Final result)  Result time 01/14/23 14:44:30      Final result by Zeeshan Yates MD (01/14/23 14:44:30)                    Impression:      Cholelithiasis and gallbladder sludge with upper limits of normal wall thickness and reportedly positive sonographic Shell sign concerning for acute cholecystitis, although no pericholecystic fluid or wall hyperemia at this time.  There is suspected superimposed gallbladder adenomyomatosis.  Surgical consultation may be warranted.    Upper limits of normal common bile duct caliber at 5-6 mm without intrahepatic biliary ductal dilatation.    Suspected hepatic steatosis.    Right renal 3.2 cm mildly complex cyst.    This report was flagged in Epic as abnormal.      Electronically signed by: Zeeshan Yates MD  Date:    01/14/2023  Time:    14:44               Narrative:    EXAMINATION:  US ABDOMEN LIMITED    CLINICAL HISTORY:  Right upper quadrant pain    TECHNIQUE:  Limited ultrasound of the right upper quadrant of the abdomen targeted on the biliary system.    COMPARISON:  CT abdomen and pelvis 09/30/2017    FINDINGS:  Gallbladder: Well distended containing mobile sludge and multiple mobile stones with the largest measuring 8 mm.  There are several small echogenic foci ranging 2-3 mm along the gallbladder wall some of which demonstrate comet tail artifact suggestive of adenomyomatosis.  Gallbladder wall is upper limits of normal in thickness at 3-4 mm.  Patient reportedly states tenderness over the gallbladder during the examination suggesting positive sonographic Shell sign.  No pericholecystic fluid or associated wall hyperemia.    Biliary system: The common duct is upper limits of normal in caliber, measuring up to 5-6 mm.  No intrahepatic ductal dilatation.    Miscellaneous: No upper abdominal ascites.  Imaged portions of the pancreas are within normal limits.  No right hydronephrosis.  Incidentally noted 3.2 cm well-circumscribed hypoechoic cortical mass with thin septation in the right renal upper pole consistent with a mildly complex cyst.  Liver measures 17 cm in length with  diffuse parenchymal echogenicity suggesting fatty infiltration.                                       Medications   sodium chloride 0.9% flush 10 mL (has no administration in time range)   naloxone 0.4 mg/mL injection 0.02 mg (has no administration in time range)   glucose chewable tablet 16 g (has no administration in time range)   glucose chewable tablet 24 g (has no administration in time range)   glucagon (human recombinant) injection 1 mg (has no administration in time range)   lactated ringers infusion (has no administration in time range)   morphine injection 4 mg (has no administration in time range)   acetaminophen tablet 650 mg (has no administration in time range)   dextrose 10% bolus 125 mL 125 mL (has no administration in time range)   dextrose 10% bolus 250 mL 250 mL (has no administration in time range)   ondansetron injection 4 mg (has no administration in time range)   morphine injection 4 mg (4 mg Intravenous Given 1/14/23 1239)   ondansetron injection 4 mg (4 mg Intravenous Given 1/14/23 1240)   lactated ringers bolus 1,000 mL (0 mLs Intravenous Stopped 1/14/23 1526)   piperacillin-tazobactam (ZOSYN) 4.5 g in dextrose 5 % in water (D5W) 5 % 100 mL IVPB (MB+) (0 g Intravenous Stopped 1/14/23 1552)     Medical Decision Making:   History:   Old Medical Records: I decided to obtain old medical records.  Old Records Summarized: records from previous admission(s) and records from clinic visits.  Clinical Tests:   Lab Tests: Ordered and Reviewed  Radiological Study: Ordered and Reviewed  Other:   I have discussed this case with another health care provider.       <> Summary of the Discussion: Hospital Medicine, General Surgery, Gastroenterology     APC / Resident Notes:   47 y.o. female with medical history of diverticulitis, tobacco use presenting to the ED c/o 2 days of RUQ abdominal pain. DDx includes but not limited to acute cholecystitis, biliary colic, gastroenteritis, pancreatitis, hernia,  dyspepsia, GERD. Lower suspicion for diverticulitis, appendicitis, nephrolithiasis.      Attending Attestation:     Physician Attestation Statement for NP/PA:   I discussed this assessment and plan of this patient with the NP/PA, but I did not personally examine the patient. The face to face encounter was performed by the NP/PA.    Other NP/PA Attestation Additions:      Medical Decision Making: I completed the substantive portion of the MDM of this patient, including review of lab workup, imaging, and recommendation of consultant discussions as well as the decision to admit.            ED Course as of 01/14/23 1721   Sat Jan 14, 2023   1550 BILIRUBIN TOTAL(!): 6.1 [BA]   1550 AST(!): 895 [BA]   1550 ALT(!): 1,212 [BA]   1550 LFTs significantly elevated with U/S showing Cholelithiasis and gallbladder sludge with upper limits of normal wall thickness and reportedly positive sonographic Shell sign concerning for acute cholecystitis. [BA]   1550 Gen surgery consulted and will see the patient. Recommending AES eval for choledocholithiasis concerns. AES consulted. Zosyn ordered for empirical coverage. Discussed with HM, admitted for ongoing management. Patient expresses understanding and agreeable to the plan. I have discussed the care of this patient with my supervising physician.  [BA]      ED Course User Index  [BA] Zeeshan Lane PA-C                 Clinical Impression:   Final diagnoses:  [R10.11] RUQ abdominal pain  [K83.1] Biliary obstruction  [K80.01] Calculus of gallbladder with acute cholecystitis and obstruction (Primary)        ED Disposition Condition    Admit Stable                Zeeshan Lane PA-C  01/14/23 1721       Christy Travis MD  01/15/23 1209

## 2023-01-14 NOTE — ASSESSMENT & PLAN NOTE
47 y.o. female who presents with RUQ abdominal pain, nausea and vomiting.  - afebrile and hemodynamically stable.  - Labs with Tb elevated 6.1, , ALT 1212, lipase wnl.   - no leukocytosis, afebrile  - Ultrasound with cholelithiasis and gallbladder sludge with upper limits of normal wall thickness and reportedly positive sonographic Shell sign concerning for acute cholecystitis, although no pericholecystic fluid or wall hyperemia at this time.  There is suspected superimposed gallbladder adenomyomatosis.   - General surgery and AES consulted.  - Admitted to hospital medicine  - IVF, NPO, Pain control with morphine  - Obtain MRCP

## 2023-01-14 NOTE — H&P
[facility] - Emergency Dept  [facility] Medicine  History & Physical    Patient Name: Brooke Moss  MRN: [id number]  Patient Class: IP- Inpatient  Admission Date: 1/14/2023  Attending Physician: Saji Reyes*   Primary Care Provider: Primary Doctor No         Patient information was obtained from patient, past medical records and ER records.     Subjective:     Principal Problem:Right upper quadrant abdominal pain    Chief Complaint:   Chief Complaint   Patient presents with    Abdominal Pain     RUQ pain worse after eating.         HPI: Brooke Moss is a 47 y.o. female who presents with abdominal pain. Patient states that pain woke her up from sleep on Friday and was RUQ and sharp, with radiations to back. She was diaphoretic and felt chills. It persisted throughout the day and then she started having nausea and vomiting. Reported vomiting ~12 times, unable to tolerate anything po. Did not see any blood. No changes in bowel habits. After vomiting so much, she started to have burning epigastric pain but the abdominal pain did improve a little bit, and she was able to sleep some Friday night. However, pain returned this morning and she presented to ER. She denies having had fever, chest pain, shortness of breath, urinary changes, lower extremity edema. She reports social EtOH use, with no recent increase in consumption, no changes in foods. She reports her sister recently had her gallbladder removed. Denies previous history of similar pain. In the Ed, patient was afebrile and hemodynamically stable. Labs with Tb elevated 6.1, , ALT 1212, lipase wnl. Ultrasound with cholelithiasis and gallbladder sludge with upper limits of normal wall thickness and reportedly positive sonographic Shell sign concerning for acute cholecystitis, although no pericholecystic fluid or wall hyperemia at this time.  There is suspected superimposed gallbladder adenomyomatosis. General surgery and AES consulted.  Patient was admitted to hospital medicine.      No past medical history on file.    No past surgical history on file.    Review of patient's allergies indicates:   Allergen Reactions    Iodine and iodide containing products     Shellfish containing products        No current facility-administered medications on file prior to encounter.     Current Outpatient Medications on File Prior to Encounter   Medication Sig    azelastine (ASTELIN) 137 mcg (0.1 %) nasal spray 2 sprays (274 mcg total) by Nasal route 2 (two) times daily.    methocarbamoL (ROBAXIN) 500 MG Tab Take 1 tablet (500 mg total) by mouth nightly as needed (back pain).    mupirocin (BACTROBAN) 2 % ointment Apply topically 4 (four) times daily. (Patient not taking: No sig reported)    mupirocin (BACTROBAN) 2 % ointment Apply to affected area 3 times daily (Patient not taking: No sig reported)     Family History    None       Tobacco Use    Smoking status: Every Day     Types: Vaping with nicotine, Cigarettes    Smokeless tobacco: Never   Substance and Sexual Activity    Alcohol use: No    Drug use: No    Sexual activity: Not Currently     Review of Systems   Constitutional:  Positive for chills. Negative for fever.   HENT:  Negative for congestion and sore throat.    Respiratory:  Negative for cough, chest tightness and shortness of breath.    Cardiovascular:  Negative for chest pain and leg swelling.   Gastrointestinal:  Positive for abdominal pain, nausea and vomiting. Negative for blood in stool, constipation and diarrhea.   Genitourinary:  Negative for dysuria, frequency and urgency.   Musculoskeletal:  Negative for arthralgias and myalgias.   Skin:  Negative for color change and pallor.   Psychiatric/Behavioral:  Negative for agitation and confusion.    Objective:     Vital Signs (Most Recent):  Temp: 98.4 °F (36.9 °C) (01/14/23 1615)  Pulse: 66 (01/14/23 1615)  Resp: 18 (01/14/23 1615)  BP: 128/81 (01/14/23 1615)  SpO2: 97 % (01/14/23 1615)  Vital Signs (24h Range):  Temp:  [98.4 °F (36.9 °C)-98.5 °F (36.9 °C)] 98.4 °F (36.9 °C)  Pulse:  [66-71] 66  Resp:  [16-24] 18  SpO2:  [97 %-98 %] 97 %  BP: (128-181)/() 128/81     Weight: 117.9 kg (260 lb)  Body mass index is 40.72 kg/m².    Physical Exam  Constitutional:       General: She is not in acute distress.     Appearance: Normal appearance. She is obese. She is not toxic-appearing or diaphoretic.   HENT:      Head: Normocephalic and atraumatic.   Cardiovascular:      Rate and Rhythm: Normal rate and regular rhythm.      Heart sounds: No murmur heard.    No friction rub. No gallop.   Pulmonary:      Effort: Pulmonary effort is normal. No respiratory distress.      Breath sounds: Normal breath sounds. No wheezing or rales.   Abdominal:      General: Abdomen is flat. There is no distension.      Palpations: Abdomen is soft.      Tenderness: There is abdominal tenderness. There is no guarding or rebound.   Musculoskeletal:      Right lower leg: No edema.      Left lower leg: No edema.   Skin:     General: Skin is warm and dry.   Neurological:      General: No focal deficit present.      Mental Status: She is alert and oriented to person, place, and time.           Significant Labs: All pertinent labs within the past 24 hours have been reviewed.    Significant Imaging: I have reviewed all pertinent imaging results/findings within the past 24 hours.    Assessment/Plan:     * Right upper quadrant abdominal pain  47 y.o. female who presents with RUQ abdominal pain, nausea and vomiting.  - afebrile and hemodynamically stable.  - Labs with Tb elevated 6.1, , ALT 1212, lipase wnl.   - no leukocytosis, afebrile  - Ultrasound with cholelithiasis and gallbladder sludge with upper limits of normal wall thickness and reportedly positive sonographic Shell sign concerning for acute cholecystitis, although no pericholecystic fluid or wall hyperemia at this time.  There is suspected superimposed gallbladder  adenomyomatosis.   - General surgery and AES consulted.  - Admitted to hospital medicine  - IVF, NPO, Pain control with morphine  - Obtain MRCP      VTE Risk Mitigation (From admission, onward)         Ordered     IP VTE HIGH RISK PATIENT  Once         01/14/23 1642     Place sequential compression device  Until discontinued         01/14/23 1642                   Saji Medina MD  Department of Hospital Medicine   [facility] - Emergency Dept

## 2023-01-15 LAB
ALBUMIN SERPL BCP-MCNC: 3 G/DL (ref 3.5–5.2)
ALP SERPL-CCNC: 162 U/L (ref 55–135)
ALT SERPL W/O P-5'-P-CCNC: 1066 U/L (ref 10–44)
ANION GAP SERPL CALC-SCNC: 9 MMOL/L (ref 8–16)
AST SERPL-CCNC: 601 U/L (ref 10–40)
BASOPHILS # BLD AUTO: 0.05 K/UL (ref 0–0.2)
BASOPHILS NFR BLD: 0.7 % (ref 0–1.9)
BILIRUB SERPL-MCNC: 6.3 MG/DL (ref 0.1–1)
BUN SERPL-MCNC: 7 MG/DL (ref 6–20)
CALCIUM SERPL-MCNC: 9.1 MG/DL (ref 8.7–10.5)
CHLORIDE SERPL-SCNC: 104 MMOL/L (ref 95–110)
CO2 SERPL-SCNC: 22 MMOL/L (ref 23–29)
CREAT SERPL-MCNC: 0.7 MG/DL (ref 0.5–1.4)
DIFFERENTIAL METHOD: ABNORMAL
EOSINOPHIL # BLD AUTO: 0.3 K/UL (ref 0–0.5)
EOSINOPHIL NFR BLD: 3.8 % (ref 0–8)
ERYTHROCYTE [DISTWIDTH] IN BLOOD BY AUTOMATED COUNT: 13.9 % (ref 11.5–14.5)
EST. GFR  (NO RACE VARIABLE): >60 ML/MIN/1.73 M^2
GLUCOSE SERPL-MCNC: 91 MG/DL (ref 70–110)
HCT VFR BLD AUTO: 41.6 % (ref 37–48.5)
HGB BLD-MCNC: 13 G/DL (ref 12–16)
IMM GRANULOCYTES # BLD AUTO: 0.02 K/UL (ref 0–0.04)
IMM GRANULOCYTES NFR BLD AUTO: 0.3 % (ref 0–0.5)
LYMPHOCYTES # BLD AUTO: 1.4 K/UL (ref 1–4.8)
LYMPHOCYTES NFR BLD: 20.1 % (ref 18–48)
MCH RBC QN AUTO: 28.3 PG (ref 27–31)
MCHC RBC AUTO-ENTMCNC: 31.3 G/DL (ref 32–36)
MCV RBC AUTO: 91 FL (ref 82–98)
MONOCYTES # BLD AUTO: 0.6 K/UL (ref 0.3–1)
MONOCYTES NFR BLD: 8.1 % (ref 4–15)
NEUTROPHILS # BLD AUTO: 4.7 K/UL (ref 1.8–7.7)
NEUTROPHILS NFR BLD: 67 % (ref 38–73)
NRBC BLD-RTO: 0 /100 WBC
PLATELET # BLD AUTO: 235 K/UL (ref 150–450)
PMV BLD AUTO: 10.5 FL (ref 9.2–12.9)
POTASSIUM SERPL-SCNC: 3.5 MMOL/L (ref 3.5–5.1)
PROT SERPL-MCNC: 6.7 G/DL (ref 6–8.4)
RBC # BLD AUTO: 4.59 M/UL (ref 4–5.4)
SODIUM SERPL-SCNC: 135 MMOL/L (ref 136–145)
WBC # BLD AUTO: 7.03 K/UL (ref 3.9–12.7)

## 2023-01-15 PROCEDURE — 85025 COMPLETE CBC W/AUTO DIFF WBC: CPT | Performed by: STUDENT IN AN ORGANIZED HEALTH CARE EDUCATION/TRAINING PROGRAM

## 2023-01-15 PROCEDURE — 36415 COLL VENOUS BLD VENIPUNCTURE: CPT | Performed by: STUDENT IN AN ORGANIZED HEALTH CARE EDUCATION/TRAINING PROGRAM

## 2023-01-15 PROCEDURE — 99232 SBSQ HOSP IP/OBS MODERATE 35: CPT | Mod: ,,, | Performed by: STUDENT IN AN ORGANIZED HEALTH CARE EDUCATION/TRAINING PROGRAM

## 2023-01-15 PROCEDURE — 20600001 HC STEP DOWN PRIVATE ROOM

## 2023-01-15 PROCEDURE — 80053 COMPREHEN METABOLIC PANEL: CPT | Performed by: STUDENT IN AN ORGANIZED HEALTH CARE EDUCATION/TRAINING PROGRAM

## 2023-01-15 PROCEDURE — 99232 PR SUBSEQUENT HOSPITAL CARE,LEVL II: ICD-10-PCS | Mod: ,,, | Performed by: STUDENT IN AN ORGANIZED HEALTH CARE EDUCATION/TRAINING PROGRAM

## 2023-01-15 PROCEDURE — 63600175 PHARM REV CODE 636 W HCPCS: Performed by: STUDENT IN AN ORGANIZED HEALTH CARE EDUCATION/TRAINING PROGRAM

## 2023-01-15 RX ADMIN — ONDANSETRON 4 MG: 2 INJECTION INTRAMUSCULAR; INTRAVENOUS at 05:01

## 2023-01-15 RX ADMIN — MORPHINE SULFATE 4 MG: 4 INJECTION INTRAVENOUS at 05:01

## 2023-01-15 RX ADMIN — SODIUM CHLORIDE, POTASSIUM CHLORIDE, SODIUM LACTATE AND CALCIUM CHLORIDE: 600; 310; 30; 20 INJECTION, SOLUTION INTRAVENOUS at 03:01

## 2023-01-15 RX ADMIN — SODIUM CHLORIDE, POTASSIUM CHLORIDE, SODIUM LACTATE AND CALCIUM CHLORIDE: 600; 310; 30; 20 INJECTION, SOLUTION INTRAVENOUS at 05:01

## 2023-01-15 NOTE — ASSESSMENT & PLAN NOTE
47 y.o. female who presents with RUQ abdominal pain, nausea and vomiting.  - afebrile and hemodynamically stable.  - Labs with Tb elevated 6.1, , ALT 1212, lipase wnl.   - no leukocytosis, afebrile  - Ultrasound with cholelithiasis and gallbladder sludge with upper limits of normal wall thickness and reportedly positive sonographic Shell sign concerning for acute cholecystitis, although no pericholecystic fluid or wall hyperemia at this time.  There is suspected superimposed gallbladder adenomyomatosis.   - General surgery and AES consulted.  - Admitted to hospital medicine  - IVF, Pain control with morphine  - MRCP with cholelithiasis, biliary sludge, and gallbladder adenomyomatosis.  No evidence of choledocholithiasis. Mild dilatation of the extra hepatic common bile duct, similar to findings on recent ultrasound.   - adat

## 2023-01-15 NOTE — HPI
Brooke Moss is a 47 y.o. female presenting with RUQ pain on 1/14/22. Labwork significant for Tbili 6.1 and LFTs of 895/1212. No leukocytosis, but slight left shift of 76%. US demonstrated GB wall thickening from adenomyosis and an abundance of gallstones and sludge, however there was no visible intra or extrahepatic biliary ductal dilation. Follow up MRCP was performed to rule out choledocholithiasis, but no biliary obstruction was seen (no CBD dilation, no common duct stone, no Mirizzi syndrome). General surgery consulted for evaluation of possible cholecystitis.     Pt states that she was in her usual state of health until Friday. She had epigastric pain, that migrated to her RUQ and radiated through to her back. Several episodes of NBNB emesis, dehydrated upon presentation. Notes cola-colored urine today. Last BM this AM, normal in color and consistency. ED concerned for jaundice, but patient and her mother state that this is her normal complexion (she likes to tan outside). No scleral icterus nor yellowing of her mucosal membranes.     PSH: no prior abdominal surgeries. Tonillectomy as a child, did well with anesthesia.  Substance: socially drinks about twice a month (2-6 beers on those days)  Social: immigrated from [place]. 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

## 2023-01-15 NOTE — PROGRESS NOTES
[facility] - [facility]  General Surgery  Progress Note    Subjective:     History of Present Illness:  Brooke Moss is a 47 y.o. female presenting with RUQ pain on 1/14/22. Labwork significant for Tbili 6.1 and LFTs of 895/1212. No leukocytosis, but slight left shift of 76%. US demonstrated GB wall thickening from adenomyosis and an abundance of gallstones and sludge, however there was no visible intra or extrahepatic biliary ductal dilation. Follow up MRCP was performed to rule out choledocholithiasis, but no biliary obstruction was seen (no CBD dilation, no common duct stone, no Mirizzi syndrome). General surgery consulted for evaluation of possible cholecystitis.     Pt states that she was in her usual state of health until Friday. She had epigastric pain, that migrated to her RUQ and radiated through to her back. Several episodes of NBNB emesis, dehydrated upon presentation. Notes cola-colored urine today. Last BM this AM, normal in color and consistency. ED concerned for jaundice, but patient and her mother state that this is her normal complexion (she likes to tan outside). No scleral icterus nor yellowing of her mucosal membranes.     PSH: no prior abdominal surgeries. Tonillectomy as a child, did well with anesthesia.  Substance: socially drinks about twice a month (2-6 beers on those days)  Social: immigrated from [place]. 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       Post-Op Info:  * No surgery found *         Interval History: NAEON, AF, VSS. Patient refers pain has improved from yesterday. Denies nausea/vomiting. No complaints at the time.      Medications:  Continuous Infusions:   lactated ringers 100 mL/hr at 01/15/23 0538     Scheduled Meds:  PRN Meds:acetaminophen, dextrose 10%, dextrose 10%, glucagon (human recombinant), glucose, glucose, morphine, naloxone, ondansetron, sodium chloride 0.9%     Review of patient's allergies indicates:   Allergen Reactions    Iodine and iodide  containing products     Shellfish containing products      Objective:     Vital Signs (Most Recent):  Temp: 97.4 °F (36.3 °C) (01/15/23 0710)  Pulse: 73 (01/15/23 0710)  Resp: 16 (01/15/23 0710)  BP: 114/77 (01/15/23 0710)  SpO2: 96 % (01/15/23 0710) Vital Signs (24h Range):  Temp:  [97 °F (36.1 °C)-98.5 °F (36.9 °C)] 97.4 °F (36.3 °C)  Pulse:  [63-78] 73  Resp:  [16-24] 16  SpO2:  [95 %-98 %] 96 %  BP: (105-181)/() 114/77     Weight: 117.9 kg (260 lb)  Body mass index is 40.72 kg/m².    Intake/Output - Last 3 Shifts         01/13 0700 01/14 0659 01/14 0700  01/15 0659 01/15 0700  01/16 0659    Urine (mL/kg/hr)  0     Emesis/NG output  200     Total Output  200     Net  -200            Urine Occurrence  2 x     Stool Occurrence   0 x            Physical Exam  Constitutional:       General: She is not in acute distress.     Appearance: She is well-developed. She is obese. She is not ill-appearing.   Neck:      Vascular: No JVD.      Trachea: No tracheal deviation.   Cardiovascular:      Rate and Rhythm: Normal rate and regular rhythm.   Pulmonary:      Effort: No respiratory distress.      Breath sounds: Normal breath sounds.   Abdominal:      Palpations: Abdomen is soft.      Comments: Abdomen is soft and non-distended. No surgical scars. Minimally tender in the RUQ and epigastrium. Not peritoneal.    Skin:     General: Skin is warm and dry.   Neurological:      Mental Status: She is alert and oriented to person, place, and time.       Significant Labs:  I have reviewed all pertinent lab results within the past 24 hours.  CBC:   Recent Labs   Lab 01/15/23  0242   WBC 7.03   RBC 4.59   HGB 13.0   HCT 41.6      MCV 91   MCH 28.3   MCHC 31.3*     CMP:   Recent Labs   Lab 01/15/23  0242   GLU 91   CALCIUM 9.1   ALBUMIN 3.0*   PROT 6.7   *   K 3.5   CO2 22*      BUN 7   CREATININE 0.7   ALKPHOS 162*   ALT 1,066*   *   BILITOT 6.3*       Significant Diagnostics:  I have reviewed all  pertinent imaging results/findings within the past 24 hours.    Assessment/Plan:     * Right upper quadrant abdominal pain  Boroke Moss is a 47 y.o. female with acute onset RUQ pain, N/V. Tbili 6.1 and LFTs 800/1200 on admit. US shows GB adenomyosis and stones/sludge, MRCP does not demonstrate any ductal obstruction. No imaging evidence to support choledocholithiasis at the current time.  Hepatitis panel negative. Serum tylenol level normal.   Possible that she had an obstructing CBD stone that passed prior to imaging. However, other medical causes of her hyperbilirubinemia still need to be evaluated.     - If LFTs/bili trend down, would likely plan for lap jessica on Tuesday (tentatively), with the presumptive diagnosis of resolved choledocholithiasis. Patient understands the need for medical workup, and is amenable to lap jessica if appropriate.   - Ok for diet until day of surgery  - Rest of care per primary  - Please feel free to contact General Surgery team if any questions, concerns, or changes in clinical status.         Maxx Riggs MD  General Surgery  [facility]

## 2023-01-15 NOTE — PROGRESS NOTES
[facility] Medicine  Progress Note    Patient Name: Brooke Moss  MRN: [id number]  Patient Class: IP- Inpatient   Admission Date: 1/14/2023  Length of Stay: 1 days  Attending Physician: Saji Medina  Primary Care Provider: Primary Doctor No        Subjective:     Principal Problem:Right upper quadrant abdominal pain        HPI:  Brooke Moss is a 47 y.o. female who presents with abdominal pain. Patient states that pain woke her up from sleep on Friday and was RUQ and sharp, with radiations to back. She was diaphoretic and felt chills. It persisted throughout the day and then she started having nausea and vomiting. Reported vomiting ~12 times, unable to tolerate anything po. Did not see any blood. No changes in bowel habits. After vomiting so much, she started to have burning epigastric pain but the abdominal pain did improve a little bit, and she was able to sleep some Friday night. However, pain returned this morning and she presented to ER. She denies having had fever, chest pain, shortness of breath, urinary changes, lower extremity edema. She reports social EtOH use, with no recent increase in consumption, no changes in foods. She reports her sister recently had her gallbladder removed. Denies previous history of similar pain.       Overview/Hospital Course:  In the Ed, patient was afebrile and hemodynamically stable. Labs with Tb elevated 6.1, , ALT 1212, lipase wnl. Ultrasound with cholelithiasis and gallbladder sludge with upper limits of normal wall thickness and reportedly positive sonographic Shell sign concerning for acute cholecystitis, although no pericholecystic fluid or wall hyperemia at this time.  There is suspected superimposed gallbladder adenomyomatosis. General surgery and AES consulted. Patient was admitted to hospital medicine. MRCP with cholelithiasis, biliary sludge, and gallbladder adenomyomatosis.  No evidence of choledocholithiasis. Mild  dilatation of the extra hepatic common bile duct, similar to findings on recent ultrasound.       Interval History: Pt reports pain has improved, has not needed further morphine prn since yesterday. Tolerating clears.  AST/ALT trended down    Review of Systems  Objective:     Vital Signs (Most Recent):  Temp: 98.2 °F (36.8 °C) (01/15/23 1130)  Pulse: 64 (01/15/23 1130)  Resp: 20 (01/15/23 1130)  BP: 128/70 (01/15/23 1130)  SpO2: 96 % (01/15/23 1130) Vital Signs (24h Range):  Temp:  [97 °F (36.1 °C)-98.4 °F (36.9 °C)] 98.2 °F (36.8 °C)  Pulse:  [63-78] 64  Resp:  [16-20] 20  SpO2:  [95 %-97 %] 96 %  BP: (105-128)/(54-81) 128/70     Weight: 117.9 kg (260 lb)  Body mass index is 40.72 kg/m².    Intake/Output Summary (Last 24 hours) at 1/15/2023 1322  Last data filed at 1/14/2023 1913  Gross per 24 hour   Intake --   Output 200 ml   Net -200 ml      Physical Exam  Constitutional:       General: She is not in acute distress.     Appearance: Normal appearance. She is obese. She is not toxic-appearing or diaphoretic.   HENT:      Head: Normocephalic and atraumatic.   Cardiovascular:      Rate and Rhythm: Normal rate and regular rhythm.      Heart sounds: No murmur heard.    No friction rub. No gallop.   Pulmonary:      Effort: Pulmonary effort is normal. No respiratory distress.      Breath sounds: Normal breath sounds. No wheezing or rales.   Abdominal:      General: Abdomen is flat. There is no distension.      Palpations: Abdomen is soft.      Tenderness: There is abdominal tenderness. There is no guarding or rebound.   Musculoskeletal:      Right lower leg: No edema.      Left lower leg: No edema.   Skin:     General: Skin is warm and dry.   Neurological:      General: No focal deficit present.      Mental Status: She is alert and oriented to person, place, and time.       MELD-Na score: 16 at 1/15/2023  2:42 AM  MELD score: 14 at 1/15/2023  2:42 AM  Calculated from:  Serum Creatinine: 0.7 mg/dL (Using min of 1 mg/dL)  at 1/15/2023  2:42 AM  Serum Sodium: 135 mmol/L at 1/15/2023  2:42 AM  Total Bilirubin: 6.3 mg/dL at 1/15/2023  2:42 AM  INR(ratio): 1.1 at 1/14/2023  3:07 PM  Age: 47 years    Significant Labs:  CBC:  Recent Labs   Lab 01/14/23  1247 01/15/23  0242   WBC 7.21 7.03   HGB 14.3 13.0   HCT 43.1 41.6    235     CMP:  Recent Labs   Lab 01/14/23  1247 01/15/23  0242   * 135*   K 3.6 3.5    104   CO2 20* 22*    91   BUN 8 7   CREATININE 0.6 0.7   CALCIUM 9.6 9.1   PROT 7.7 6.7   ALBUMIN 3.3* 3.0*   BILITOT 6.1* 6.3*   ALKPHOS 160* 162*   * 601*   ALT 1,212* 1,066*   ANIONGAP 12 9     PTINR:  Recent Labs   Lab 01/14/23  1507   INR 1.1       Significant Procedures:   Dobutamine Stress Test with Color Flow: No results found for this or any previous visit.      Assessment/Plan:      * Right upper quadrant abdominal pain  47 y.o. female who presents with RUQ abdominal pain, nausea and vomiting.  - afebrile and hemodynamically stable.  - Labs with Tb elevated 6.1, , ALT 1212, lipase wnl.   - no leukocytosis, afebrile  - Ultrasound with cholelithiasis and gallbladder sludge with upper limits of normal wall thickness and reportedly positive sonographic Shell sign concerning for acute cholecystitis, although no pericholecystic fluid or wall hyperemia at this time.  There is suspected superimposed gallbladder adenomyomatosis.   - General surgery and AES consulted.  - Admitted to hospital medicine  - IVF, Pain control with morphine  - MRCP with cholelithiasis, biliary sludge, and gallbladder adenomyomatosis.  No evidence of choledocholithiasis. Mild dilatation of the extra hepatic common bile duct, similar to findings on recent ultrasound.   - adat    VTE Risk Mitigation (From admission, onward)         Ordered     IP VTE HIGH RISK PATIENT  Once         01/14/23 1642     Place sequential compression device  Until discontinued         01/14/23 1642                Discharge Planning   NARAYAN:       Code Status: Full Code   Is the patient medically ready for discharge?:     Reason for patient still in hospital (select all that apply): Patient trending condition, Treatment and Consult recommendations         Saji Medina MD  Department of Hospital Medicine   [facility]  [facility]

## 2023-01-15 NOTE — CONSULTS
[facility]  AES  Consult Note    Patient Name: Brooke Moss  MRN: [id number]  Admission Date: 1/14/2023  Hospital Length of Stay: 0 days  Code Status: Full Code   Attending Provider: Saji Medina   Consulting Provider: Tab Quiroga MD  Primary Care Physician: Primary Doctor No  Principal Problem:Right upper quadrant abdominal pain    Inpatient consult to [facility] ([facility])  Consult performed by: Tab Quiroga MD  Consult ordered by: Zeeshan Lane PA-C      Subjective:     HPI: Brooke Moss is a 47 y.o. female with no significant PMH who presents for acute onset RUQ pain started yesterday morning. Awoke her from sleep. Severe, radiating to back, associated with n/v and diaphoresis. PO intolerant throughout the day. Denies fevers. Denies any prior history of similar episodes of abdominal pain.  HDS in ED. Labs showed Tbili 6.1,  and ALT 1212. US shows CBD 5-6 mm, upper limit of normal, along with cholelithiasis and GB sludge with positive sonographic Shell sign. Suspected superimposed gallbladder adenomyomatosis. Gen surg and AES consulted.        History reviewed. No pertinent past medical history.    Past Surgical History:   Procedure Laterality Date    INCISION AND DRAINAGE, PILONIDAL CYST, SIMPLE  02/19/2021       History reviewed. No pertinent family history.    Social History     Socioeconomic History    Marital status: Single   Tobacco Use    Smoking status: Every Day     Types: Vaping with nicotine, Cigarettes    Smokeless tobacco: Never   Substance and Sexual Activity    Alcohol use: No    Drug use: No    Sexual activity: Not Currently       No current facility-administered medications on file prior to encounter.     Current Outpatient Medications on File Prior to Encounter   Medication Sig Dispense Refill    azelastine (ASTELIN) 137 mcg (0.1 %) nasal spray 2 sprays (274 mcg total) by Nasal route 2 (two) times daily. 30 mL 0     methocarbamoL (ROBAXIN) 500 MG Tab Take 1 tablet (500 mg total) by mouth nightly as needed (back pain). 15 tablet 0    mupirocin (BACTROBAN) 2 % ointment Apply topically 4 (four) times daily. (Patient not taking: No sig reported) 1 Tube 0    mupirocin (BACTROBAN) 2 % ointment Apply to affected area 3 times daily (Patient not taking: No sig reported) 22 g 0       Review of patient's allergies indicates:   Allergen Reactions    Iodine and iodide containing products     Shellfish containing products        Review of Systems   Constitutional:  Positive for diaphoresis. Negative for chills and fever.   HENT:  Negative for congestion and sore throat.    Eyes:  Negative for blurred vision and double vision.   Respiratory:  Negative for cough and shortness of breath.    Cardiovascular:  Negative for chest pain and palpitations.   Gastrointestinal:         See HPI   Genitourinary:  Negative for frequency and urgency.   Musculoskeletal:  Negative for joint pain and myalgias.   Skin:  Negative for itching and rash.   Neurological:  Negative for sensory change and focal weakness.      Objective:     Vitals:    01/14/23 1754   BP:    Pulse:    Resp: 20   Temp:          Constitutional:  not in acute distress and well developed  HENT: Head: Normal, normocephalic, atraumatic.  Eyes: conjunctiva clear and sclera nonicteric  Cardiovascular: regular rate and rhythm and no murmur  Respiratory: normal chest expansion & respiratory effort   and no accessory muscle use  GI: soft, RUQ tenderness, no rebound or guarding, without masses or organomegaly  Musculoskeletal: no muscular tenderness noted  Skin: normal color  Neurological: alert, oriented x3  Psychiatric: mood and affect are within normal limits, pt is a good historian; no memory problems were noted      Significant Labs:  Recent Labs   Lab 01/14/23  1247   HGB 14.3       Lab Results   Component Value Date    WBC 7.21 01/14/2023    HGB 14.3 01/14/2023    HCT 43.1 01/14/2023    MCV  88 01/14/2023     01/14/2023       Lab Results   Component Value Date     (L) 01/14/2023    K 3.6 01/14/2023     01/14/2023    CO2 20 (L) 01/14/2023    BUN 8 01/14/2023    CREATININE 0.6 01/14/2023    CALCIUM 9.6 01/14/2023    ANIONGAP 12 01/14/2023    ESTGFRAFRICA >60 09/30/2017    EGFRNONAA >60 09/30/2017       Lab Results   Component Value Date    ALT 1,212 (H) 01/14/2023     (H) 01/14/2023    ALKPHOS 160 (H) 01/14/2023    BILITOT 6.1 (H) 01/14/2023       Lab Results   Component Value Date    INR 1.1 01/14/2023       Significant Imaging:  Reviewed pertinent radiology findings.       Assessment/Plan:     Brooke Moss is a 47 y.o. female with no significant PMH who presents with acute RUQ pain with hyperbilirubinemia and significant elevation in transaminases, US showing cholelithiasis and and borderline GB wall thickening with positive sonographic Shell sign. Acute hepatitis panel negative. Common bile duct within normal limits, no definitive obstruction seen on US but with degree of LFT elevation will obtain MRCP to better evaluate for choledocholithiasis.    Problem List:  Symptomatic cholelithiasis vs mild acute cholecystitis  Elevated LFTs    Recommendations:  - Obtain MRCP  - Continue IV zosyn  - Appreciate gen surg recs    Thank you for involving us in the care of Brooke Moss. Please call with any additional questions, concerns or changes in the patient's clinical status. We will continue to follow.     Tab Quiroga MD  Gastroenterology Fellow PGY IV  [facility]

## 2023-01-15 NOTE — CONSULTS
[facility]  General Surgery  Consult Note    Patient Name: Brooke Moss  MRN: [id number]  Code Status: Full Code  Admission Date: 1/14/2023  Hospital Length of Stay: 0 days  Attending Physician: Saji Medina  Primary Care Provider: Primary Doctor No    Patient information was obtained from patient, parent, past medical records and ER records.     Inpatient consult to General surgery  Consult performed by: Sammie Mckeon MD  Consult ordered by: Zeeshan Lane PA-C  Reason for consult: possible cholecystitis        Subjective:     Principal Problem: Right upper quadrant abdominal pain    History of Present Illness: Brooke Moss is a 47 y.o. female presenting with RUQ pain on 1/14/22. Labwork significant for Tbili 6.1 and LFTs of 895/1212. No leukocytosis, but slight left shift of 76%. US demonstrated GB wall thickening from adenomyosis and an abundance of gallstones and sludge, however there was no visible intra or extrahepatic biliary ductal dilation. Follow up MRCP was performed to rule out choledocholithiasis, but no biliary obstruction was seen (no CBD dilation, no common duct stone, no Mirizzi syndrome). General surgery consulted for evaluation of possible cholecystitis.     Pt states that she was in her usual state of health until Friday. She had epigastric pain, that migrated to her RUQ and radiated through to her back. Several episodes of NBNB emesis, dehydrated upon presentation. Notes cola-colored urine today. Last BM this AM, normal in color and consistency. ED concerned for jaundice, but patient and her mother state that this is her normal complexion (she likes to tan outside). No scleral icterus nor yellowing of her mucosal membranes.     PSH: no prior abdominal surgeries. Tonillectomy as a child, did well with anesthesia.  Substance: socially drinks about twice a month (2-6 beers on those days)  Social: [place]. 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       No current facility-administered medications on file prior to encounter.     Current Outpatient Medications on File Prior to Encounter   Medication Sig    azelastine (ASTELIN) 137 mcg (0.1 %) nasal spray 2 sprays (274 mcg total) by Nasal route 2 (two) times daily.    methocarbamoL (ROBAXIN) 500 MG Tab Take 1 tablet (500 mg total) by mouth nightly as needed (back pain).    mupirocin (BACTROBAN) 2 % ointment Apply topically 4 (four) times daily. (Patient not taking: No sig reported)    mupirocin (BACTROBAN) 2 % ointment Apply to affected area 3 times daily (Patient not taking: No sig reported)       Review of patient's allergies indicates:   Allergen Reactions    Iodine and iodide containing products     Shellfish containing products        History reviewed. No pertinent past medical history.  Past Surgical History:   Procedure Laterality Date    INCISION AND DRAINAGE, PILONIDAL CYST, SIMPLE  02/19/2021     Family History    None       Tobacco Use    Smoking status: Every Day     Types: Vaping with nicotine, Cigarettes    Smokeless tobacco: Never   Substance and Sexual Activity    Alcohol use: No    Drug use: No    Sexual activity: Not Currently     Review of Systems   Constitutional:  Positive for chills. Negative for activity change and fever.   Respiratory:  Negative for chest tightness, shortness of breath and wheezing.    Cardiovascular:  Negative for chest pain and palpitations.   Gastrointestinal:  Positive for abdominal distention, abdominal pain, nausea and vomiting. Negative for constipation and diarrhea.   Genitourinary:  Negative for difficulty urinating.        Dark urine   Skin:  Negative for color change and wound.   Neurological:  Negative for light-headedness.   Hematological:  Does not bruise/bleed easily.   Objective:     Vital Signs (Most Recent):  Temp: 97.8 °F (36.6 °C) (01/14/23 1913)  Pulse: 63 (01/14/23 1913)  Resp: 16 (01/14/23 1913)  BP: 116/67 (01/14/23  1913)  SpO2: 96 % (01/14/23 1913) Vital Signs (24h Range):  Temp:  [97.8 °F (36.6 °C)-98.5 °F (36.9 °C)] 97.8 °F (36.6 °C)  Pulse:  [63-71] 63  Resp:  [16-24] 16  SpO2:  [96 %-98 %] 96 %  BP: (116-181)/() 116/67     Weight: 117.9 kg (260 lb)  Body mass index is 40.72 kg/m².    Physical Exam  Constitutional:       General: She is not in acute distress.     Appearance: She is well-developed. She is obese. She is not ill-appearing.   Neck:      Vascular: No JVD.      Trachea: No tracheal deviation.   Cardiovascular:      Rate and Rhythm: Normal rate and regular rhythm.   Pulmonary:      Effort: No respiratory distress.      Breath sounds: Normal breath sounds.   Abdominal:      Palpations: Abdomen is soft.      Comments: Abdomen is soft and non-distended. No surgical scars. Minimally tender in the RUQ and epigastrium, but had recently received pain medication. Not peritoneal.    Skin:     General: Skin is warm and dry.   Neurological:      Mental Status: She is alert and oriented to person, place, and time.       Significant Labs:  I have reviewed all pertinent lab results within the past 24 hours.  CBC:   Recent Labs   Lab 01/14/23  1247   WBC 7.21   RBC 4.92   HGB 14.3   HCT 43.1      MCV 88   MCH 29.1   MCHC 33.2     CMP:   Recent Labs   Lab 01/14/23  1247      CALCIUM 9.6   ALBUMIN 3.3*   PROT 7.7   *   K 3.6   CO2 20*      BUN 8   CREATININE 0.6   ALKPHOS 160*   ALT 1,212*   *   BILITOT 6.1*       Significant Diagnostics:  I have reviewed all pertinent imaging results/findings within the past 24 hours.      Assessment/Plan:     * Right upper quadrant abdominal pain  Brooke Moss is a 47 y.o. female with acute onset RUQ pain, N/V. Tbili 6.1 and LFTs 800/1200 on admit. US shows GB adenomyosis and stones/sludge, MRCP does not demonstrate any ductal obstruction. No imaging evidence to support choledocholithiasis at the current time.  Hepatitis panel negative. Serum tylenol  level normal.     Possible that she had an obstructing CBD stone that passed prior to imaging. However, other medical causes of her hyperbilirubinemia still need to be evaluated.     If LFTs/bili trend down, would likely plan for lap jessica on Monday vs Tuesday, with the presumptive diagnosis of resolved choledocholithiasis. Patient understands the need for medical workup, and is amenable to lap jessica if appropriate.       VTE Risk Mitigation (From admission, onward)         Ordered     IP VTE HIGH RISK PATIENT  Once         01/14/23 1642     Place sequential compression device  Until discontinued         01/14/23 1642                Thank you for your consult. I will follow-up with patient. Please contact us if you have any additional questions.    Sammei Mckeon MD  General Surgery  [facility]

## 2023-01-15 NOTE — PROGRESS NOTES
[facility]  Gastroenterology  Progress Note    Patient Name: Brooke Moss  MRN: [id number]  Admission Date: 1/14/2023  Hospital Length of Stay: 1 days    Subjective:         Interval History:  Minimal RUQ pain but did not eat overnight. Asking to advance diet this morning.    No current facility-administered medications on file prior to encounter.     Current Outpatient Medications on File Prior to Encounter   Medication Sig Dispense Refill    azelastine (ASTELIN) 137 mcg (0.1 %) nasal spray 2 sprays (274 mcg total) by Nasal route 2 (two) times daily. 30 mL 0    methocarbamoL (ROBAXIN) 500 MG Tab Take 1 tablet (500 mg total) by mouth nightly as needed (back pain). 15 tablet 0    mupirocin (BACTROBAN) 2 % ointment Apply topically 4 (four) times daily. (Patient not taking: No sig reported) 1 Tube 0    mupirocin (BACTROBAN) 2 % ointment Apply to affected area 3 times daily (Patient not taking: No sig reported) 22 g 0       Review of patient's allergies indicates:   Allergen Reactions    Iodine and iodide containing products     Shellfish containing products          Objective:     Vitals:    01/15/23 1613   BP:    Pulse:    Resp:    Temp: 98 °F (36.7 °C)         Constitutional:  obese, not in acute distress and well developed  HENT: Head: Normal, normocephalic, atraumatic.  Eyes: conjunctiva clear and sclera nonicteric  Cardiovascular: regular rate and rhythm and no murmur  Respiratory: normal chest expansion & respiratory effort   and no accessory muscle use  GI: soft, RUQ tender without rebound or guarding, without masses or organomegaly  Musculoskeletal: no muscular tenderness noted  Skin: normal color  Neurological: alert, oriented x3  Psychiatric: mood and affect are within normal limits, pt is a good historian; no memory problems were noted      Significant Labs:  Recent Labs   Lab 01/14/23  1247 01/15/23  0242   HGB 14.3 13.0       Lab Results   Component Value Date    WBC 7.03 01/15/2023     HGB 13.0 01/15/2023    HCT 41.6 01/15/2023    MCV 91 01/15/2023     01/15/2023       Lab Results   Component Value Date     (L) 01/15/2023    K 3.5 01/15/2023     01/15/2023    CO2 22 (L) 01/15/2023    BUN 7 01/15/2023    CREATININE 0.7 01/15/2023    CALCIUM 9.1 01/15/2023    ANIONGAP 9 01/15/2023    ESTGFRAFRICA >60 09/30/2017    EGFRNONAA >60 09/30/2017       Lab Results   Component Value Date    ALT 1,066 (H) 01/15/2023     (H) 01/15/2023    ALKPHOS 162 (H) 01/15/2023    BILITOT 6.3 (H) 01/15/2023       Lab Results   Component Value Date    INR 1.1 01/14/2023       Significant Imaging:  Reviewed pertinent radiology findings.       Assessment/Plan:     Brooke Moss is a 47 y.o. female with no significant PMH who presents with acute RUQ pain with hyperbilirubinemia and significant elevation in transaminases, US showing cholelithiasis and and borderline GB wall thickening with positive sonographic Shell sign. Acute hepatitis panel negative. Common bile duct within normal limits, no definitive obstruction seen on US and MRCP but noted mild dilation of CBD on MRCP. Transaminases downtrending but still with significant hyperbilirubinemia. May represent passed stone.     Problem List:  Symptomatic cholelithiasis vs mild acute cholecystitis  Elevated LFTs     Recommendations:  - NPO at midnight for possible EUS +/- ERCP 1/16 if bilirubin not downtrending  - Continue IV zosyn  - Appreciate gen surg recs, planning cholecystectomy in coming days    Thank you for involving us in the care of Brooke Moss. Please call with any additional questions, concerns or changes in the patient's clinical status. We will continue to follow.     Tab Quiroga MD  Gastroenterology Fellow PGY IV   [facility]

## 2023-01-15 NOTE — ASSESSMENT & PLAN NOTE
Brooke Moss is a 47 y.o. female with acute onset RUQ pain, N/V. Tbili 6.1 and LFTs 800/1200 on admit. US shows GB adenomyosis and stones/sludge, MRCP does not demonstrate any ductal obstruction. No imaging evidence to support choledocholithiasis at the current time.  Hepatitis panel negative. Serum tylenol level normal.     Possible that she had an obstructing CBD stone that passed prior to imaging. However, other medical causes of her hyperbilirubinemia still need to be evaluated.     If LFTs/bili trend down, would likely plan for lap jessica on Monday vs Tuesday, with the presumptive diagnosis of resolved choledocholithiasis. Patient understands the need for medical workup, and is amenable to lap jessica if appropriate.

## 2023-01-15 NOTE — SUBJECTIVE & OBJECTIVE
Interval History: NAEON, AF, VSS. Patient refers pain has improved from yesterday. Denies nausea/vomiting. No complaints at the time.      Medications:  Continuous Infusions:   lactated ringers 100 mL/hr at 01/15/23 0538     Scheduled Meds:  PRN Meds:acetaminophen, dextrose 10%, dextrose 10%, glucagon (human recombinant), glucose, glucose, morphine, naloxone, ondansetron, sodium chloride 0.9%     Review of patient's allergies indicates:   Allergen Reactions    Iodine and iodide containing products     Shellfish containing products      Objective:     Vital Signs (Most Recent):  Temp: 97.4 °F (36.3 °C) (01/15/23 0710)  Pulse: 73 (01/15/23 0710)  Resp: 16 (01/15/23 0710)  BP: 114/77 (01/15/23 0710)  SpO2: 96 % (01/15/23 0710) Vital Signs (24h Range):  Temp:  [97 °F (36.1 °C)-98.5 °F (36.9 °C)] 97.4 °F (36.3 °C)  Pulse:  [63-78] 73  Resp:  [16-24] 16  SpO2:  [95 %-98 %] 96 %  BP: (105-181)/() 114/77     Weight: 117.9 kg (260 lb)  Body mass index is 40.72 kg/m².    Intake/Output - Last 3 Shifts         01/13 0700  01/14 0659 01/14 0700  01/15 0659 01/15 0700  01/16 0659    Urine (mL/kg/hr)  0     Emesis/NG output  200     Total Output  200     Net  -200            Urine Occurrence  2 x     Stool Occurrence   0 x            Physical Exam  Constitutional:       General: She is not in acute distress.     Appearance: She is well-developed. She is obese. She is not ill-appearing.   Neck:      Vascular: No JVD.      Trachea: No tracheal deviation.   Cardiovascular:      Rate and Rhythm: Normal rate and regular rhythm.   Pulmonary:      Effort: No respiratory distress.      Breath sounds: Normal breath sounds.   Abdominal:      Palpations: Abdomen is soft.      Comments: Abdomen is soft and non-distended. No surgical scars. Minimally tender in the RUQ and epigastrium. Not peritoneal.    Skin:     General: Skin is warm and dry.   Neurological:      Mental Status: She is alert and oriented to person, place, and time.        Significant Labs:  I have reviewed all pertinent lab results within the past 24 hours.  CBC:   Recent Labs   Lab 01/15/23  0242   WBC 7.03   RBC 4.59   HGB 13.0   HCT 41.6      MCV 91   MCH 28.3   MCHC 31.3*     CMP:   Recent Labs   Lab 01/15/23  0242   GLU 91   CALCIUM 9.1   ALBUMIN 3.0*   PROT 6.7   *   K 3.5   CO2 22*      BUN 7   CREATININE 0.7   ALKPHOS 162*   ALT 1,066*   *   BILITOT 6.3*       Significant Diagnostics:  I have reviewed all pertinent imaging results/findings within the past 24 hours.

## 2023-01-15 NOTE — SUBJECTIVE & OBJECTIVE
Interval History: Pt reports pain has improved, has not needed further morphine prn since yesterday. Tolerating clears.  AST/ALT trended down    Review of Systems  Objective:     Vital Signs (Most Recent):  Temp: 98.2 °F (36.8 °C) (01/15/23 1130)  Pulse: 64 (01/15/23 1130)  Resp: 20 (01/15/23 1130)  BP: 128/70 (01/15/23 1130)  SpO2: 96 % (01/15/23 1130) Vital Signs (24h Range):  Temp:  [97 °F (36.1 °C)-98.4 °F (36.9 °C)] 98.2 °F (36.8 °C)  Pulse:  [63-78] 64  Resp:  [16-20] 20  SpO2:  [95 %-97 %] 96 %  BP: (105-128)/(54-81) 128/70     Weight: 117.9 kg (260 lb)  Body mass index is 40.72 kg/m².    Intake/Output Summary (Last 24 hours) at 1/15/2023 1322  Last data filed at 1/14/2023 1913  Gross per 24 hour   Intake --   Output 200 ml   Net -200 ml      Physical Exam  Constitutional:       General: She is not in acute distress.     Appearance: Normal appearance. She is obese. She is not toxic-appearing or diaphoretic.   HENT:      Head: Normocephalic and atraumatic.   Cardiovascular:      Rate and Rhythm: Normal rate and regular rhythm.      Heart sounds: No murmur heard.    No friction rub. No gallop.   Pulmonary:      Effort: Pulmonary effort is normal. No respiratory distress.      Breath sounds: Normal breath sounds. No wheezing or rales.   Abdominal:      General: Abdomen is flat. There is no distension.      Palpations: Abdomen is soft.      Tenderness: There is abdominal tenderness. There is no guarding or rebound.   Musculoskeletal:      Right lower leg: No edema.      Left lower leg: No edema.   Skin:     General: Skin is warm and dry.   Neurological:      General: No focal deficit present.      Mental Status: She is alert and oriented to person, place, and time.       MELD-Na score: 16 at 1/15/2023  2:42 AM  MELD score: 14 at 1/15/2023  2:42 AM  Calculated from:  Serum Creatinine: 0.7 mg/dL (Using min of 1 mg/dL) at 1/15/2023  2:42 AM  Serum Sodium: 135 mmol/L at 1/15/2023  2:42 AM  Total Bilirubin: 6.3 mg/dL  at 1/15/2023  2:42 AM  INR(ratio): 1.1 at 1/14/2023  3:07 PM  Age: 47 years    Significant Labs:  CBC:  Recent Labs   Lab 01/14/23  1247 01/15/23  0242   WBC 7.21 7.03   HGB 14.3 13.0   HCT 43.1 41.6    235     CMP:  Recent Labs   Lab 01/14/23  1247 01/15/23  0242   * 135*   K 3.6 3.5    104   CO2 20* 22*    91   BUN 8 7   CREATININE 0.6 0.7   CALCIUM 9.6 9.1   PROT 7.7 6.7   ALBUMIN 3.3* 3.0*   BILITOT 6.1* 6.3*   ALKPHOS 160* 162*   * 601*   ALT 1,212* 1,066*   ANIONGAP 12 9     PTINR:  Recent Labs   Lab 01/14/23  1507   INR 1.1       Significant Procedures:   Dobutamine Stress Test with Color Flow: No results found for this or any previous visit.

## 2023-01-15 NOTE — SUBJECTIVE & OBJECTIVE
No current facility-administered medications on file prior to encounter.     Current Outpatient Medications on File Prior to Encounter   Medication Sig    azelastine (ASTELIN) 137 mcg (0.1 %) nasal spray 2 sprays (274 mcg total) by Nasal route 2 (two) times daily.    methocarbamoL (ROBAXIN) 500 MG Tab Take 1 tablet (500 mg total) by mouth nightly as needed (back pain).    mupirocin (BACTROBAN) 2 % ointment Apply topically 4 (four) times daily. (Patient not taking: No sig reported)    mupirocin (BACTROBAN) 2 % ointment Apply to affected area 3 times daily (Patient not taking: No sig reported)       Review of patient's allergies indicates:   Allergen Reactions    Iodine and iodide containing products     Shellfish containing products        History reviewed. No pertinent past medical history.  Past Surgical History:   Procedure Laterality Date    INCISION AND DRAINAGE, PILONIDAL CYST, SIMPLE  02/19/2021     Family History    None       Tobacco Use    Smoking status: Every Day     Types: Vaping with nicotine, Cigarettes    Smokeless tobacco: Never   Substance and Sexual Activity    Alcohol use: No    Drug use: No    Sexual activity: Not Currently     Review of Systems   Constitutional:  Positive for chills. Negative for activity change and fever.   Respiratory:  Negative for chest tightness, shortness of breath and wheezing.    Cardiovascular:  Negative for chest pain and palpitations.   Gastrointestinal:  Positive for abdominal distention, abdominal pain, nausea and vomiting. Negative for constipation and diarrhea.   Genitourinary:  Negative for difficulty urinating.        Dark urine   Skin:  Negative for color change and wound.   Neurological:  Negative for light-headedness.   Hematological:  Does not bruise/bleed easily.   Objective:     Vital Signs (Most Recent):  Temp: 97.8 °F (36.6 °C) (01/14/23 1913)  Pulse: 63 (01/14/23 1913)  Resp: 16 (01/14/23 1913)  BP: 116/67 (01/14/23 1913)  SpO2: 96 % (01/14/23 1913)  Vital Signs (24h Range):  Temp:  [97.8 °F (36.6 °C)-98.5 °F (36.9 °C)] 97.8 °F (36.6 °C)  Pulse:  [63-71] 63  Resp:  [16-24] 16  SpO2:  [96 %-98 %] 96 %  BP: (116-181)/() 116/67     Weight: 117.9 kg (260 lb)  Body mass index is 40.72 kg/m².    Physical Exam  Constitutional:       General: She is not in acute distress.     Appearance: She is well-developed. She is obese. She is not ill-appearing.   Neck:      Vascular: No JVD.      Trachea: No tracheal deviation.   Cardiovascular:      Rate and Rhythm: Normal rate and regular rhythm.   Pulmonary:      Effort: No respiratory distress.      Breath sounds: Normal breath sounds.   Abdominal:      Palpations: Abdomen is soft.      Comments: Abdomen is soft and non-distended. No surgical scars. Minimally tender in the RUQ and epigastrium, but had recently received pain medication. Not peritoneal.    Skin:     General: Skin is warm and dry.   Neurological:      Mental Status: She is alert and oriented to person, place, and time.       Significant Labs:  I have reviewed all pertinent lab results within the past 24 hours.  CBC:   Recent Labs   Lab 01/14/23  1247   WBC 7.21   RBC 4.92   HGB 14.3   HCT 43.1      MCV 88   MCH 29.1   MCHC 33.2     CMP:   Recent Labs   Lab 01/14/23  1247      CALCIUM 9.6   ALBUMIN 3.3*   PROT 7.7   *   K 3.6   CO2 20*      BUN 8   CREATININE 0.6   ALKPHOS 160*   ALT 1,212*   *   BILITOT 6.1*       Significant Diagnostics:  I have reviewed all pertinent imaging results/findings within the past 24 hours.

## 2023-01-15 NOTE — PLAN OF CARE
POC reviewed with patient who verbalized understanding. VSS on RA. AAOX4. Remains free of falls and injury. Patient up independently in room    - tolerating diet, denies nausea. NPO at midnight  - minimal discomfort to RUQ w/o need for PRN medications  - mIVF    Patient denies chest pain & SOB. No acute events. No distress noted. Bed in lowest position, call light within reach, frequent rounds made for safety.     WCTM     Problem: Adult Inpatient Plan of Care  Goal: Plan of Care Review  Outcome: Ongoing, Progressing  Goal: Patient-Specific Goal (Individualized)  Outcome: Ongoing, Progressing  Goal: Absence of Hospital-Acquired Illness or Injury  Outcome: Ongoing, Progressing  Goal: Optimal Comfort and Wellbeing  Outcome: Ongoing, Progressing

## 2023-01-15 NOTE — HOSPITAL COURSE
Ms. Moss was admitted to Hospital Medicine for evaluation of abdominal pain associated with elevated liver transaminases. Ultrasound with cholelithiasis and gallbladder sludge with upper limits of normal wall thickness and reportedly positive sonographic Shell sign concerning for acute cholecystitis, although no pericholecystic fluid or wall hyperemia at this time.  There is suspected superimposed gallbladder adenomyomatosis. General Surgery and [facility] were consulted. MRCP was performed, which showed cholelithiasis, biliary sludge, and gallbladder adenomyomatosis.  No evidence of choledocholithiasis. Mild dilatation of the extra hepatic common bile duct, similar to findings on recent ultrasound. EUS/ERCP was performed by [facility] on 1/17 with a distal CBD stone causing an obstruction, complete removal was accomplished by biliary sphincterotomy and balloon extraction, the biliary tree was swept several times with clearance of the duct achieved, significant amount of sludge and all stones were removed. LFTs improved post-procedurally. General Surgery performed a laparoscopic cholecystectomy on 1/18.     She will follow up in approximately 2 weeks in General Surgery clinic. She does not have a Primary Care provider, so a referral to Internal Medicine was provided. She was given verbal and written instructions on post-op wound care, return precautions, and follow up plans. Her mother was present at the bedside. All questions answered.     Patient and family provided with the [facility] brochure with my business card. Brochure & philosophy of care reviewed, including the [facility] values, the scope of Hospital Medicine, the role of Advance Practice Providers, and the role Residents/[place] at our large academic [facility]. Feedback requested regarding quality of the hospital stay so far and any issues that I can address.  Discharged to home in good condition.

## 2023-01-15 NOTE — ASSESSMENT & PLAN NOTE
Brooke Moss is a 47 y.o. female with acute onset RUQ pain, N/V. Tbili 6.1 and LFTs 800/1200 on admit. US shows GB adenomyosis and stones/sludge, MRCP does not demonstrate any ductal obstruction. No imaging evidence to support choledocholithiasis at the current time.  Hepatitis panel negative. Serum tylenol level normal.   Possible that she had an obstructing CBD stone that passed prior to imaging. However, other medical causes of her hyperbilirubinemia still need to be evaluated.     - If LFTs/bili trend down, would likely plan for lap jessica on Tuesday (tentatively), with the presumptive diagnosis of resolved choledocholithiasis. Patient understands the need for medical workup, and is amenable to lap jessica if appropriate.   - Ok for diet until day of surgery  - Rest of care per primary  - Please feel free to contact General Surgery team if any questions, concerns, or changes in clinical status.

## 2023-01-16 LAB
ALBUMIN SERPL BCP-MCNC: 2.9 G/DL (ref 3.5–5.2)
ALP SERPL-CCNC: 161 U/L (ref 55–135)
ALT SERPL W/O P-5'-P-CCNC: 865 U/L (ref 10–44)
ANION GAP SERPL CALC-SCNC: 9 MMOL/L (ref 8–16)
AST SERPL-CCNC: 329 U/L (ref 10–40)
BASOPHILS # BLD AUTO: 0.04 K/UL (ref 0–0.2)
BASOPHILS NFR BLD: 0.7 % (ref 0–1.9)
BILIRUB SERPL-MCNC: 5.4 MG/DL (ref 0.1–1)
BUN SERPL-MCNC: 4 MG/DL (ref 6–20)
CALCIUM SERPL-MCNC: 9.1 MG/DL (ref 8.7–10.5)
CHLORIDE SERPL-SCNC: 103 MMOL/L (ref 95–110)
CO2 SERPL-SCNC: 20 MMOL/L (ref 23–29)
CREAT SERPL-MCNC: 0.7 MG/DL (ref 0.5–1.4)
DIFFERENTIAL METHOD: ABNORMAL
EOSINOPHIL # BLD AUTO: 0.3 K/UL (ref 0–0.5)
EOSINOPHIL NFR BLD: 5 % (ref 0–8)
ERYTHROCYTE [DISTWIDTH] IN BLOOD BY AUTOMATED COUNT: 13.9 % (ref 11.5–14.5)
EST. GFR  (NO RACE VARIABLE): >60 ML/MIN/1.73 M^2
GLUCOSE SERPL-MCNC: 93 MG/DL (ref 70–110)
HCT VFR BLD AUTO: 39.8 % (ref 37–48.5)
HGB BLD-MCNC: 12.7 G/DL (ref 12–16)
IMM GRANULOCYTES # BLD AUTO: 0.02 K/UL (ref 0–0.04)
IMM GRANULOCYTES NFR BLD AUTO: 0.4 % (ref 0–0.5)
LYMPHOCYTES # BLD AUTO: 1.2 K/UL (ref 1–4.8)
LYMPHOCYTES NFR BLD: 21.1 % (ref 18–48)
MCH RBC QN AUTO: 28.7 PG (ref 27–31)
MCHC RBC AUTO-ENTMCNC: 31.9 G/DL (ref 32–36)
MCV RBC AUTO: 90 FL (ref 82–98)
MONOCYTES # BLD AUTO: 0.6 K/UL (ref 0.3–1)
MONOCYTES NFR BLD: 11.3 % (ref 4–15)
NEUTROPHILS # BLD AUTO: 3.4 K/UL (ref 1.8–7.7)
NEUTROPHILS NFR BLD: 61.5 % (ref 38–73)
NRBC BLD-RTO: 0 /100 WBC
PLATELET # BLD AUTO: 232 K/UL (ref 150–450)
PMV BLD AUTO: 10.6 FL (ref 9.2–12.9)
POTASSIUM SERPL-SCNC: 3.8 MMOL/L (ref 3.5–5.1)
PROT SERPL-MCNC: 6.6 G/DL (ref 6–8.4)
RBC # BLD AUTO: 4.42 M/UL (ref 4–5.4)
SODIUM SERPL-SCNC: 132 MMOL/L (ref 136–145)
WBC # BLD AUTO: 5.59 K/UL (ref 3.9–12.7)

## 2023-01-16 PROCEDURE — 99233 SBSQ HOSP IP/OBS HIGH 50: CPT | Mod: ,,, | Performed by: STUDENT IN AN ORGANIZED HEALTH CARE EDUCATION/TRAINING PROGRAM

## 2023-01-16 PROCEDURE — 63600175 PHARM REV CODE 636 W HCPCS: Performed by: STUDENT IN AN ORGANIZED HEALTH CARE EDUCATION/TRAINING PROGRAM

## 2023-01-16 PROCEDURE — 80053 COMPREHEN METABOLIC PANEL: CPT | Performed by: STUDENT IN AN ORGANIZED HEALTH CARE EDUCATION/TRAINING PROGRAM

## 2023-01-16 PROCEDURE — 20600001 HC STEP DOWN PRIVATE ROOM

## 2023-01-16 PROCEDURE — 99232 PR SUBSEQUENT HOSPITAL CARE,LEVL II: ICD-10-PCS | Mod: ,,, | Performed by: STUDENT IN AN ORGANIZED HEALTH CARE EDUCATION/TRAINING PROGRAM

## 2023-01-16 PROCEDURE — 99233 PR SUBSEQUENT HOSPITAL CARE,LEVL III: ICD-10-PCS | Mod: ,,, | Performed by: STUDENT IN AN ORGANIZED HEALTH CARE EDUCATION/TRAINING PROGRAM

## 2023-01-16 PROCEDURE — 99232 SBSQ HOSP IP/OBS MODERATE 35: CPT | Mod: ,,, | Performed by: STUDENT IN AN ORGANIZED HEALTH CARE EDUCATION/TRAINING PROGRAM

## 2023-01-16 PROCEDURE — 85025 COMPLETE CBC W/AUTO DIFF WBC: CPT | Performed by: STUDENT IN AN ORGANIZED HEALTH CARE EDUCATION/TRAINING PROGRAM

## 2023-01-16 PROCEDURE — 36415 COLL VENOUS BLD VENIPUNCTURE: CPT | Performed by: STUDENT IN AN ORGANIZED HEALTH CARE EDUCATION/TRAINING PROGRAM

## 2023-01-16 RX ADMIN — SODIUM CHLORIDE, POTASSIUM CHLORIDE, SODIUM LACTATE AND CALCIUM CHLORIDE: 600; 310; 30; 20 INJECTION, SOLUTION INTRAVENOUS at 10:01

## 2023-01-16 RX ADMIN — ONDANSETRON 4 MG: 2 INJECTION INTRAMUSCULAR; INTRAVENOUS at 08:01

## 2023-01-16 RX ADMIN — MORPHINE SULFATE 4 MG: 4 INJECTION INTRAVENOUS at 08:01

## 2023-01-16 NOTE — TREATMENT PLAN
Brief GI Treatment Plan    Considered EUS +/- ERCP today 1/16 but discussed with surgery and given continued improvement in liver enzymes and bilirubin and clinicaly stability, will hold off today and continue to monitor LFTs. Surgery plans for OR 1/17 if still improving, can consider intraoperative cholangiogram at that point and if obstructing stone is present, could do ERCP at that point.    Tab Quiroga  Gastroenterology Fellow, PGY-IV

## 2023-01-16 NOTE — ASSESSMENT & PLAN NOTE
Brooke Moss is a 47 y.o. female with acute onset RUQ pain, N/V. Tbili 6.1 and LFTs 800/1200 on admit. US shows GB adenomyosis and stones/sludge, MRCP does not demonstrate any ductal obstruction. No imaging evidence to support choledocholithiasis at the current time.  Hepatitis panel negative. Serum tylenol level normal.   Possible that she had an obstructing CBD stone that passed prior to imaging. However, other medical causes of her hyperbilirubinemia still need to be evaluated.     - If LFTs/bili continue to trend down, would likely plan for lap jessica on Tomorrow (tentatively), with the presumptive diagnosis of resolved choledocholithiasis. Patient understands the need for medical workup, and is amenable to lap jessica if appropriate.   - Ok for diet until day of surgery, make NPO at midnight tonight in preparation for possible operative intervention tomorrow  - Rest of care per primary  - Please feel free to contact General Surgery team if any questions, concerns, or changes in clinical status.

## 2023-01-16 NOTE — SUBJECTIVE & OBJECTIVE
Interval History: NAEON, AF, VSS. Abdominal pain better today.  Tolerating some to eat.  LFTs downtrending today and t. Bili down today.    Medications:  Continuous Infusions:   lactated ringers 100 mL/hr at 01/15/23 1519     Scheduled Meds:  PRN Meds:acetaminophen, dextrose 10%, dextrose 10%, glucagon (human recombinant), glucose, glucose, morphine, naloxone, ondansetron, sodium chloride 0.9%     Review of patient's allergies indicates:   Allergen Reactions    Iodine and iodide containing products     Shellfish containing products      Objective:     Vital Signs (Most Recent):  Temp: 97.2 °F (36.2 °C) (01/16/23 0822)  Pulse: 70 (01/16/23 0822)  Resp: 18 (01/16/23 0822)  BP: 113/73 (01/16/23 0822)  SpO2: 96 % (01/16/23 0822) Vital Signs (24h Range):  Temp:  [97.2 °F (36.2 °C)-98.4 °F (36.9 °C)] 97.2 °F (36.2 °C)  Pulse:  [64-72] 70  Resp:  [18-20] 18  SpO2:  [94 %-97 %] 96 %  BP: (113-155)/(66-86) 113/73     Weight: 121.3 kg (267 lb 6.7 oz)  Body mass index is 41.88 kg/m².    Intake/Output - Last 3 Shifts         01/14 0700  01/15 0659 01/15 0700  01/16 0659 01/16 0700  01/17 0659    P.O.  840     I.V. (mL/kg)  3388.1 (27.9)     Total Intake(mL/kg)  4228.1 (34.9)     Urine (mL/kg/hr) 0      Emesis/NG output 200      Total Output 200      Net -200 +4228.1            Urine Occurrence 2 x 8 x     Stool Occurrence  0 x     Emesis Occurrence  0 x             Physical Exam  Constitutional:       General: She is not in acute distress.     Appearance: She is well-developed. She is obese. She is not ill-appearing.   Neck:      Vascular: No JVD.      Trachea: No tracheal deviation.   Cardiovascular:      Rate and Rhythm: Normal rate and regular rhythm.   Pulmonary:      Effort: No respiratory distress.      Breath sounds: Normal breath sounds.   Abdominal:      Palpations: Abdomen is soft.      Comments: Abdomen is soft and non-distended. No surgical scars. Minimally tender in the RUQ and epigastrium. Not peritoneal.     Skin:     General: Skin is warm and dry.   Neurological:      Mental Status: She is alert and oriented to person, place, and time.       Significant Labs:  I have reviewed all pertinent lab results within the past 24 hours.  CBC:   Recent Labs   Lab 01/16/23 0422   WBC 5.59   RBC 4.42   HGB 12.7   HCT 39.8      MCV 90   MCH 28.7   MCHC 31.9*     CMP:   Recent Labs   Lab 01/16/23 0422   GLU 93   CALCIUM 9.1   ALBUMIN 2.9*   PROT 6.6   *   K 3.8   CO2 20*      BUN 4*   CREATININE 0.7   ALKPHOS 161*   *   *   BILITOT 5.4*       Significant Diagnostics:  I have reviewed all pertinent imaging results/findings within the past 24 hours.

## 2023-01-16 NOTE — ASSESSMENT & PLAN NOTE
47 y.o. female who presents with RUQ abdominal pain, nausea and vomiting.  - afebrile and hemodynamically stable.  - Labs with Tb elevated 6.1, , ALT 1212, lipase wnl.   - no leukocytosis, afebrile  - Ultrasound with cholelithiasis and gallbladder sludge with upper limits of normal wall thickness and reportedly positive sonographic Shell sign concerning for acute cholecystitis, although no pericholecystic fluid or wall hyperemia at this time.  There is suspected superimposed gallbladder adenomyomatosis.   - Admitted to hospital medicine  - IVF, Pain control with morphine  - MRCP with cholelithiasis, biliary sludge, and gallbladder adenomyomatosis.  No evidence of choledocholithiasis. Mild dilatation of the extra hepatic common bile duct, similar to findings on recent ultrasound.   - General surgery and [facility] consulted.

## 2023-01-16 NOTE — PLAN OF CARE
POC is reviewed and understood by patient. Independent to toilet. No c/o pain. No sign of distress noted. NPO. HOB elevated. Bed in lowest position. Call bell in reach. WCTM.   Problem: Adult Inpatient Plan of Care  Goal: Plan of Care Review  Outcome: Ongoing, Progressing  Goal: Patient-Specific Goal (Individualized)  Outcome: Ongoing, Progressing  Goal: Absence of Hospital-Acquired Illness or Injury  Outcome: Ongoing, Progressing  Goal: Optimal Comfort and Wellbeing  Outcome: Ongoing, Progressing  Goal: Readiness for Transition of Care  Outcome: Ongoing, Progressing     Problem: Bariatric Environmental Safety  Goal: Safety Maintained with Care  Outcome: Ongoing, Progressing

## 2023-01-16 NOTE — PROGRESS NOTES
Patient complained of flushing, nausea, and increased pain after eating some of full liquid diet dinner. VSS on RA.   PRN zofran and morphine given with resolution of symptoms.  WCTM

## 2023-01-16 NOTE — PROGRESS NOTES
[facility] Medicine  Progress Note    Patient Name: Brooke Moss  MRN: [id number]  Patient Class: IP- Inpatient   Admission Date: 1/14/2023  Length of Stay: 2 days  Attending Physician: Saji Medina  Primary Care Provider: Primary Doctor No        Subjective:     Principal Problem:Right upper quadrant abdominal pain        HPI:  Brooke Moss is a 47 y.o. female who presents with abdominal pain. Patient states that pain woke her up from sleep on Friday and was RUQ and sharp, with radiations to back. She was diaphoretic and felt chills. It persisted throughout the day and then she started having nausea and vomiting. Reported vomiting ~12 times, unable to tolerate anything po. Did not see any blood. No changes in bowel habits. After vomiting so much, she started to have burning epigastric pain but the abdominal pain did improve a little bit, and she was able to sleep some Friday night. However, pain returned this morning and she presented to ER. She denies having had fever, chest pain, shortness of breath, urinary changes, lower extremity edema. She reports social EtOH use, with no recent increase in consumption, no changes in foods. She reports her sister recently had her gallbladder removed. Denies previous history of similar pain.       Overview/Hospital Course:  In the Ed, patient was afebrile and hemodynamically stable. Labs with Tb elevated 6.1, , ALT 1212, lipase wnl. Ultrasound with cholelithiasis and gallbladder sludge with upper limits of normal wall thickness and reportedly positive sonographic Shell sign concerning for acute cholecystitis, although no pericholecystic fluid or wall hyperemia at this time.  There is suspected superimposed gallbladder adenomyomatosis. General surgery and AES consulted. Patient was admitted to hospital medicine. MRCP with cholelithiasis, biliary sludge, and gallbladder adenomyomatosis.  No evidence of choledocholithiasis. Mild  dilatation of the extra hepatic common bile duct, similar to findings on recent ultrasound.       Interval History: Some RUQ pain with liquids yesterday. Afebrile  ERCP with AES today    Review of Systems  Objective:     Vital Signs (Most Recent):  Temp: 97.1 °F (36.2 °C) (01/16/23 1118)  Pulse: 66 (01/16/23 1118)  Resp: 20 (01/16/23 1118)  BP: 135/84 (01/16/23 1118)  SpO2: 97 % (01/16/23 1118) Vital Signs (24h Range):  Temp:  [97.1 °F (36.2 °C)-98.4 °F (36.9 °C)] 97.1 °F (36.2 °C)  Pulse:  [65-72] 66  Resp:  [18-20] 20  SpO2:  [94 %-97 %] 97 %  BP: (113-155)/(66-86) 135/84     Weight: 121.3 kg (267 lb 6.7 oz)  Body mass index is 41.88 kg/m².    Intake/Output Summary (Last 24 hours) at 1/16/2023 1340  Last data filed at 1/16/2023 0635  Gross per 24 hour   Intake 2183.85 ml   Output --   Net 2183.85 ml      Physical Exam  Constitutional:       General: She is not in acute distress.     Appearance: Normal appearance. She is obese. She is not toxic-appearing or diaphoretic.   HENT:      Head: Normocephalic and atraumatic.   Cardiovascular:      Rate and Rhythm: Normal rate and regular rhythm.      Heart sounds: No murmur heard.    No friction rub. No gallop.   Pulmonary:      Effort: Pulmonary effort is normal. No respiratory distress.      Breath sounds: Normal breath sounds. No wheezing or rales.   Abdominal:      General: Abdomen is flat. There is no distension.      Palpations: Abdomen is soft.      Tenderness: There is abdominal tenderness. There is no guarding or rebound.   Musculoskeletal:      Right lower leg: No edema.      Left lower leg: No edema.   Skin:     General: Skin is warm and dry.   Neurological:      General: No focal deficit present.      Mental Status: She is alert and oriented to person, place, and time.       MELD-Na score: 18 at 1/16/2023  4:22 AM  MELD score: 14 at 1/16/2023  4:22 AM  Calculated from:  Serum Creatinine: 0.7 mg/dL (Using min of 1 mg/dL) at 1/16/2023  4:22 AM  Serum Sodium: 132  mmol/L at 1/16/2023  4:22 AM  Total Bilirubin: 5.4 mg/dL at 1/16/2023  4:22 AM  INR(ratio): 1.1 at 1/14/2023  3:07 PM  Age: 47 years    Significant Labs:  CBC:  Recent Labs   Lab 01/15/23  0242 01/16/23  0422   WBC 7.03 5.59   HGB 13.0 12.7   HCT 41.6 39.8    232     CMP:  Recent Labs   Lab 01/15/23  0242 01/16/23  0422   * 132*   K 3.5 3.8    103   CO2 22* 20*   GLU 91 93   BUN 7 4*   CREATININE 0.7 0.7   CALCIUM 9.1 9.1   PROT 6.7 6.6   ALBUMIN 3.0* 2.9*   BILITOT 6.3* 5.4*   ALKPHOS 162* 161*   * 329*   ALT 1,066* 865*   ANIONGAP 9 9     PTINR:  Recent Labs   Lab 01/14/23  1507   INR 1.1       Significant Procedures:   Dobutamine Stress Test with Color Flow: No results found for this or any previous visit.      Assessment/Plan:      * Right upper quadrant abdominal pain  47 y.o. female who presents with RUQ abdominal pain, nausea and vomiting.  - afebrile and hemodynamically stable.  - Labs with Tb elevated 6.1, , ALT 1212, lipase wnl.   - no leukocytosis, afebrile  - Ultrasound with cholelithiasis and gallbladder sludge with upper limits of normal wall thickness and reportedly positive sonographic Shell sign concerning for acute cholecystitis, although no pericholecystic fluid or wall hyperemia at this time.  There is suspected superimposed gallbladder adenomyomatosis.   - Admitted to hospital medicine  - IVF, Pain control with morphine  - MRCP with cholelithiasis, biliary sludge, and gallbladder adenomyomatosis.  No evidence of choledocholithiasis. Mild dilatation of the extra hepatic common bile duct, similar to findings on recent ultrasound.   - General surgery and AES consulted.    VTE Risk Mitigation (From admission, onward)         Ordered     IP VTE HIGH RISK PATIENT  Once         01/14/23 1642     Place sequential compression device  Until discontinued         01/14/23 1642                Discharge Planning   NARAYAN: 1/18/2023     Code Status: Full Code   Is the patient  medically ready for discharge?:     Reason for patient still in hospital (select all that apply): Patient trending condition and Treatment      Discharge Delays: (!) Procedure Scheduling (IR, OR, Labs, Echo, Cath, Echo, EEG)              Saji Medina MD  Department of Hospital Medicine   [facility]

## 2023-01-16 NOTE — SUBJECTIVE & OBJECTIVE
Interval History: Some RUQ pain with liquids yesterday. Afebrile  ERCP with AES today    Review of Systems  Objective:     Vital Signs (Most Recent):  Temp: 97.1 °F (36.2 °C) (01/16/23 1118)  Pulse: 66 (01/16/23 1118)  Resp: 20 (01/16/23 1118)  BP: 135/84 (01/16/23 1118)  SpO2: 97 % (01/16/23 1118) Vital Signs (24h Range):  Temp:  [97.1 °F (36.2 °C)-98.4 °F (36.9 °C)] 97.1 °F (36.2 °C)  Pulse:  [65-72] 66  Resp:  [18-20] 20  SpO2:  [94 %-97 %] 97 %  BP: (113-155)/(66-86) 135/84     Weight: 121.3 kg (267 lb 6.7 oz)  Body mass index is 41.88 kg/m².    Intake/Output Summary (Last 24 hours) at 1/16/2023 1340  Last data filed at 1/16/2023 0635  Gross per 24 hour   Intake 2183.85 ml   Output --   Net 2183.85 ml      Physical Exam  Constitutional:       General: She is not in acute distress.     Appearance: Normal appearance. She is obese. She is not toxic-appearing or diaphoretic.   HENT:      Head: Normocephalic and atraumatic.   Cardiovascular:      Rate and Rhythm: Normal rate and regular rhythm.      Heart sounds: No murmur heard.    No friction rub. No gallop.   Pulmonary:      Effort: Pulmonary effort is normal. No respiratory distress.      Breath sounds: Normal breath sounds. No wheezing or rales.   Abdominal:      General: Abdomen is flat. There is no distension.      Palpations: Abdomen is soft.      Tenderness: There is abdominal tenderness. There is no guarding or rebound.   Musculoskeletal:      Right lower leg: No edema.      Left lower leg: No edema.   Skin:     General: Skin is warm and dry.   Neurological:      General: No focal deficit present.      Mental Status: She is alert and oriented to person, place, and time.       MELD-Na score: 18 at 1/16/2023  4:22 AM  MELD score: 14 at 1/16/2023  4:22 AM  Calculated from:  Serum Creatinine: 0.7 mg/dL (Using min of 1 mg/dL) at 1/16/2023  4:22 AM  Serum Sodium: 132 mmol/L at 1/16/2023  4:22 AM  Total Bilirubin: 5.4 mg/dL at 1/16/2023  4:22 AM  INR(ratio): 1.1  at 1/14/2023  3:07 PM  Age: 47 years    Significant Labs:  CBC:  Recent Labs   Lab 01/15/23  0242 01/16/23  0422   WBC 7.03 5.59   HGB 13.0 12.7   HCT 41.6 39.8    232     CMP:  Recent Labs   Lab 01/15/23  0242 01/16/23  0422   * 132*   K 3.5 3.8    103   CO2 22* 20*   GLU 91 93   BUN 7 4*   CREATININE 0.7 0.7   CALCIUM 9.1 9.1   PROT 6.7 6.6   ALBUMIN 3.0* 2.9*   BILITOT 6.3* 5.4*   ALKPHOS 162* 161*   * 329*   ALT 1,066* 865*   ANIONGAP 9 9     PTINR:  Recent Labs   Lab 01/14/23  1507   INR 1.1       Significant Procedures:   Dobutamine Stress Test with Color Flow: No results found for this or any previous visit.

## 2023-01-16 NOTE — PLAN OF CARE
End of Shift:    Patient in chair, ambulates independently.  PRN pain medication given x1. See MAR for details.  AAOx4. RA. No tele. Patient states good urine output, no BM today.  Clear liquid diet. No complaints or needs at this time.    Bed locked and in lowest position with side rails up x2. Call light and bedside table within reach.    Will continue plan of care until handoff      Problem: Adult Inpatient Plan of Care  Goal: Plan of Care Review  Outcome: Ongoing, Progressing  Goal: Patient-Specific Goal (Individualized)  Outcome: Ongoing, Progressing  Goal: Absence of Hospital-Acquired Illness or Injury  Outcome: Ongoing, Progressing  Goal: Optimal Comfort and Wellbeing  Outcome: Ongoing, Progressing  Goal: Readiness for Transition of Care  Outcome: Ongoing, Progressing     Problem: Bariatric Environmental Safety  Goal: Safety Maintained with Care  Outcome: Ongoing, Progressing

## 2023-01-16 NOTE — PLAN OF CARE
[facility]  Initial Discharge Assessment       Primary Care Provider: Primary Doctor No    Admission Diagnosis: Calculus of gallbladder with acute cholecystitis and obstruction [K80.01]  RUQ abdominal pain [R10.11]  Biliary obstruction [K83.1]  Chest pain [R07.9]    Admission Date: 1/14/2023  Expected Discharge Date: 1/18/2023    Discharge Barriers Identified: None    Payor: [employer] / Plan: [facility] SELECT PLUS / Product Type: Commercial /     Extended Emergency Contact Information  Primary Emergency Contact: Nicole Moss   [place]  Mobile Phone: [phone]  Relation: Sister    Discharge Plan A: Home  Discharge Plan B: Home with family      [id number] - [place] [id number] [facility] AT [address] [place] [address]  [address] [address]  Phone: [phone] Fax: [phone]    [id number] - [place], [place] - [facility] AT [facility] [address]  [facility]  [place] [address]  Phone: [phone] Fax: [phone]    SW met with patient at bedside to complete discharge planning assessment.  Patient alert and oriented xs 4.  Patient verified all demographic information on facesheet is correct.  Patient verified she has NO PCP.  Patient verified primary health insurance is [facility] Select Plus.  Patient with NO home health or DME.  Patient with NO POA or Living Will.  Patient not on dialysis or medication coumadin.  Patient with no 30 day admission.  Patient with no financial issues at this time.  Patient family will provide transportation upon discharge from facility.  Patient independent with ADLs, live with sister, drives self.      Initial Assessment (most recent)       Adult Discharge Assessment - 01/16/23 1600          Discharge Assessment    Assessment Type Discharge Planning Assessment     Confirmed/corrected address, phone number and insurance Yes     Confirmed Demographics Correct on Facesheet      Source of Information patient     Communicated NARAYAN with patient/caregiver Date not available/Unable to determine     People in Home sibling(s)     Facility Arrived From: home     Do you expect to return to your current living situation? Yes     Do you have help at home or someone to help you manage your care at home? Yes     Who are your caregiver(s) and their phone number(s)? sister     Prior to hospitilization cognitive status: Alert/Oriented     Current cognitive status: Alert/Oriented     Equipment Currently Used at Home none     Readmission within 30 days? No     Patient currently being followed by outpatient case management? No     Do you currently have service(s) that help you manage your care at home? No     Do you take prescription medications? Yes     Do you have prescription coverage? Yes     Do you have any problems affording any of your prescribed medications? No     Is the patient taking medications as prescribed? yes     Who is going to help you get home at discharge? sister     How do you get to doctors appointments? car, drives self     Are you on dialysis? No     Do you take coumadin? No     Discharge Plan A Home     Discharge Plan B Home with family     DME Needed Upon Discharge  none     Discharge Plan discussed with: Patient     Discharge Barriers Identified None

## 2023-01-16 NOTE — PROGRESS NOTES
[facility]  General Surgery  Progress Note    Subjective:     History of Present Illness:  Brooke Moss is a 47 y.o. female presenting with RUQ pain on 1/14/22. Labwork significant for Tbili 6.1 and LFTs of 895/1212. No leukocytosis, but slight left shift of 76%. US demonstrated GB wall thickening from adenomyosis and an abundance of gallstones and sludge, however there was no visible intra or extrahepatic biliary ductal dilation. Follow up MRCP was performed to rule out choledocholithiasis, but no biliary obstruction was seen (no CBD dilation, no common duct stone, no Mirizzi syndrome). General surgery consulted for evaluation of possible cholecystitis.     Pt states that she was in her usual state of health until Friday. She had epigastric pain, that migrated to her RUQ and radiated through to her back. Several episodes of NBNB emesis, dehydrated upon presentation. Notes cola-colored urine today. Last BM this AM, normal in color and consistency. ED concerned for jaundice, but patient and her mother state that this is her normal complexion (she likes to tan outside). No scleral icterus nor yellowing of her mucosal membranes.     PSH: no prior abdominal surgeries. Tonillectomy as a child, did well with anesthesia.  Substance: socially drinks about twice a month (2-6 beers on those days)  Social: immigrated from [place]. 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       Post-Op Info:  Procedure(s) (LRB):  ERCP (ENDOSCOPIC RETROGRADE CHOLANGIOPANCREATOGRAPHY) (N/A)  ULTRASOUND, UPPER GI TRACT, ENDOSCOPIC (N/A)         Interval History: NAEON, AF, VSS. Abdominal pain better today.  Tolerating some to eat.  LFTs downtrending today and t. Bili down today.    Medications:  Continuous Infusions:   lactated ringers 100 mL/hr at 01/15/23 1519     Scheduled Meds:  PRN Meds:acetaminophen, dextrose 10%, dextrose 10%, glucagon (human recombinant), glucose, glucose, morphine, naloxone, ondansetron,  sodium chloride 0.9%     Review of patient's allergies indicates:   Allergen Reactions    Iodine and iodide containing products     Shellfish containing products      Objective:     Vital Signs (Most Recent):  Temp: 97.2 °F (36.2 °C) (01/16/23 0822)  Pulse: 70 (01/16/23 0822)  Resp: 18 (01/16/23 0822)  BP: 113/73 (01/16/23 0822)  SpO2: 96 % (01/16/23 0822) Vital Signs (24h Range):  Temp:  [97.2 °F (36.2 °C)-98.4 °F (36.9 °C)] 97.2 °F (36.2 °C)  Pulse:  [64-72] 70  Resp:  [18-20] 18  SpO2:  [94 %-97 %] 96 %  BP: (113-155)/(66-86) 113/73     Weight: 121.3 kg (267 lb 6.7 oz)  Body mass index is 41.88 kg/m².    Intake/Output - Last 3 Shifts         01/14 0700  01/15 0659 01/15 0700  01/16 0659 01/16 0700  01/17 0659    P.O.  840     I.V. (mL/kg)  3388.1 (27.9)     Total Intake(mL/kg)  4228.1 (34.9)     Urine (mL/kg/hr) 0      Emesis/NG output 200      Total Output 200      Net -200 +4228.1            Urine Occurrence 2 x 8 x     Stool Occurrence  0 x     Emesis Occurrence  0 x             Physical Exam  Constitutional:       General: She is not in acute distress.     Appearance: She is well-developed. She is obese. She is not ill-appearing.   Neck:      Vascular: No JVD.      Trachea: No tracheal deviation.   Cardiovascular:      Rate and Rhythm: Normal rate and regular rhythm.   Pulmonary:      Effort: No respiratory distress.      Breath sounds: Normal breath sounds.   Abdominal:      Palpations: Abdomen is soft.      Comments: Abdomen is soft and non-distended. No surgical scars. Minimally tender in the RUQ and epigastrium. Not peritoneal.    Skin:     General: Skin is warm and dry.   Neurological:      Mental Status: She is alert and oriented to person, place, and time.       Significant Labs:  I have reviewed all pertinent lab results within the past 24 hours.  CBC:   Recent Labs   Lab 01/16/23  0422   WBC 5.59   RBC 4.42   HGB 12.7   HCT 39.8      MCV 90   MCH 28.7   MCHC 31.9*     CMP:   Recent Labs    Lab 01/16/23  0422   GLU 93   CALCIUM 9.1   ALBUMIN 2.9*   PROT 6.6   *   K 3.8   CO2 20*      BUN 4*   CREATININE 0.7   ALKPHOS 161*   *   *   BILITOT 5.4*       Significant Diagnostics:  I have reviewed all pertinent imaging results/findings within the past 24 hours.    Assessment/Plan:     * Right upper quadrant abdominal pain  Brooke Moss is a 47 y.o. female with acute onset RUQ pain, N/V. Tbili 6.1 and LFTs 800/1200 on admit. US shows GB adenomyosis and stones/sludge, MRCP does not demonstrate any ductal obstruction. No imaging evidence to support choledocholithiasis at the current time.  Hepatitis panel negative. Serum tylenol level normal.   Possible that she had an obstructing CBD stone that passed prior to imaging. However, other medical causes of her hyperbilirubinemia still need to be evaluated.     - If LFTs/bili continue to trend down, would likely plan for lap jessica on Tomorrow (tentatively), with the presumptive diagnosis of resolved choledocholithiasis. Patient understands the need for medical workup, and is amenable to lap jessica if appropriate.   - Ok for diet until day of surgery, make NPO at midnight tonight in preparation for possible operative intervention tomorrow  - Rest of care per primary  - Please feel free to contact General Surgery team if any questions, concerns, or changes in clinical status.         Jose Trevino MD  General Surgery  [facility]

## 2023-01-17 ENCOUNTER — ANESTHESIA EVENT (OUTPATIENT)
Dept: ENDOSCOPY | Facility: HOSPITAL | Age: 48
DRG: 418 | End: 2023-01-17
Payer: COMMERCIAL

## 2023-01-17 ENCOUNTER — ANESTHESIA (OUTPATIENT)
Dept: ENDOSCOPY | Facility: HOSPITAL | Age: 48
DRG: 418 | End: 2023-01-17
Payer: COMMERCIAL

## 2023-01-17 PROBLEM — E80.6 HYPERBILIRUBINEMIA: Status: ACTIVE | Noted: 2023-01-17

## 2023-01-17 LAB
ALBUMIN SERPL BCP-MCNC: 3 G/DL (ref 3.5–5.2)
ALP SERPL-CCNC: 168 U/L (ref 55–135)
ALT SERPL W/O P-5'-P-CCNC: 713 U/L (ref 10–44)
ANION GAP SERPL CALC-SCNC: 11 MMOL/L (ref 8–16)
AST SERPL-CCNC: 215 U/L (ref 10–40)
BASOPHILS # BLD AUTO: 0.05 K/UL (ref 0–0.2)
BASOPHILS NFR BLD: 0.9 % (ref 0–1.9)
BILIRUB SERPL-MCNC: 5.5 MG/DL (ref 0.1–1)
BUN SERPL-MCNC: 3 MG/DL (ref 6–20)
CALCIUM SERPL-MCNC: 9 MG/DL (ref 8.7–10.5)
CHLORIDE SERPL-SCNC: 106 MMOL/L (ref 95–110)
CO2 SERPL-SCNC: 20 MMOL/L (ref 23–29)
CREAT SERPL-MCNC: 0.6 MG/DL (ref 0.5–1.4)
DIFFERENTIAL METHOD: ABNORMAL
EOSINOPHIL # BLD AUTO: 0.3 K/UL (ref 0–0.5)
EOSINOPHIL NFR BLD: 5.8 % (ref 0–8)
ERYTHROCYTE [DISTWIDTH] IN BLOOD BY AUTOMATED COUNT: 13.9 % (ref 11.5–14.5)
EST. GFR  (NO RACE VARIABLE): >60 ML/MIN/1.73 M^2
GLUCOSE SERPL-MCNC: 103 MG/DL (ref 70–110)
HCT VFR BLD AUTO: 43.7 % (ref 37–48.5)
HGB BLD-MCNC: 13.6 G/DL (ref 12–16)
IMM GRANULOCYTES # BLD AUTO: 0.02 K/UL (ref 0–0.04)
IMM GRANULOCYTES NFR BLD AUTO: 0.4 % (ref 0–0.5)
LYMPHOCYTES # BLD AUTO: 1 K/UL (ref 1–4.8)
LYMPHOCYTES NFR BLD: 18.2 % (ref 18–48)
MCH RBC QN AUTO: 28.2 PG (ref 27–31)
MCHC RBC AUTO-ENTMCNC: 31.1 G/DL (ref 32–36)
MCV RBC AUTO: 91 FL (ref 82–98)
MONOCYTES # BLD AUTO: 0.6 K/UL (ref 0.3–1)
MONOCYTES NFR BLD: 10.8 % (ref 4–15)
NEUTROPHILS # BLD AUTO: 3.4 K/UL (ref 1.8–7.7)
NEUTROPHILS NFR BLD: 63.9 % (ref 38–73)
NRBC BLD-RTO: 0 /100 WBC
PLATELET # BLD AUTO: 248 K/UL (ref 150–450)
PMV BLD AUTO: 10.5 FL (ref 9.2–12.9)
POTASSIUM SERPL-SCNC: 3.8 MMOL/L (ref 3.5–5.1)
PROT SERPL-MCNC: 6.9 G/DL (ref 6–8.4)
RBC # BLD AUTO: 4.82 M/UL (ref 4–5.4)
SODIUM SERPL-SCNC: 137 MMOL/L (ref 136–145)
WBC # BLD AUTO: 5.38 K/UL (ref 3.9–12.7)

## 2023-01-17 PROCEDURE — 99232 SBSQ HOSP IP/OBS MODERATE 35: CPT | Mod: ,,, | Performed by: STUDENT IN AN ORGANIZED HEALTH CARE EDUCATION/TRAINING PROGRAM

## 2023-01-17 PROCEDURE — D9220A PRA ANESTHESIA: ICD-10-PCS | Mod: CRNA,,, | Performed by: NURSE ANESTHETIST, CERTIFIED REGISTERED

## 2023-01-17 PROCEDURE — 25000003 PHARM REV CODE 250: Performed by: NURSE ANESTHETIST, CERTIFIED REGISTERED

## 2023-01-17 PROCEDURE — 74328 X-RAY BILE DUCT ENDOSCOPY: CPT | Mod: TC | Performed by: INTERNAL MEDICINE

## 2023-01-17 PROCEDURE — 43259 EGD US EXAM DUODENUM/JEJUNUM: CPT | Mod: 51,,, | Performed by: INTERNAL MEDICINE

## 2023-01-17 PROCEDURE — 37000009 HC ANESTHESIA EA ADD 15 MINS: Performed by: INTERNAL MEDICINE

## 2023-01-17 PROCEDURE — 63600175 PHARM REV CODE 636 W HCPCS: Performed by: NURSE ANESTHETIST, CERTIFIED REGISTERED

## 2023-01-17 PROCEDURE — 74328 X-RAY BILE DUCT ENDOSCOPY: CPT | Mod: 26,,, | Performed by: INTERNAL MEDICINE

## 2023-01-17 PROCEDURE — 99232 SBSQ HOSP IP/OBS MODERATE 35: CPT | Mod: 25,,,

## 2023-01-17 PROCEDURE — 99233 SBSQ HOSP IP/OBS HIGH 50: CPT | Mod: ,,, | Performed by: STUDENT IN AN ORGANIZED HEALTH CARE EDUCATION/TRAINING PROGRAM

## 2023-01-17 PROCEDURE — 63600175 PHARM REV CODE 636 W HCPCS: Performed by: STUDENT IN AN ORGANIZED HEALTH CARE EDUCATION/TRAINING PROGRAM

## 2023-01-17 PROCEDURE — 80053 COMPREHEN METABOLIC PANEL: CPT | Performed by: STUDENT IN AN ORGANIZED HEALTH CARE EDUCATION/TRAINING PROGRAM

## 2023-01-17 PROCEDURE — 36415 COLL VENOUS BLD VENIPUNCTURE: CPT | Performed by: STUDENT IN AN ORGANIZED HEALTH CARE EDUCATION/TRAINING PROGRAM

## 2023-01-17 PROCEDURE — 43264 PR ERCP,W/REMOVAL STONE,BIL/PANCR DUCTS: ICD-10-PCS | Mod: ,,, | Performed by: INTERNAL MEDICINE

## 2023-01-17 PROCEDURE — 37000008 HC ANESTHESIA 1ST 15 MINUTES: Performed by: INTERNAL MEDICINE

## 2023-01-17 PROCEDURE — 85025 COMPLETE CBC W/AUTO DIFF WBC: CPT | Performed by: STUDENT IN AN ORGANIZED HEALTH CARE EDUCATION/TRAINING PROGRAM

## 2023-01-17 PROCEDURE — 43264 ERCP REMOVE DUCT CALCULI: CPT | Mod: ,,, | Performed by: INTERNAL MEDICINE

## 2023-01-17 PROCEDURE — 43259 EGD US EXAM DUODENUM/JEJUNUM: CPT | Performed by: INTERNAL MEDICINE

## 2023-01-17 PROCEDURE — 25500020 PHARM REV CODE 255: Performed by: INTERNAL MEDICINE

## 2023-01-17 PROCEDURE — D9220A PRA ANESTHESIA: Mod: CRNA,,, | Performed by: NURSE ANESTHETIST, CERTIFIED REGISTERED

## 2023-01-17 PROCEDURE — 43264 ERCP REMOVE DUCT CALCULI: CPT | Performed by: INTERNAL MEDICINE

## 2023-01-17 PROCEDURE — 94761 N-INVAS EAR/PLS OXIMETRY MLT: CPT

## 2023-01-17 PROCEDURE — D9220A PRA ANESTHESIA: Mod: ANES,,, | Performed by: ANESTHESIOLOGY

## 2023-01-17 PROCEDURE — 99233 PR SUBSEQUENT HOSPITAL CARE,LEVL III: ICD-10-PCS | Mod: ,,, | Performed by: STUDENT IN AN ORGANIZED HEALTH CARE EDUCATION/TRAINING PROGRAM

## 2023-01-17 PROCEDURE — 27202125 HC BALLOON, EXTRACTION (ANY): Performed by: INTERNAL MEDICINE

## 2023-01-17 PROCEDURE — 74328 PR  X-RAY FOR BILE DUCT ENDOSCOPY: ICD-10-PCS | Mod: 26,,, | Performed by: INTERNAL MEDICINE

## 2023-01-17 PROCEDURE — 43262 PR ERCP,SPHINCTEROTOMY: ICD-10-PCS | Mod: ,,, | Performed by: INTERNAL MEDICINE

## 2023-01-17 PROCEDURE — 43259 PR ENDOSCOPIC ULTRASOUND EXAM: ICD-10-PCS | Mod: 51,,, | Performed by: INTERNAL MEDICINE

## 2023-01-17 PROCEDURE — C1769 GUIDE WIRE: HCPCS | Performed by: INTERNAL MEDICINE

## 2023-01-17 PROCEDURE — 43262 ENDO CHOLANGIOPANCREATOGRAPH: CPT | Mod: ,,, | Performed by: INTERNAL MEDICINE

## 2023-01-17 PROCEDURE — 43262 ENDO CHOLANGIOPANCREATOGRAPH: CPT | Mod: 59 | Performed by: INTERNAL MEDICINE

## 2023-01-17 PROCEDURE — 27201674 HC SPHINCTERTOME: Performed by: INTERNAL MEDICINE

## 2023-01-17 PROCEDURE — 99232 PR SUBSEQUENT HOSPITAL CARE,LEVL II: ICD-10-PCS | Mod: ,,, | Performed by: STUDENT IN AN ORGANIZED HEALTH CARE EDUCATION/TRAINING PROGRAM

## 2023-01-17 PROCEDURE — 25000003 PHARM REV CODE 250: Performed by: INTERNAL MEDICINE

## 2023-01-17 PROCEDURE — 99232 PR SUBSEQUENT HOSPITAL CARE,LEVL II: ICD-10-PCS | Mod: 25,,,

## 2023-01-17 PROCEDURE — D9220A PRA ANESTHESIA: ICD-10-PCS | Mod: ANES,,, | Performed by: ANESTHESIOLOGY

## 2023-01-17 PROCEDURE — 20600001 HC STEP DOWN PRIVATE ROOM

## 2023-01-17 RX ORDER — FENTANYL CITRATE 50 UG/ML
25 INJECTION, SOLUTION INTRAMUSCULAR; INTRAVENOUS EVERY 5 MIN PRN
Status: DISCONTINUED | OUTPATIENT
Start: 2023-01-17 | End: 2023-01-17 | Stop reason: HOSPADM

## 2023-01-17 RX ORDER — ONDANSETRON 2 MG/ML
4 INJECTION INTRAMUSCULAR; INTRAVENOUS DAILY PRN
Status: DISCONTINUED | OUTPATIENT
Start: 2023-01-17 | End: 2023-01-17 | Stop reason: HOSPADM

## 2023-01-17 RX ORDER — SODIUM CHLORIDE 0.9 % (FLUSH) 0.9 %
10 SYRINGE (ML) INJECTION
Status: DISCONTINUED | OUTPATIENT
Start: 2023-01-17 | End: 2023-01-17 | Stop reason: HOSPADM

## 2023-01-17 RX ORDER — SUCCINYLCHOLINE CHLORIDE 20 MG/ML
INJECTION INTRAMUSCULAR; INTRAVENOUS
Status: DISCONTINUED | OUTPATIENT
Start: 2023-01-17 | End: 2023-01-17

## 2023-01-17 RX ORDER — LIDOCAINE HYDROCHLORIDE 10 MG/ML
INJECTION, SOLUTION EPIDURAL; INFILTRATION; INTRACAUDAL; PERINEURAL
Status: DISCONTINUED | OUTPATIENT
Start: 2023-01-17 | End: 2023-01-17

## 2023-01-17 RX ORDER — ONDANSETRON 2 MG/ML
INJECTION INTRAMUSCULAR; INTRAVENOUS
Status: DISCONTINUED | OUTPATIENT
Start: 2023-01-17 | End: 2023-01-17

## 2023-01-17 RX ORDER — PROPOFOL 10 MG/ML
VIAL (ML) INTRAVENOUS
Status: DISCONTINUED | OUTPATIENT
Start: 2023-01-17 | End: 2023-01-17

## 2023-01-17 RX ORDER — FENTANYL CITRATE 50 UG/ML
INJECTION, SOLUTION INTRAMUSCULAR; INTRAVENOUS
Status: DISCONTINUED | OUTPATIENT
Start: 2023-01-17 | End: 2023-01-17

## 2023-01-17 RX ORDER — PHENYLEPHRINE HYDROCHLORIDE 10 MG/ML
INJECTION INTRAVENOUS
Status: DISCONTINUED | OUTPATIENT
Start: 2023-01-17 | End: 2023-01-17

## 2023-01-17 RX ORDER — INDOMETHACIN 50 MG/1
SUPPOSITORY RECTAL
Status: DISCONTINUED | OUTPATIENT
Start: 2023-01-17 | End: 2023-01-17 | Stop reason: HOSPADM

## 2023-01-17 RX ORDER — MIDAZOLAM HYDROCHLORIDE 1 MG/ML
INJECTION INTRAMUSCULAR; INTRAVENOUS
Status: DISCONTINUED | OUTPATIENT
Start: 2023-01-17 | End: 2023-01-17

## 2023-01-17 RX ORDER — DEXAMETHASONE SODIUM PHOSPHATE 4 MG/ML
INJECTION, SOLUTION INTRA-ARTICULAR; INTRALESIONAL; INTRAMUSCULAR; INTRAVENOUS; SOFT TISSUE
Status: DISCONTINUED | OUTPATIENT
Start: 2023-01-17 | End: 2023-01-17

## 2023-01-17 RX ORDER — ROCURONIUM BROMIDE 10 MG/ML
INJECTION, SOLUTION INTRAVENOUS
Status: DISCONTINUED | OUTPATIENT
Start: 2023-01-17 | End: 2023-01-17

## 2023-01-17 RX ADMIN — MIDAZOLAM HYDROCHLORIDE 2 MG: 1 INJECTION INTRAMUSCULAR; INTRAVENOUS at 11:01

## 2023-01-17 RX ADMIN — ROCURONIUM BROMIDE 5 MG: 10 INJECTION, SOLUTION INTRAVENOUS at 11:01

## 2023-01-17 RX ADMIN — PHENYLEPHRINE HYDROCHLORIDE 100 MCG: 10 INJECTION INTRAVENOUS at 12:01

## 2023-01-17 RX ADMIN — SODIUM CHLORIDE, POTASSIUM CHLORIDE, SODIUM LACTATE AND CALCIUM CHLORIDE: 600; 310; 30; 20 INJECTION, SOLUTION INTRAVENOUS at 03:01

## 2023-01-17 RX ADMIN — ROCURONIUM BROMIDE 20 MG: 10 INJECTION, SOLUTION INTRAVENOUS at 12:01

## 2023-01-17 RX ADMIN — SODIUM CHLORIDE: 0.9 INJECTION, SOLUTION INTRAVENOUS at 11:01

## 2023-01-17 RX ADMIN — PROPOFOL 200 MG: 10 INJECTION, EMULSION INTRAVENOUS at 11:01

## 2023-01-17 RX ADMIN — SUCCINYLCHOLINE CHLORIDE 120 MG: 20 INJECTION, SOLUTION INTRAMUSCULAR; INTRAVENOUS at 11:01

## 2023-01-17 RX ADMIN — FENTANYL CITRATE 100 MCG: 50 INJECTION, SOLUTION INTRAMUSCULAR; INTRAVENOUS at 11:01

## 2023-01-17 RX ADMIN — DEXAMETHASONE SODIUM PHOSPHATE 4 MG: 4 INJECTION, SOLUTION INTRAMUSCULAR; INTRAVENOUS at 12:01

## 2023-01-17 RX ADMIN — SUGAMMADEX 200 MG: 100 INJECTION, SOLUTION INTRAVENOUS at 01:01

## 2023-01-17 RX ADMIN — LIDOCAINE HYDROCHLORIDE 50 MG: 10 INJECTION, SOLUTION EPIDURAL; INFILTRATION; INTRACAUDAL; PERINEURAL at 11:01

## 2023-01-17 RX ADMIN — ONDANSETRON 4 MG: 2 INJECTION INTRAMUSCULAR; INTRAVENOUS at 12:01

## 2023-01-17 NOTE — TRANSFER OF CARE
"Anesthesia Transfer of Care Note    Patient: Brooke Moss    Procedure(s) Performed: Procedure(s) (LRB):  ULTRASOUND, UPPER GI TRACT, ENDOSCOPIC (N/A)  ERCP (ENDOSCOPIC RETROGRADE CHOLANGIOPANCREATOGRAPHY) (N/A)    Patient location: PACU    Anesthesia Type: general    Transport from OR: Transported from OR on 6-10 L/min O2 by face mask with adequate spontaneous ventilation    Post pain: adequate analgesia    Post assessment: no apparent anesthetic complications    Post vital signs: stable    Level of consciousness: awake and alert    Nausea/Vomiting: no nausea/vomiting    Complications: none    Transfer of care protocol was followed      Last vitals:   Visit Vitals  /74 (BP Location: Right arm, Patient Position: Lying)   Pulse 87   Temp 36.5 °C (97.7 °F) (Temporal)   Resp 20   Ht 5' 7" (1.702 m)   Wt 121.3 kg (267 lb 6.7 oz)   SpO2 98%   Breastfeeding No   BMI 41.88 kg/m²     "

## 2023-01-17 NOTE — SUBJECTIVE & OBJECTIVE
Interval History: NAEON. Afebrile. HDS. Pain controlled. Tolerating diet. T bili 5.4 > 5.5, LFTs coming down but still elevated  To get ERCP today     Medications:  Continuous Infusions:   lactated ringers 100 mL/hr at 01/17/23 0545     Scheduled Meds:  PRN Meds:acetaminophen, dextrose 10%, dextrose 10%, glucagon (human recombinant), glucose, glucose, morphine, naloxone, ondansetron, sodium chloride 0.9%     Review of patient's allergies indicates:   Allergen Reactions    Iodine and iodide containing products     Shellfish containing products      Objective:     Vital Signs (Most Recent):  Temp: 98.4 °F (36.9 °C) (01/17/23 0455)  Pulse: 71 (01/17/23 0455)  Resp: 20 (01/17/23 0455)  BP: (!) 153/67 (01/17/23 0455)  SpO2: 96 % (01/17/23 0455) Vital Signs (24h Range):  Temp:  [97 °F (36.1 °C)-98.4 °F (36.9 °C)] 98.4 °F (36.9 °C)  Pulse:  [66-93] 71  Resp:  [18-20] 20  SpO2:  [95 %-100 %] 96 %  BP: (113-153)/(65-84) 153/67     Weight: 121.3 kg (267 lb 6.7 oz)  Body mass index is 41.88 kg/m².    Intake/Output - Last 3 Shifts         01/15 0700  01/16 0659 01/16 0700  01/17 0659 01/17 0700  01/18 0659    P.O. 840      I.V. (mL/kg) 3388.1 (27.9) 2190.7 (18.1)     Total Intake(mL/kg) 4228.1 (34.9) 2190.7 (18.1)     Urine (mL/kg/hr)       Emesis/NG output       Total Output       Net +4228.1 +2190.7            Urine Occurrence 8 x      Stool Occurrence 0 x      Emesis Occurrence 0 x              Physical Exam  Vitals and nursing note reviewed.   Constitutional:       General: She is not in acute distress.     Appearance: She is well-developed. She is obese. She is not ill-appearing.      Comments: Room air   HENT:      Head: Normocephalic and atraumatic.      Mouth/Throat:      Mouth: Mucous membranes are moist.      Pharynx: Oropharynx is clear.   Eyes:      Extraocular Movements: Extraocular movements intact.      Conjunctiva/sclera: Conjunctivae normal.   Cardiovascular:      Rate and Rhythm: Normal rate.   Pulmonary:       Effort: Pulmonary effort is normal. No respiratory distress.   Abdominal:      General: There is no distension.      Palpations: Abdomen is soft.      Tenderness: There is no abdominal tenderness. There is no guarding or rebound.      Comments: Abdomen is soft and non-distended. No surgical scars. Minimally tender in the RUQ and epigastrium. Not peritoneal.    Musculoskeletal:         General: No deformity.   Skin:     General: Skin is warm and dry.   Neurological:      Mental Status: She is alert and oriented to person, place, and time.       Significant Labs:  I have reviewed all pertinent lab results within the past 24 hours.  CBC:   Recent Labs   Lab 01/17/23 0409   WBC 5.38   RBC 4.82   HGB 13.6   HCT 43.7      MCV 91   MCH 28.2   MCHC 31.1*       CMP:   Recent Labs   Lab 01/17/23 0409      CALCIUM 9.0   ALBUMIN 3.0*   PROT 6.9      K 3.8   CO2 20*      BUN 3*   CREATININE 0.6   ALKPHOS 168*   *   *   BILITOT 5.5*         Significant Diagnostics:  I have reviewed all pertinent imaging results/findings within the past 24 hours.

## 2023-01-17 NOTE — PROVATION PATIENT INSTRUCTIONS
Discharge Summary/Instructions after an Endoscopic Procedure  Patient Name: Brooke Moss  Patient MRN: [id number]  Patient YOB: 1975 Tuesday, January 17, 2023  Opal Prado MD  Dear patient,  As a result of recent federal legislation (The Federal Cures Act), you may   receive lab or pathology results from your procedure in your [facility]   account before your physician is able to contact you. Your physician or   their representative will relay the results to you with their   recommendations at their soonest availability.  Thank you,  RESTRICTIONS:  During your procedure today, you received medications for sedation.  These   medications may affect your judgment, balance and coordination.  Therefore,   for 24 hours, you have the following restrictions:   - DO NOT drive a car, operate machinery, make legal/financial decisions,   sign important papers or drink alcohol.    ACTIVITY:  Today: no heavy lifting, straining or running due to procedural   sedation/anesthesia.  The following day: return to full activity including work.  DIET:  Eat and drink normally unless instructed otherwise.     TREATMENT FOR COMMON SIDE EFFECTS:  - Mild abdominal pain, nausea, belching, bloating or excessive gas:  rest,   eat lightly and use a heating pad.  - Sore Throat: treat with throat lozenges and/or gargle with warm salt   water.  - Because air was used during the procedure, expelling large amounts of air   from your rectum or belching is normal.  - If a bowel prep was taken, you may not have a bowel movement for 1-3 days.    This is normal.  SYMPTOMS TO WATCH FOR AND REPORT TO YOUR PHYSICIAN:  1. Abdominal pain or bloating, other than gas cramps.  2. Chest pain.  3. Back pain.  4. Signs of infection such as: chills or fever occurring within 24 hours   after the procedure.  5. Rectal bleeding, which would show as bright red, maroon, or black stools.   (A tablespoon of blood from the rectum is not serious, especially if    hemorrhoids are present.)  6. Vomiting.  7. Weakness or dizziness.  GO DIRECTLY TO THE NEAREST EMERGENCY ROOM IF YOU HAVE ANY OF THE FOLLOWING:      Difficulty breathing              Chills and/or fever over 101 F   Persistent vomiting and/or vomiting blood   Severe abdominal pain   Severe chest pain   Black, tarry stools   Bleeding- more than one tablespoon   Any other symptom or condition that you feel may need urgent attention  Your doctor recommends these additional instructions:  If any biopsies were taken, your doctors clinic will contact you in 1 to 2   weeks with any results.  - Perform an ERCP today.   - Return patient to hospital cloud for ongoing care.   - Refer to a surgeon for cholecystectomy.  For questions, problems or results please call your physician - Opal Prado MD at Work:  [phone].  [facility], EMERGENCY ROOM PHONE NUMBER: [phone]  IF A COMPLICATION OR EMERGENCY SITUATION ARISES AND YOU ARE UNABLE TO REACH   YOUR PHYSICIAN - GO DIRECTLY TO THE EMERGENCY ROOM.  Opal Prado MD  1/17/2023 12:31:42 PM  This report has been verified and signed electronically.  Dear patient,  As a result of recent federal legislation (The Federal Cures Act), you may   receive lab or pathology results from your procedure in your [facility]   account before your physician is able to contact you. Your physician or   their representative will relay the results to you with their   recommendations at their soonest availability.  Thank you,  PROVATION

## 2023-01-17 NOTE — NURSING TRANSFER
Nursing Transfer Note      1/17/2023     Reason patient is being transferred: post op    Transfer To: 1055    Transfer via stretcher    Transfer with none    Transported by PCT    Medicines sent: None    Any special needs or follow-up needed: None    Chart send with patient: Yes    Notified: spouse    Patient reassessed at: 1/18/2023 1400    Upon arrival to floor: patient oriented to room, call bell in reach, and bed in lowest position

## 2023-01-17 NOTE — PROVATION PATIENT INSTRUCTIONS
Discharge Summary/Instructions after an Endoscopic Procedure  Patient Name: Brooke Moss  Patient MRN: [id number]  Patient YOB: 1975 Tuesday, January 17, 2023  Opal Prado MD  Dear patient,  As a result of recent federal legislation (The Federal Cures Act), you may   receive lab or pathology results from your procedure in your [facility]   account before your physician is able to contact you. Your physician or   their representative will relay the results to you with their   recommendations at their soonest availability.  Thank you,  RESTRICTIONS:  During your procedure today, you received medications for sedation.  These   medications may affect your judgment, balance and coordination.  Therefore,   for 24 hours, you have the following restrictions:   - DO NOT drive a car, operate machinery, make legal/financial decisions,   sign important papers or drink alcohol.    ACTIVITY:  Today: no heavy lifting, straining or running due to procedural   sedation/anesthesia.  The following day: return to full activity including work.  DIET:  Eat and drink normally unless instructed otherwise.     TREATMENT FOR COMMON SIDE EFFECTS:  - Mild abdominal pain, nausea, belching, bloating or excessive gas:  rest,   eat lightly and use a heating pad.  - Sore Throat: treat with throat lozenges and/or gargle with warm salt   water.  - Because air was used during the procedure, expelling large amounts of air   from your rectum or belching is normal.  - If a bowel prep was taken, you may not have a bowel movement for 1-3 days.    This is normal.  SYMPTOMS TO WATCH FOR AND REPORT TO YOUR PHYSICIAN:  1. Abdominal pain or bloating, other than gas cramps.  2. Chest pain.  3. Back pain.  4. Signs of infection such as: chills or fever occurring within 24 hours   after the procedure.  5. Rectal bleeding, which would show as bright red, maroon, or black stools.   (A tablespoon of blood from the rectum is not serious, especially if    hemorrhoids are present.)  6. Vomiting.  7. Weakness or dizziness.  GO DIRECTLY TO THE NEAREST EMERGENCY ROOM IF YOU HAVE ANY OF THE FOLLOWING:      Difficulty breathing              Chills and/or fever over 101 F   Persistent vomiting and/or vomiting blood   Severe abdominal pain   Severe chest pain   Black, tarry stools   Bleeding- more than one tablespoon   Any other symptom or condition that you feel may need urgent attention  Your doctor recommends these additional instructions:  If any biopsies were taken, your doctors clinic will contact you in 1 to 2   weeks with any results.  - Return patient to hospital cloud for ongoing care.   - Resume previous diet.   - Continue present medications.   - Watch for pancreatitis, bleeding, perforation, and cholangitis.   - Refer to Surgery to plan cholecystectomy.  For questions, problems or results please call your physician - Opal Prado MD at Work:  [phone].  [facility], EMERGENCY ROOM PHONE NUMBER: [phone]  IF A COMPLICATION OR EMERGENCY SITUATION ARISES AND YOU ARE UNABLE TO REACH   YOUR PHYSICIAN - GO DIRECTLY TO THE EMERGENCY ROOM.  Opal Prado MD  1/17/2023 1:10:54 PM  This report has been verified and signed electronically.  Dear patient,  As a result of recent federal legislation (The Federal Cures Act), you may   receive lab or pathology results from your procedure in your [facility]   account before your physician is able to contact you. Your physician or   their representative will relay the results to you with their   recommendations at their soonest availability.  Thank you,  PROVATION

## 2023-01-17 NOTE — ASSESSMENT & PLAN NOTE
Brooke Moss is a 47 y.o. female with acute onset RUQ pain, N/V. Tbili 6.1 and LFTs 800/1200 on admit. US shows GB adenomyosis and stones/sludge, MRCP does not demonstrate any ductal obstruction. No imaging evidence to support choledocholithiasis at the current time.  Hepatitis panel negative. Serum tylenol level normal.   Possible that she had an obstructing CBD stone that passed prior to imaging. However, other medical causes of her hyperbilirubinemia still need to be evaluated.     - NPO  - Given persistent hyperbilirubinemia, recommend EUS/ERCP prior to operative intervention   - Remainder of care per primary team  - Please contact general surgery with any questions, concerns, or clinical status changes

## 2023-01-17 NOTE — PLAN OF CARE
End of Shift:    Patient in bed visiting with mother. Patient without c/o pain. No needs or concerns at this time. VSS. AAOx4. RA. No tele. Clear liquids. Ambulates independently.   No PRNs given    Bed locked and in lowest position with side rails up x2. Call light and bedside table within reach.    Will continue plan of care until hand off.     Problem: Adult Inpatient Plan of Care  Goal: Plan of Care Review  Outcome: Ongoing, Progressing  Goal: Patient-Specific Goal (Individualized)  Outcome: Ongoing, Progressing  Goal: Absence of Hospital-Acquired Illness or Injury  Outcome: Ongoing, Progressing  Goal: Optimal Comfort and Wellbeing  Outcome: Ongoing, Progressing  Goal: Readiness for Transition of Care  Outcome: Ongoing, Progressing     Problem: Bariatric Environmental Safety  Goal: Safety Maintained with Care  Outcome: Ongoing, Progressing

## 2023-01-17 NOTE — SUBJECTIVE & OBJECTIVE
Subjective:     Interval History: No adverse events overnight.Patient c/o RUQ pain with mild nausea. She denies emesis, diarrhea, hematochezia, and hematemesis. No improvement of Tbili or ALP overnight. Patient remains afebrile and HDS.     Review of Systems   Constitutional:  Negative for chills, diaphoresis and fever.   Eyes:  Negative for discharge and redness.   Gastrointestinal:  Positive for abdominal pain and nausea. Negative for abdominal distention, blood in stool, diarrhea and vomiting.   Skin:  Negative for color change and pallor.   Neurological:  Negative for syncope and speech difficulty.   Objective:     Vital Signs (Most Recent):  Temp: 98.2 °F (36.8 °C) (01/17/23 1050)  Pulse: 70 (01/17/23 1050)  Resp: 16 (01/17/23 1050)  BP: 127/78 (01/17/23 1050)  SpO2: 97 % (01/17/23 1050) Vital Signs (24h Range):  Temp:  [97 °F (36.1 °C)-98.4 °F (36.9 °C)] 98.2 °F (36.8 °C)  Pulse:  [66-93] 70  Resp:  [16-20] 16  SpO2:  [95 %-100 %] 97 %  BP: (120-153)/(65-84) 127/78     Weight: 121.3 kg (267 lb 6.7 oz) (01/15/23 0710)  Body mass index is 41.88 kg/m².      Intake/Output Summary (Last 24 hours) at 1/17/2023 1055  Last data filed at 1/17/2023 0545  Gross per 24 hour   Intake 2190.71 ml   Output --   Net 2190.71 ml       Lines/Drains/Airways       Peripheral Intravenous Line  Duration                  Peripheral IV - Single Lumen 01/14/23 1239 20 G Right Antecubital 2 days         Peripheral IV - Single Lumen 01/14/23 2128 Anterior;Distal;Right Forearm 2 days                    Physical Exam  Vitals and nursing note reviewed.   Constitutional:       General: She is not in acute distress.     Appearance: She is obese. She is not toxic-appearing.   Eyes:      General: No scleral icterus.        Right eye: No discharge.         Left eye: No discharge.   Abdominal:      General: Abdomen is flat. Bowel sounds are normal. There is no distension.      Palpations: Abdomen is soft.      Tenderness: There is abdominal  tenderness in the right upper quadrant. There is no guarding. Positive signs include Shell's sign.   Skin:     General: Skin is warm and dry.      Coloration: Skin is not jaundiced or pale.   Neurological:      Mental Status: She is alert and oriented to person, place, and time.       Significant Labs:  CBC:   Recent Labs   Lab 01/16/23  0422 01/17/23  0409   WBC 5.59 5.38   HGB 12.7 13.6   HCT 39.8 43.7    248     CMP:   Recent Labs   Lab 01/17/23  0409      CALCIUM 9.0   ALBUMIN 3.0*   PROT 6.9      K 3.8   CO2 20*      BUN 3*   CREATININE 0.6   ALKPHOS 168*   *   *   BILITOT 5.5*     Coagulation: No results for input(s): PT, INR, APTT in the last 48 hours.      Significant Imaging:  Imaging results within the past 24 hours have been reviewed.

## 2023-01-17 NOTE — ANESTHESIA POSTPROCEDURE EVALUATION
And Ventricular Fibrillation  - due to hypokalemia, now improved     Anesthesia Post Evaluation    Patient: Brooke Moss    Procedure(s) Performed: Procedure(s) (LRB):  ULTRASOUND, UPPER GI TRACT, ENDOSCOPIC (N/A)  ERCP (ENDOSCOPIC RETROGRADE CHOLANGIOPANCREATOGRAPHY) (N/A)    Final Anesthesia Type: general      Patient location during evaluation: [facility]  Patient participation: Yes- Able to Participate  Level of consciousness: awake and alert  Post-procedure vital signs: reviewed and stable  Pain management: adequate  Airway patency: patent    PONV status at discharge: No PONV  Anesthetic complications: no      Cardiovascular status: hemodynamically stable  Respiratory status: unassisted, spontaneous ventilation and room air  Hydration status: euvolemic  Follow-up not needed.          Vitals Value Taken Time   /69 01/17/23 1347   Temp 36.5 °C (97.7 °F) 01/17/23 1315   Pulse 70 01/17/23 1400   Resp 18 01/17/23 1400   SpO2 98 % 01/17/23 1400         Event Time   Out of Recovery 13:30:00         Pain/Krystian Score: Pain Rating Prior to Med Admin: 7 (1/16/2023  8:31 AM)  Pain Rating Post Med Admin: 0 (1/16/2023  9:01 AM)  Krystian Score: 10 (1/17/2023  2:00 PM)

## 2023-01-17 NOTE — H&P
Short Stay Endoscopy History and Physical    PCP - Primary Doctor No  Referring Physician - Saji Medina MD  [id number] [place] [address]    Procedure - EUS possible ERCP  ASA - per anesthesia  Mallampati - per anesthesia  History of Anesthesia problems - per anesthesia  Family history Anesthesia problems -  per anesthesia   Plan of anesthesia - per anesthesia    HPI:  This is a 47 y.o. female here for evaluation of: EUS and possible ERCP if stone seen in bile duct on EUS; suspected choledocholithiasis with hyperbilirubinemia and RUQ abdo pain    Reflux - no  Dysphagia - no  Abdominal pain - yes mild RUQ  Diarrhea - no    ROS:  Constitutional: No fevers, chills, No weight loss  CV: No chest pain  Pulm: No cough, No shortness of breath  Ophtho: No vision changes  GI: see HPI  Derm: No rash    Medical History:  has no past medical history on file.    Surgical History:  has a past surgical history that includes incision and drainage, pilonidal cyst, simple (02/19/2021).    Family History: family history is not on file..    Social History:  reports that she has been smoking vaping with nicotine and cigarettes. She has never used smokeless tobacco. She reports that she does not drink alcohol and does not use drugs.    Review of patient's allergies indicates:   Allergen Reactions    Iodine and iodide containing products     Shellfish containing products        Medications:   Medications Prior to Admission   Medication Sig Dispense Refill Last Dose    azelastine (ASTELIN) 137 mcg (0.1 %) nasal spray 2 sprays (274 mcg total) by Nasal route 2 (two) times daily. 30 mL 0     methocarbamoL (ROBAXIN) 500 MG Tab Take 1 tablet (500 mg total) by mouth nightly as needed (back pain). 15 tablet 0     mupirocin (BACTROBAN) 2 % ointment Apply topically 4 (four) times daily. (Patient not taking: No sig reported) 1 Tube 0     mupirocin (BACTROBAN) 2 % ointment Apply to affected area 3 times daily  (Patient not taking: No sig reported) 22 g 0        Physical Exam:    Vital Signs:   Vitals:    01/17/23 0716   BP: 120/75   Pulse: 70   Resp: 18   Temp: 97.3 °F (36.3 °C)       General Appearance: Well appearing in no acute distress    Labs:  Lab Results   Component Value Date    WBC 5.38 01/17/2023    HGB 13.6 01/17/2023    HCT 43.7 01/17/2023     01/17/2023     (H) 01/17/2023     (H) 01/17/2023     01/17/2023    K 3.8 01/17/2023     01/17/2023    CREATININE 0.6 01/17/2023    BUN 3 (L) 01/17/2023    CO2 20 (L) 01/17/2023    INR 1.1 01/14/2023       I have explained the risks and benefits of this endoscopic procedure to the patient including but not limited to bleeding, inflammation, infection, perforation, pancreatitis, missing a lesion and death.      Opal Prado MD

## 2023-01-17 NOTE — PROGRESS NOTES
[facility]  General Surgery  Progress Note    Subjective:     History of Present Illness:  Brooke Moss is a 47 y.o. female presenting with RUQ pain on 1/14/22. Labwork significant for Tbili 6.1 and LFTs of 895/1212. No leukocytosis, but slight left shift of 76%. US demonstrated GB wall thickening from adenomyosis and an abundance of gallstones and sludge, however there was no visible intra or extrahepatic biliary ductal dilation. Follow up MRCP was performed to rule out choledocholithiasis, but no biliary obstruction was seen (no CBD dilation, no common duct stone, no Mirizzi syndrome). General surgery consulted for evaluation of possible cholecystitis.     Pt states that she was in her usual state of health until Friday. She had epigastric pain, that migrated to her RUQ and radiated through to her back. Several episodes of NBNB emesis, dehydrated upon presentation. Notes cola-colored urine today. Last BM this AM, normal in color and consistency. ED concerned for jaundice, but patient and her mother state that this is her normal complexion (she likes to tan outside). No scleral icterus nor yellowing of her mucosal membranes.     PSH: no prior abdominal surgeries. Tonillectomy as a child, did well with anesthesia.  Substance: socially drinks about twice a month (2-6 beers on those days)  Social: immigrated from [place]. 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       Post-Op Info:  Procedure(s) (LRB):  ERCP (ENDOSCOPIC RETROGRADE CHOLANGIOPANCREATOGRAPHY) (N/A)  ULTRASOUND, UPPER GI TRACT, ENDOSCOPIC (N/A)   1 Day Post-Op     Interval History: NAEON. Afebrile. HDS. Pain controlled. Tolerating diet. T bili 5.4 > 5.5, LFTs coming down but still elevated  To get ERCP today     Medications:  Continuous Infusions:   lactated ringers 100 mL/hr at 01/17/23 0545     Scheduled Meds:  PRN Meds:acetaminophen, dextrose 10%, dextrose 10%, glucagon (human recombinant), glucose, glucose, morphine,  naloxone, ondansetron, sodium chloride 0.9%     Review of patient's allergies indicates:   Allergen Reactions    Iodine and iodide containing products     Shellfish containing products      Objective:     Vital Signs (Most Recent):  Temp: 98.4 °F (36.9 °C) (01/17/23 0455)  Pulse: 71 (01/17/23 0455)  Resp: 20 (01/17/23 0455)  BP: (!) 153/67 (01/17/23 0455)  SpO2: 96 % (01/17/23 0455) Vital Signs (24h Range):  Temp:  [97 °F (36.1 °C)-98.4 °F (36.9 °C)] 98.4 °F (36.9 °C)  Pulse:  [66-93] 71  Resp:  [18-20] 20  SpO2:  [95 %-100 %] 96 %  BP: (113-153)/(65-84) 153/67     Weight: 121.3 kg (267 lb 6.7 oz)  Body mass index is 41.88 kg/m².    Intake/Output - Last 3 Shifts         01/15 0700  01/16 0659 01/16 0700  01/17 0659 01/17 0700  01/18 0659    P.O. 840      I.V. (mL/kg) 3388.1 (27.9) 2190.7 (18.1)     Total Intake(mL/kg) 4228.1 (34.9) 2190.7 (18.1)     Urine (mL/kg/hr)       Emesis/NG output       Total Output       Net +4228.1 +2190.7            Urine Occurrence 8 x      Stool Occurrence 0 x      Emesis Occurrence 0 x              Physical Exam  Vitals and nursing note reviewed.   Constitutional:       General: She is not in acute distress.     Appearance: She is well-developed. She is obese. She is not ill-appearing.      Comments: Room air   HENT:      Head: Normocephalic and atraumatic.      Mouth/Throat:      Mouth: Mucous membranes are moist.      Pharynx: Oropharynx is clear.   Eyes:      Extraocular Movements: Extraocular movements intact.      Conjunctiva/sclera: Conjunctivae normal.   Cardiovascular:      Rate and Rhythm: Normal rate.   Pulmonary:      Effort: Pulmonary effort is normal. No respiratory distress.   Abdominal:      General: There is no distension.      Palpations: Abdomen is soft.      Tenderness: There is no abdominal tenderness. There is no guarding or rebound.      Comments: Abdomen is soft and non-distended. No surgical scars. Minimally tender in the RUQ and epigastrium. Not peritoneal.     Musculoskeletal:         General: No deformity.   Skin:     General: Skin is warm and dry.   Neurological:      Mental Status: She is alert and oriented to person, place, and time.       Significant Labs:  I have reviewed all pertinent lab results within the past 24 hours.  CBC:   Recent Labs   Lab 01/17/23  0409   WBC 5.38   RBC 4.82   HGB 13.6   HCT 43.7      MCV 91   MCH 28.2   MCHC 31.1*       CMP:   Recent Labs   Lab 01/17/23  0409      CALCIUM 9.0   ALBUMIN 3.0*   PROT 6.9      K 3.8   CO2 20*      BUN 3*   CREATININE 0.6   ALKPHOS 168*   *   *   BILITOT 5.5*         Significant Diagnostics:  I have reviewed all pertinent imaging results/findings within the past 24 hours.    Assessment/Plan:     * Right upper quadrant abdominal pain  Brooke Moss is a 47 y.o. female with acute onset RUQ pain, N/V. Tbili 6.1 and LFTs 800/1200 on admit. US shows GB adenomyosis and stones/sludge, MRCP does not demonstrate any ductal obstruction. No imaging evidence to support choledocholithiasis at the current time.  Hepatitis panel negative. Serum tylenol level normal.   Possible that she had an obstructing CBD stone that passed prior to imaging. However, other medical causes of her hyperbilirubinemia still need to be evaluated.     - NPO  - Given persistent hyperbilirubinemia, recommend EUS/ERCP prior to operative intervention   - Remainder of care per primary team  - Please contact general surgery with any questions, concerns, or clinical status changes      Case discussed with general surgery staff    Prince Llamas PA-C  General Surgery  [facility]

## 2023-01-17 NOTE — ANESTHESIA RELEASE NOTE
"Anesthesia Release from PACU Note    Patient: Brooke Moss    Procedure(s) Performed: Procedure(s) (LRB):  ULTRASOUND, UPPER GI TRACT, ENDOSCOPIC (N/A)  ERCP (ENDOSCOPIC RETROGRADE CHOLANGIOPANCREATOGRAPHY) (N/A)    Anesthesia type: general    Post pain: Adequate analgesia    Post assessment: no apparent anesthetic complications and tolerated procedure well    Last Vitals:   Visit Vitals  /80   Pulse 66   Temp 36.5 °C (97.7 °F)   Resp 18   Ht 5' 7" (1.702 m)   Wt 121.3 kg (267 lb 6.7 oz)   SpO2 (!) 93%   Breastfeeding No   BMI 41.88 kg/m²       Post vital signs: stable    Level of consciousness: awake and alert     Nausea/Vomiting: no nausea/no vomiting    Complications: none    Airway Patency: patent    Respiratory: unassisted, spontaneous ventilation, room air    Cardiovascular: stable and blood pressure at baseline    Hydration: euvolemic  "

## 2023-01-17 NOTE — ANESTHESIA PREPROCEDURE EVALUATION
01/17/2023  Brooke Moss is a 47 y.o., female.    Pre-operative evaluation for Procedure(s) (LRB):  ULTRASOUND, UPPER GI TRACT, ENDOSCOPIC (N/A)  ERCP (ENDOSCOPIC RETROGRADE CHOLANGIOPANCREATOGRAPHY) (N/A)    Brooke Moss is a 47 y.o. female     Patient Active Problem List   Diagnosis    Right upper quadrant abdominal pain       Review of patient's allergies indicates:   Allergen Reactions    Iodine and iodide containing products     Shellfish containing products        No current facility-administered medications on file prior to encounter.     Current Outpatient Medications on File Prior to Encounter   Medication Sig Dispense Refill    azelastine (ASTELIN) 137 mcg (0.1 %) nasal spray 2 sprays (274 mcg total) by Nasal route 2 (two) times daily. 30 mL 0    methocarbamoL (ROBAXIN) 500 MG Tab Take 1 tablet (500 mg total) by mouth nightly as needed (back pain). 15 tablet 0    mupirocin (BACTROBAN) 2 % ointment Apply topically 4 (four) times daily. (Patient not taking: No sig reported) 1 Tube 0    mupirocin (BACTROBAN) 2 % ointment Apply to affected area 3 times daily (Patient not taking: No sig reported) 22 g 0       Past Surgical History:   Procedure Laterality Date    INCISION AND DRAINAGE, PILONIDAL CYST, SIMPLE  02/19/2021       Social History     Socioeconomic History    Marital status: Single   Tobacco Use    Smoking status: Every Day     Types: Vaping with nicotine, Cigarettes    Smokeless tobacco: Never   Substance and Sexual Activity    Alcohol use: No    Drug use: No    Sexual activity: Not Currently         CBC:   Recent Labs     01/16/23  0422 01/17/23  0409   WBC 5.59 5.38   RBC 4.42 4.82   HGB 12.7 13.6   HCT 39.8 43.7    248   MCV 90 91   MCH 28.7 28.2   MCHC 31.9* 31.1*       CMP:   Recent Labs     01/16/23  0422 01/17/23  0409   * 137   K 3.8 3.8     106   CO2 20* 20*   BUN 4* 3*   CREATININE 0.7 0.6   GLU 93 103   CALCIUM 9.1 9.0   ALBUMIN 2.9* 3.0*   PROT 6.6 6.9   ALKPHOS 161* 168*   * 713*   * 215*   BILITOT 5.4* 5.5*       INR  Recent Labs     23  1507   INR 1.1   APTT 25.8           Diagnostic Studies:      EKD Echo:  No results found for this or any previous visit.        Pre-op Assessment    I have reviewed the Patient Summary Reports.    I have reviewed the NPO Status.   I have reviewed the Medications.     Review of Systems  Anesthesia Hx:  No problems with previous Anesthesia   Denies Personal Hx of Anesthesia complications.   Cardiovascular:   Exercise tolerance: good    Pulmonary:  Pulmonary Normal    Hepatic/GI:   abd pain, nausea   Neurological:   Denies CVA. Denies Seizures.    Endocrine:  Morbid Obesity / BMI > 40      Physical Exam  General: Cooperative, Alert and Oriented    Airway:  Mallampati: III / II  Mouth Opening: Normal  TM Distance: Normal  Tongue: Normal  Neck ROM: Normal ROM    Dental:  Intact        Anesthesia Plan  Type of Anesthesia, risks & benefits discussed:    Anesthesia Type: Gen ETT  Intra-op Monitoring Plan: Standard ASA Monitors  Induction:  IV and rapid sequence  Airway Plan: Direct and Video, Post-Induction  Informed Consent: Informed consent signed with the Patient and all parties understand the risks and agree with anesthesia plan.  All questions answered.   ASA Score: 3  Day of Surgery Review of History & Physical: H&P Update referred to the surgeon/provider.    Ready For Surgery From Anesthesia Perspective.     .

## 2023-01-17 NOTE — SUBJECTIVE & OBJECTIVE
Interval History: EUS/ERCP today  Afebrile    Review of Systems  Objective:     Vital Signs (Most Recent):  Temp: 97.7 °F (36.5 °C) (01/17/23 1315)  Pulse: 87 (01/17/23 1315)  Resp: 20 (01/17/23 1315)  BP: 112/74 (01/17/23 1315)  SpO2: 98 % (01/17/23 1318) Vital Signs (24h Range):  Temp:  [97 °F (36.1 °C)-98.4 °F (36.9 °C)] 97.7 °F (36.5 °C)  Pulse:  [68-93] 87  Resp:  [16-20] 20  SpO2:  [95 %-100 %] 98 %  BP: (112-153)/(65-79) 112/74     Weight: 121.3 kg (267 lb 6.7 oz)  Body mass index is 41.88 kg/m².    Intake/Output Summary (Last 24 hours) at 1/17/2023 1339  Last data filed at 1/17/2023 0545  Gross per 24 hour   Intake 2190.71 ml   Output --   Net 2190.71 ml      Physical Exam  Constitutional:       General: She is not in acute distress.     Appearance: Normal appearance. She is obese. She is not toxic-appearing or diaphoretic.   HENT:      Head: Normocephalic and atraumatic.   Cardiovascular:      Rate and Rhythm: Normal rate and regular rhythm.      Heart sounds: No murmur heard.    No friction rub. No gallop.   Pulmonary:      Effort: Pulmonary effort is normal. No respiratory distress.      Breath sounds: Normal breath sounds. No wheezing or rales.   Abdominal:      General: Abdomen is flat. There is no distension.      Palpations: Abdomen is soft.      Tenderness: There is abdominal tenderness. There is no guarding or rebound.   Musculoskeletal:      Right lower leg: No edema.      Left lower leg: No edema.   Skin:     General: Skin is warm and dry.   Neurological:      General: No focal deficit present.      Mental Status: She is alert and oriented to person, place, and time.       MELD-Na score: 18 at 1/16/2023  4:22 AM  MELD score: 14 at 1/16/2023  4:22 AM  Calculated from:  Serum Creatinine: 0.7 mg/dL (Using min of 1 mg/dL) at 1/16/2023  4:22 AM  Serum Sodium: 132 mmol/L at 1/16/2023  4:22 AM  Total Bilirubin: 5.4 mg/dL at 1/16/2023  4:22 AM  INR(ratio): 1.1 at 1/14/2023  3:07 PM  Age: 47  years    Significant Labs:  CBC:  Recent Labs   Lab 01/16/23  0422 01/17/23  0409   WBC 5.59 5.38   HGB 12.7 13.6   HCT 39.8 43.7    248     CMP:  Recent Labs   Lab 01/16/23  0422 01/17/23  0409   * 137   K 3.8 3.8    106   CO2 20* 20*   GLU 93 103   BUN 4* 3*   CREATININE 0.7 0.6   CALCIUM 9.1 9.0   PROT 6.6 6.9   ALBUMIN 2.9* 3.0*   BILITOT 5.4* 5.5*   ALKPHOS 161* 168*   * 215*   * 713*   ANIONGAP 9 11     PTINR:  No results for input(s): INR in the last 48 hours.    Significant Procedures:   Dobutamine Stress Test with Color Flow: No results found for this or any previous visit.

## 2023-01-17 NOTE — ASSESSMENT & PLAN NOTE
Recommendations:  - EUS +/- ERCP today  - NPO in preparation for procedure  - CBC and CMP daily  - Appreciate General Surgery recommendations

## 2023-01-17 NOTE — PROGRESS NOTES
[facility]  Advanced Endoscopy   Progress Note    Patient Name: Brooke Moss  MRN: [id number]  Admission Date: 1/14/2023  Hospital Length of Stay: 3 days  Code Status: Full Code   Attending Provider: Saji Reyes*  Consulting Provider: Patsy Senior NP  Primary Care Physician: Primary Doctor No  Principal Problem: Right upper quadrant abdominal pain      Subjective:     Interval History: No adverse events overnight.Patient c/o RUQ pain with mild nausea. She denies emesis, diarrhea, hematochezia, and hematemesis. No improvement of Tbili or ALP overnight. Patient remains afebrile and HDS.     Review of Systems   Constitutional:  Negative for chills, diaphoresis and fever.   Eyes:  Negative for discharge and redness.   Gastrointestinal:  Positive for abdominal pain and nausea. Negative for abdominal distention, blood in stool, diarrhea and vomiting.   Skin:  Negative for color change and pallor.   Neurological:  Negative for syncope and speech difficulty.   Objective:     Vital Signs (Most Recent):  Temp: 98.2 °F (36.8 °C) (01/17/23 1050)  Pulse: 70 (01/17/23 1050)  Resp: 16 (01/17/23 1050)  BP: 127/78 (01/17/23 1050)  SpO2: 97 % (01/17/23 1050) Vital Signs (24h Range):  Temp:  [97 °F (36.1 °C)-98.4 °F (36.9 °C)] 98.2 °F (36.8 °C)  Pulse:  [66-93] 70  Resp:  [16-20] 16  SpO2:  [95 %-100 %] 97 %  BP: (120-153)/(65-84) 127/78     Weight: 121.3 kg (267 lb 6.7 oz) (01/15/23 0710)  Body mass index is 41.88 kg/m².      Intake/Output Summary (Last 24 hours) at 1/17/2023 1055  Last data filed at 1/17/2023 0545  Gross per 24 hour   Intake 2190.71 ml   Output --   Net 2190.71 ml       Lines/Drains/Airways       Peripheral Intravenous Line  Duration                  Peripheral IV - Single Lumen 01/14/23 1239 20 G Right Antecubital 2 days         Peripheral IV - Single Lumen 01/14/23 2128 Anterior;Distal;Right Forearm 2 days                    Physical Exam  Vitals and nursing note reviewed.    Constitutional:       General: She is not in acute distress.     Appearance: She is obese. She is not toxic-appearing.   Eyes:      General: No scleral icterus.        Right eye: No discharge.         Left eye: No discharge.   Abdominal:      General: Abdomen is flat. Bowel sounds are normal. There is no distension.      Palpations: Abdomen is soft.      Tenderness: There is abdominal tenderness in the right upper quadrant. There is no guarding. Positive signs include Shell's sign.   Skin:     General: Skin is warm and dry.      Coloration: Skin is not jaundiced or pale.   Neurological:      Mental Status: She is alert and oriented to person, place, and time.       Significant Labs:  CBC:   Recent Labs   Lab 01/16/23  0422 01/17/23  0409   WBC 5.59 5.38   HGB 12.7 13.6   HCT 39.8 43.7    248     CMP:   Recent Labs   Lab 01/17/23  0409      CALCIUM 9.0   ALBUMIN 3.0*   PROT 6.9      K 3.8   CO2 20*      BUN 3*   CREATININE 0.6   ALKPHOS 168*   *   *   BILITOT 5.5*     Coagulation: No results for input(s): PT, INR, APTT in the last 48 hours.      Significant Imaging:  Imaging results within the past 24 hours have been reviewed.    Assessment/Plan:     Hyperbilirubinemia  Recommendations:  - EUS +/- ERCP today  - NPO in preparation for procedure  - CBC and CMP daily  - Appreciate General Surgery recommendations      Thank you for your consult. I will follow-up with patient. Please contact us if you have any additional questions.    Patsy Senior NP  Gastroenterology  [facility]

## 2023-01-17 NOTE — TREATMENT PLAN
Brief GI treatment plan    EUS/ERCP performed today.  Findings:      EUS  Impression:            - One stone was visualized endosonographically in                          the lower third of the main bile duct with                          upstream bile duct dilation to 9mm and extensive                          biliary sludge seen throughout the main duct and                          gallbladder.                          - Hyperechoic material consistent with sludge was                          visualized endosonographically in the entire main                          bile duct and in the gallbladder.                          - Gallstones seen within gallbladder.                          - There was no sign of significant pathology in                          the main pancreatic duct.                          - Mild fatty replacement of pancreas.                          - Echogenic liver suggestive of fatty liver.                          - No specimens collected.   Recommendation:        - Perform an ERCP today.                          - Return patient to hospital cloud for ongoing care.                          - Refer to a surgeon for cholecystectomy.    ERCP  Impression:            - The upper third of the main bile duct and middle                          third of the main bile duct were moderately                          dilated, with a distal CBD stone causing an                          obstruction.                          - Choledocholithiasis and biliary sludge were                          found. Complete removal was accomplished by                          biliary sphincterotomy and balloon extraction.                          - A biliary sphincterotomy was performed.                          - The biliary tree was swept several times with                          clearance of the duct achieved. Significant amount                          of sludge and all stones were removed by end of                           procedure/                          - Complete clearance of duct confirmed on balloon                          occlusion cholangiogram and several balloon sweeps                          of the duct.                          - Indomethacin given to decrease risk of post-ERCP                          pancreatitis.   Recommendation:        - Return patient to hospital cloud for ongoing care.                          - Resume previous diet.                          - Continue present medications.                          - Watch for pancreatitis, bleeding, perforation,                          and cholangitis.                          - Refer to Surgery to plan cholecystectomy.         Please see full endoscopy report for details.    Recommendations:    -monitor for post-procedure pancreatitis, cholangitis  -cholecystectomy per general surgery  -trend CBC, CMP, INR      GI will continue to follow.  Please call questions.    Manuel Bain MD  GI Fellow

## 2023-01-17 NOTE — PROGRESS NOTES
[facility] ([facility])  [facility] Medicine  Progress Note    Patient Name: Brooke Moss  MRN: [id number]  Patient Class: IP- Inpatient   Admission Date: 1/14/2023  Length of Stay: 3 days  Attending Physician: Saji Reyes*  Primary Care Provider: Primary Doctor No        Subjective:     Principal Problem:Right upper quadrant abdominal pain        HPI:  Brooke Moss is a 47 y.o. female who presents with abdominal pain. Patient states that pain woke her up from sleep on Friday and was RUQ and sharp, with radiations to back. She was diaphoretic and felt chills. It persisted throughout the day and then she started having nausea and vomiting. Reported vomiting ~12 times, unable to tolerate anything po. Did not see any blood. No changes in bowel habits. After vomiting so much, she started to have burning epigastric pain but the abdominal pain did improve a little bit, and she was able to sleep some Friday night. However, pain returned this morning and she presented to ER. She denies having had fever, chest pain, shortness of breath, urinary changes, lower extremity edema. She reports social EtOH use, with no recent increase in consumption, no changes in foods. She reports her sister recently had her gallbladder removed. Denies previous history of similar pain.       Overview/Hospital Course:  In the Ed, patient was afebrile and hemodynamically stable. Labs with Tb elevated 6.1, , ALT 1212, lipase wnl. Ultrasound with cholelithiasis and gallbladder sludge with upper limits of normal wall thickness and reportedly positive sonographic Shell sign concerning for acute cholecystitis, although no pericholecystic fluid or wall hyperemia at this time.  There is suspected superimposed gallbladder adenomyomatosis. General surgery and AES consulted. Patient was admitted to hospital medicine. MRCP with cholelithiasis, biliary sludge, and gallbladder adenomyomatosis.  No evidence of  choledocholithiasis. Mild dilatation of the extra hepatic common bile duct, similar to findings on recent ultrasound. EUS/ERCP 1/17      Interval History: EUS/ERCP today  Afebrile    Review of Systems  Objective:     Vital Signs (Most Recent):  Temp: 97.7 °F (36.5 °C) (01/17/23 1315)  Pulse: 87 (01/17/23 1315)  Resp: 20 (01/17/23 1315)  BP: 112/74 (01/17/23 1315)  SpO2: 98 % (01/17/23 1318) Vital Signs (24h Range):  Temp:  [97 °F (36.1 °C)-98.4 °F (36.9 °C)] 97.7 °F (36.5 °C)  Pulse:  [68-93] 87  Resp:  [16-20] 20  SpO2:  [95 %-100 %] 98 %  BP: (112-153)/(65-79) 112/74     Weight: 121.3 kg (267 lb 6.7 oz)  Body mass index is 41.88 kg/m².    Intake/Output Summary (Last 24 hours) at 1/17/2023 1339  Last data filed at 1/17/2023 0545  Gross per 24 hour   Intake 2190.71 ml   Output --   Net 2190.71 ml      Physical Exam  Constitutional:       General: She is not in acute distress.     Appearance: Normal appearance. She is obese. She is not toxic-appearing or diaphoretic.   HENT:      Head: Normocephalic and atraumatic.   Cardiovascular:      Rate and Rhythm: Normal rate and regular rhythm.      Heart sounds: No murmur heard.    No friction rub. No gallop.   Pulmonary:      Effort: Pulmonary effort is normal. No respiratory distress.      Breath sounds: Normal breath sounds. No wheezing or rales.   Abdominal:      General: Abdomen is flat. There is no distension.      Palpations: Abdomen is soft.      Tenderness: There is abdominal tenderness. There is no guarding or rebound.   Musculoskeletal:      Right lower leg: No edema.      Left lower leg: No edema.   Skin:     General: Skin is warm and dry.   Neurological:      General: No focal deficit present.      Mental Status: She is alert and oriented to person, place, and time.       MELD-Na score: 18 at 1/16/2023  4:22 AM  MELD score: 14 at 1/16/2023  4:22 AM  Calculated from:  Serum Creatinine: 0.7 mg/dL (Using min of 1 mg/dL) at 1/16/2023  4:22 AM  Serum Sodium: 132  mmol/L at 1/16/2023  4:22 AM  Total Bilirubin: 5.4 mg/dL at 1/16/2023  4:22 AM  INR(ratio): 1.1 at 1/14/2023  3:07 PM  Age: 47 years    Significant Labs:  CBC:  Recent Labs   Lab 01/16/23  0422 01/17/23  0409   WBC 5.59 5.38   HGB 12.7 13.6   HCT 39.8 43.7    248     CMP:  Recent Labs   Lab 01/16/23  0422 01/17/23  0409   * 137   K 3.8 3.8    106   CO2 20* 20*   GLU 93 103   BUN 4* 3*   CREATININE 0.7 0.6   CALCIUM 9.1 9.0   PROT 6.6 6.9   ALBUMIN 2.9* 3.0*   BILITOT 5.4* 5.5*   ALKPHOS 161* 168*   * 215*   * 713*   ANIONGAP 9 11     PTINR:  No results for input(s): INR in the last 48 hours.    Significant Procedures:   Dobutamine Stress Test with Color Flow: No results found for this or any previous visit.      Assessment/Plan:      * Right upper quadrant abdominal pain  47 y.o. female who presents with RUQ abdominal pain, nausea and vomiting.  - afebrile and hemodynamically stable.  - Labs with Tb elevated 6.1, , ALT 1212, lipase wnl.   - no leukocytosis, afebrile  - Ultrasound with cholelithiasis and gallbladder sludge with upper limits of normal wall thickness and reportedly positive sonographic Shell sign concerning for acute cholecystitis, although no pericholecystic fluid or wall hyperemia at this time.  There is suspected superimposed gallbladder adenomyomatosis.   - Admitted to hospital medicine  - IVF, Pain control with morphine  - MRCP with cholelithiasis, biliary sludge, and gallbladder adenomyomatosis.  No evidence of choledocholithiasis. Mild dilatation of the extra hepatic common bile duct, similar to findings on recent ultrasound.   - General surgery and AES consulted.      VTE Risk Mitigation (From admission, onward)         Ordered     IP VTE HIGH RISK PATIENT  Once         01/14/23 1642     Place sequential compression device  Until discontinued         01/14/23 1642                Discharge Planning   NARAYAN: 1/18/2023     Code Status: Full Code   Is the  patient medically ready for discharge?:     Reason for patient still in hospital (select all that apply): Patient trending condition and Treatment  Discharge Plan A: Home   Discharge Delays: (!) Procedure Scheduling (IR, OR, Labs, Echo, Cath, Echo, EEG)      Saji Medina MD  Department of [facility] Medicine   [facility] Surgery (2nd Fl)

## 2023-01-18 ENCOUNTER — ANESTHESIA EVENT (OUTPATIENT)
Dept: SURGERY | Facility: HOSPITAL | Age: 48
DRG: 418 | End: 2023-01-18
Payer: COMMERCIAL

## 2023-01-18 ENCOUNTER — ANESTHESIA (OUTPATIENT)
Dept: SURGERY | Facility: HOSPITAL | Age: 48
DRG: 418 | End: 2023-01-18
Payer: COMMERCIAL

## 2023-01-18 PROBLEM — K80.20 CALCULUS OF GALLBLADDER WITHOUT CHOLECYSTITIS: Status: ACTIVE | Noted: 2023-01-18

## 2023-01-18 PROBLEM — R74.01 ELEVATED LIVER TRANSAMINASE LEVEL: Status: ACTIVE | Noted: 2023-01-18

## 2023-01-18 PROBLEM — K80.51 CHOLEDOCHOLITHIASIS WITH OBSTRUCTION: Status: ACTIVE | Noted: 2023-01-18

## 2023-01-18 LAB
ALBUMIN SERPL BCP-MCNC: 3.1 G/DL (ref 3.5–5.2)
ALP SERPL-CCNC: 188 U/L (ref 55–135)
ALT SERPL W/O P-5'-P-CCNC: 563 U/L (ref 10–44)
ANION GAP SERPL CALC-SCNC: 12 MMOL/L (ref 8–16)
AST SERPL-CCNC: 124 U/L (ref 10–40)
B-HCG UR QL: NEGATIVE
BASOPHILS # BLD AUTO: 0.04 K/UL (ref 0–0.2)
BASOPHILS NFR BLD: 0.5 % (ref 0–1.9)
BILIRUB SERPL-MCNC: 2.5 MG/DL (ref 0.1–1)
BUN SERPL-MCNC: 7 MG/DL (ref 6–20)
CALCIUM SERPL-MCNC: 9.2 MG/DL (ref 8.7–10.5)
CHLORIDE SERPL-SCNC: 108 MMOL/L (ref 95–110)
CO2 SERPL-SCNC: 19 MMOL/L (ref 23–29)
CREAT SERPL-MCNC: 0.8 MG/DL (ref 0.5–1.4)
CTP QC/QA: YES
DIFFERENTIAL METHOD: NORMAL
EOSINOPHIL # BLD AUTO: 0.2 K/UL (ref 0–0.5)
EOSINOPHIL NFR BLD: 2 % (ref 0–8)
ERYTHROCYTE [DISTWIDTH] IN BLOOD BY AUTOMATED COUNT: 14.1 % (ref 11.5–14.5)
EST. GFR  (NO RACE VARIABLE): >60 ML/MIN/1.73 M^2
GLUCOSE SERPL-MCNC: 99 MG/DL (ref 70–110)
HCT VFR BLD AUTO: 43.7 % (ref 37–48.5)
HGB BLD-MCNC: 14.1 G/DL (ref 12–16)
IMM GRANULOCYTES # BLD AUTO: 0.03 K/UL (ref 0–0.04)
IMM GRANULOCYTES NFR BLD AUTO: 0.4 % (ref 0–0.5)
LYMPHOCYTES # BLD AUTO: 1.9 K/UL (ref 1–4.8)
LYMPHOCYTES NFR BLD: 23.6 % (ref 18–48)
MCH RBC QN AUTO: 28.7 PG (ref 27–31)
MCHC RBC AUTO-ENTMCNC: 32.3 G/DL (ref 32–36)
MCV RBC AUTO: 89 FL (ref 82–98)
MONOCYTES # BLD AUTO: 0.7 K/UL (ref 0.3–1)
MONOCYTES NFR BLD: 8.4 % (ref 4–15)
NEUTROPHILS # BLD AUTO: 5.1 K/UL (ref 1.8–7.7)
NEUTROPHILS NFR BLD: 65.1 % (ref 38–73)
NRBC BLD-RTO: 0 /100 WBC
PLATELET # BLD AUTO: 262 K/UL (ref 150–450)
PMV BLD AUTO: 10.6 FL (ref 9.2–12.9)
POTASSIUM SERPL-SCNC: 3.6 MMOL/L (ref 3.5–5.1)
PROT SERPL-MCNC: 7.2 G/DL (ref 6–8.4)
RBC # BLD AUTO: 4.91 M/UL (ref 4–5.4)
SODIUM SERPL-SCNC: 139 MMOL/L (ref 136–145)
WBC # BLD AUTO: 7.83 K/UL (ref 3.9–12.7)

## 2023-01-18 PROCEDURE — 36000708 HC OR TIME LEV III 1ST 15 MIN: Performed by: STUDENT IN AN ORGANIZED HEALTH CARE EDUCATION/TRAINING PROGRAM

## 2023-01-18 PROCEDURE — 81025 URINE PREGNANCY TEST: CPT | Performed by: STUDENT IN AN ORGANIZED HEALTH CARE EDUCATION/TRAINING PROGRAM

## 2023-01-18 PROCEDURE — 99233 SBSQ HOSP IP/OBS HIGH 50: CPT | Mod: ,,, | Performed by: STUDENT IN AN ORGANIZED HEALTH CARE EDUCATION/TRAINING PROGRAM

## 2023-01-18 PROCEDURE — D9220A PRA ANESTHESIA: ICD-10-PCS | Mod: CRNA,,, | Performed by: NURSE ANESTHETIST, CERTIFIED REGISTERED

## 2023-01-18 PROCEDURE — 63600175 PHARM REV CODE 636 W HCPCS: Performed by: ANESTHESIOLOGY

## 2023-01-18 PROCEDURE — 80053 COMPREHEN METABOLIC PANEL: CPT | Performed by: STUDENT IN AN ORGANIZED HEALTH CARE EDUCATION/TRAINING PROGRAM

## 2023-01-18 PROCEDURE — 25000003 PHARM REV CODE 250: Performed by: NURSE ANESTHETIST, CERTIFIED REGISTERED

## 2023-01-18 PROCEDURE — 37000008 HC ANESTHESIA 1ST 15 MINUTES: Performed by: STUDENT IN AN ORGANIZED HEALTH CARE EDUCATION/TRAINING PROGRAM

## 2023-01-18 PROCEDURE — 99232 PR SUBSEQUENT HOSPITAL CARE,LEVL II: ICD-10-PCS | Mod: ,,, | Performed by: STUDENT IN AN ORGANIZED HEALTH CARE EDUCATION/TRAINING PROGRAM

## 2023-01-18 PROCEDURE — 63600175 PHARM REV CODE 636 W HCPCS: Performed by: NURSE ANESTHETIST, CERTIFIED REGISTERED

## 2023-01-18 PROCEDURE — 25000003 PHARM REV CODE 250: Performed by: STUDENT IN AN ORGANIZED HEALTH CARE EDUCATION/TRAINING PROGRAM

## 2023-01-18 PROCEDURE — D9220A PRA ANESTHESIA: Mod: CRNA,,, | Performed by: NURSE ANESTHETIST, CERTIFIED REGISTERED

## 2023-01-18 PROCEDURE — 63600175 PHARM REV CODE 636 W HCPCS: Performed by: STUDENT IN AN ORGANIZED HEALTH CARE EDUCATION/TRAINING PROGRAM

## 2023-01-18 PROCEDURE — 20600001 HC STEP DOWN PRIVATE ROOM

## 2023-01-18 PROCEDURE — 47562 PR LAP,CHOLECYSTECTOMY: ICD-10-PCS | Mod: ,,, | Performed by: STUDENT IN AN ORGANIZED HEALTH CARE EDUCATION/TRAINING PROGRAM

## 2023-01-18 PROCEDURE — 88304 TISSUE EXAM BY PATHOLOGIST: CPT | Mod: 26,,, | Performed by: PATHOLOGY

## 2023-01-18 PROCEDURE — D9220A PRA ANESTHESIA: Mod: ANES,,, | Performed by: ANESTHESIOLOGY

## 2023-01-18 PROCEDURE — 37000009 HC ANESTHESIA EA ADD 15 MINS: Performed by: STUDENT IN AN ORGANIZED HEALTH CARE EDUCATION/TRAINING PROGRAM

## 2023-01-18 PROCEDURE — 36415 COLL VENOUS BLD VENIPUNCTURE: CPT | Performed by: STUDENT IN AN ORGANIZED HEALTH CARE EDUCATION/TRAINING PROGRAM

## 2023-01-18 PROCEDURE — 36000709 HC OR TIME LEV III EA ADD 15 MIN: Performed by: STUDENT IN AN ORGANIZED HEALTH CARE EDUCATION/TRAINING PROGRAM

## 2023-01-18 PROCEDURE — 27201423 OPTIME MED/SURG SUP & DEVICES STERILE SUPPLY: Performed by: STUDENT IN AN ORGANIZED HEALTH CARE EDUCATION/TRAINING PROGRAM

## 2023-01-18 PROCEDURE — 25000242 PHARM REV CODE 250 ALT 637 W/ HCPCS: Performed by: NURSE ANESTHETIST, CERTIFIED REGISTERED

## 2023-01-18 PROCEDURE — D9220A PRA ANESTHESIA: ICD-10-PCS | Mod: ANES,,, | Performed by: ANESTHESIOLOGY

## 2023-01-18 PROCEDURE — 71000033 HC RECOVERY, INTIAL HOUR: Performed by: STUDENT IN AN ORGANIZED HEALTH CARE EDUCATION/TRAINING PROGRAM

## 2023-01-18 PROCEDURE — 47562 LAPAROSCOPIC CHOLECYSTECTOMY: CPT | Mod: ,,, | Performed by: STUDENT IN AN ORGANIZED HEALTH CARE EDUCATION/TRAINING PROGRAM

## 2023-01-18 PROCEDURE — 99232 SBSQ HOSP IP/OBS MODERATE 35: CPT | Mod: ,,, | Performed by: STUDENT IN AN ORGANIZED HEALTH CARE EDUCATION/TRAINING PROGRAM

## 2023-01-18 PROCEDURE — 94761 N-INVAS EAR/PLS OXIMETRY MLT: CPT

## 2023-01-18 PROCEDURE — 88304 PR  SURG PATH,LEVEL III: ICD-10-PCS | Mod: 26,,, | Performed by: PATHOLOGY

## 2023-01-18 PROCEDURE — 85025 COMPLETE CBC W/AUTO DIFF WBC: CPT | Performed by: STUDENT IN AN ORGANIZED HEALTH CARE EDUCATION/TRAINING PROGRAM

## 2023-01-18 PROCEDURE — 71000015 HC POSTOP RECOV 1ST HR: Performed by: STUDENT IN AN ORGANIZED HEALTH CARE EDUCATION/TRAINING PROGRAM

## 2023-01-18 PROCEDURE — 99233 PR SUBSEQUENT HOSPITAL CARE,LEVL III: ICD-10-PCS | Mod: ,,, | Performed by: STUDENT IN AN ORGANIZED HEALTH CARE EDUCATION/TRAINING PROGRAM

## 2023-01-18 PROCEDURE — 88304 TISSUE EXAM BY PATHOLOGIST: CPT | Performed by: PATHOLOGY

## 2023-01-18 RX ORDER — CEFAZOLIN SODIUM 1 G/3ML
INJECTION, POWDER, FOR SOLUTION INTRAMUSCULAR; INTRAVENOUS
Status: DISCONTINUED | OUTPATIENT
Start: 2023-01-18 | End: 2023-01-18

## 2023-01-18 RX ORDER — FENTANYL CITRATE 50 UG/ML
25 INJECTION, SOLUTION INTRAMUSCULAR; INTRAVENOUS EVERY 5 MIN PRN
Status: COMPLETED | OUTPATIENT
Start: 2023-01-18 | End: 2023-01-18

## 2023-01-18 RX ORDER — ROCURONIUM BROMIDE 10 MG/ML
INJECTION, SOLUTION INTRAVENOUS
Status: DISCONTINUED | OUTPATIENT
Start: 2023-01-18 | End: 2023-01-18

## 2023-01-18 RX ORDER — ONDANSETRON 2 MG/ML
4 INJECTION INTRAMUSCULAR; INTRAVENOUS DAILY PRN
Status: DISCONTINUED | OUTPATIENT
Start: 2023-01-18 | End: 2023-01-18 | Stop reason: HOSPADM

## 2023-01-18 RX ORDER — PHENYLEPHRINE HYDROCHLORIDE 10 MG/ML
INJECTION INTRAVENOUS
Status: DISCONTINUED | OUTPATIENT
Start: 2023-01-18 | End: 2023-01-18

## 2023-01-18 RX ORDER — FENTANYL CITRATE 50 UG/ML
INJECTION, SOLUTION INTRAMUSCULAR; INTRAVENOUS
Status: DISCONTINUED | OUTPATIENT
Start: 2023-01-18 | End: 2023-01-18

## 2023-01-18 RX ORDER — ALBUTEROL SULFATE 90 UG/1
AEROSOL, METERED RESPIRATORY (INHALATION)
Status: DISCONTINUED | OUTPATIENT
Start: 2023-01-18 | End: 2023-01-18

## 2023-01-18 RX ORDER — MIDAZOLAM HYDROCHLORIDE 1 MG/ML
INJECTION, SOLUTION INTRAMUSCULAR; INTRAVENOUS
Status: DISCONTINUED | OUTPATIENT
Start: 2023-01-18 | End: 2023-01-18

## 2023-01-18 RX ORDER — ONDANSETRON 2 MG/ML
INJECTION INTRAMUSCULAR; INTRAVENOUS
Status: DISCONTINUED | OUTPATIENT
Start: 2023-01-18 | End: 2023-01-18

## 2023-01-18 RX ORDER — PROPOFOL 10 MG/ML
VIAL (ML) INTRAVENOUS
Status: DISCONTINUED | OUTPATIENT
Start: 2023-01-18 | End: 2023-01-18

## 2023-01-18 RX ORDER — BUPIVACAINE HYDROCHLORIDE 2.5 MG/ML
INJECTION, SOLUTION EPIDURAL; INFILTRATION; INTRACAUDAL
Status: DISCONTINUED | OUTPATIENT
Start: 2023-01-18 | End: 2023-01-18 | Stop reason: HOSPADM

## 2023-01-18 RX ORDER — DEXAMETHASONE SODIUM PHOSPHATE 4 MG/ML
INJECTION, SOLUTION INTRA-ARTICULAR; INTRALESIONAL; INTRAMUSCULAR; INTRAVENOUS; SOFT TISSUE
Status: DISCONTINUED | OUTPATIENT
Start: 2023-01-18 | End: 2023-01-18

## 2023-01-18 RX ORDER — LIDOCAINE HYDROCHLORIDE 20 MG/ML
INJECTION INTRAVENOUS
Status: DISCONTINUED | OUTPATIENT
Start: 2023-01-18 | End: 2023-01-18

## 2023-01-18 RX ADMIN — GLYCOPYRROLATE 0.2 MG: 0.2 INJECTION, SOLUTION INTRAMUSCULAR; INTRAVENOUS at 02:01

## 2023-01-18 RX ADMIN — ALBUTEROL SULFATE 8 PUFF: 108 INHALANT RESPIRATORY (INHALATION) at 03:01

## 2023-01-18 RX ADMIN — FENTANYL CITRATE 100 MCG: 50 INJECTION, SOLUTION INTRAMUSCULAR; INTRAVENOUS at 02:01

## 2023-01-18 RX ADMIN — PHENYLEPHRINE HYDROCHLORIDE 100 MCG: 10 INJECTION INTRAVENOUS at 02:01

## 2023-01-18 RX ADMIN — ONDANSETRON 4 MG: 2 INJECTION INTRAMUSCULAR; INTRAVENOUS at 02:01

## 2023-01-18 RX ADMIN — LIDOCAINE HYDROCHLORIDE 100 MG: 20 INJECTION INTRAVENOUS at 02:01

## 2023-01-18 RX ADMIN — FENTANYL CITRATE 25 MCG: 50 INJECTION, SOLUTION INTRAMUSCULAR; INTRAVENOUS at 04:01

## 2023-01-18 RX ADMIN — SUGAMMADEX 200 MG: 100 INJECTION, SOLUTION INTRAVENOUS at 03:01

## 2023-01-18 RX ADMIN — SODIUM CHLORIDE: 0.9 INJECTION, SOLUTION INTRAVENOUS at 02:01

## 2023-01-18 RX ADMIN — SODIUM CHLORIDE, POTASSIUM CHLORIDE, SODIUM LACTATE AND CALCIUM CHLORIDE: 600; 310; 30; 20 INJECTION, SOLUTION INTRAVENOUS at 11:01

## 2023-01-18 RX ADMIN — MIDAZOLAM HYDROCHLORIDE 2 MG: 1 INJECTION, SOLUTION INTRAMUSCULAR; INTRAVENOUS at 02:01

## 2023-01-18 RX ADMIN — PHENYLEPHRINE HYDROCHLORIDE 100 MCG: 10 INJECTION INTRAVENOUS at 03:01

## 2023-01-18 RX ADMIN — SODIUM CHLORIDE, POTASSIUM CHLORIDE, SODIUM LACTATE AND CALCIUM CHLORIDE: 600; 310; 30; 20 INJECTION, SOLUTION INTRAVENOUS at 01:01

## 2023-01-18 RX ADMIN — ROCURONIUM BROMIDE 50 MG: 10 INJECTION INTRAVENOUS at 02:01

## 2023-01-18 RX ADMIN — DEXAMETHASONE SODIUM PHOSPHATE 4 MG: 4 INJECTION, SOLUTION INTRAMUSCULAR; INTRAVENOUS at 02:01

## 2023-01-18 RX ADMIN — CEFAZOLIN 3 G: 330 INJECTION, POWDER, FOR SOLUTION INTRAMUSCULAR; INTRAVENOUS at 02:01

## 2023-01-18 RX ADMIN — PROPOFOL 200 MG: 10 INJECTION, EMULSION INTRAVENOUS at 02:01

## 2023-01-18 RX ADMIN — SODIUM CHLORIDE, SODIUM GLUCONATE, SODIUM ACETATE, POTASSIUM CHLORIDE, MAGNESIUM CHLORIDE, SODIUM PHOSPHATE, DIBASIC, AND POTASSIUM PHOSPHATE: .53; .5; .37; .037; .03; .012; .00082 INJECTION, SOLUTION INTRAVENOUS at 03:01

## 2023-01-18 RX ADMIN — MORPHINE SULFATE 4 MG: 4 INJECTION INTRAVENOUS at 06:01

## 2023-01-18 NOTE — TRANSFER OF CARE
"Anesthesia Transfer of Care Note    Patient: Brooke Moss    Procedure(s) Performed: Procedure(s) (LRB):  CHOLECYSTECTOMY, LAPAROSCOPIC (N/A)    Patient location: PACU    Anesthesia Type: general    Transport from OR: Transported from OR on 6-10 L/min O2 by face mask with adequate spontaneous ventilation    Post pain: adequate analgesia    Post assessment: no apparent anesthetic complications    Post vital signs: stable    Level of consciousness: awake    Nausea/Vomiting: no nausea/vomiting    Complications: none    Transfer of care protocol was followed      Last vitals:   Visit Vitals  /69 (BP Location: Left arm, Patient Position: Lying)   Pulse 72   Temp 36.7 °C (98 °F) (Oral)   Resp 18   Ht 5' 7" (1.702 m)   Wt 121.3 kg (267 lb 6.7 oz)   SpO2 95%   Breastfeeding No   BMI 41.88 kg/m²     "

## 2023-01-18 NOTE — SUBJECTIVE & OBJECTIVE
Subjective:     Interval History: 1 day s/p EUS + ERCP with removal of 1 distal CBD stone. Tbili has subsequently improved. No adverse events overnight. Afebrile and HDS.     Objective:     Vital Signs (Most Recent):  Temp: 98.1 °F (36.7 °C) (01/18/23 0737)  Pulse: 88 (01/18/23 0737)  Resp: 18 (01/18/23 0737)  BP: 128/65 (01/18/23 0737)  SpO2: 96 % (01/18/23 0737) Vital Signs (24h Range):  Temp:  [97.5 °F (36.4 °C)-98.2 °F (36.8 °C)] 98.1 °F (36.7 °C)  Pulse:  [66-88] 88  Resp:  [10-20] 18  SpO2:  [93 %-98 %] 96 %  BP: (112-132)/(61-80) 128/65     Weight: 121.3 kg (267 lb 6.7 oz) (01/15/23 0710)  Body mass index is 41.88 kg/m².      Intake/Output Summary (Last 24 hours) at 1/18/2023 1030  Last data filed at 1/18/2023 0527  Gross per 24 hour   Intake 1713.1 ml   Output --   Net 1713.1 ml       Lines/Drains/Airways       Peripheral Intravenous Line  Duration                  Peripheral IV - Single Lumen 01/17/23 1629 20 G Anterior;Right Forearm <1 day                  Significant Labs:  CBC:   Recent Labs   Lab 01/17/23  0409 01/18/23  0814   WBC 5.38 7.83   HGB 13.6 14.1   HCT 43.7 43.7    262     CMP:   Recent Labs   Lab 01/18/23  0814   GLU 99   CALCIUM 9.2   ALBUMIN 3.1*   PROT 7.2      K 3.6   CO2 19*      BUN 7   CREATININE 0.8   ALKPHOS 188*   *   *   BILITOT 2.5*     Coagulation: No results for input(s): PT, INR, APTT in the last 48 hours.      Significant Imaging:  Imaging results within the past 24 hours have been reviewed.

## 2023-01-18 NOTE — ANESTHESIA PROCEDURE NOTES
Intubation    Date/Time: 1/18/2023 2:37 PM  Performed by: Adore Erickson CRNA  Authorized by: Rock Escamilla MD     Intubation:     Induction:  Intravenous    Intubated:  Postinduction    Mask Ventilation:  Easy mask    Attempts:  1    Attempted By:  CRNA    Method of Intubation:  Direct    Blade:  Martins 2    Laryngeal View Grade: Grade I - full view of cords      Difficult Airway Encountered?: No      Complications:  None    Airway Device:  Oral endotracheal tube    Airway Device Size:  7.5    Style/Cuff Inflation:  Cuffed (inflated to minimal occlusive pressure)    Inflation Amount (mL):  8    Tube secured:  21    Secured at:  The lips    Placement Verified By:  Capnometry    Complicating Factors:  None    Findings Post-Intubation:  BS equal bilateral and atraumatic/condition of teeth unchanged

## 2023-01-18 NOTE — PROGRESS NOTES
[facility]  General Surgery  Progress Note    Subjective:     History of Present Illness:  Brooke Moss is a 47 y.o. female presenting with RUQ pain on 1/14/22. Labwork significant for Tbili 6.1 and LFTs of 895/1212. No leukocytosis, but slight left shift of 76%. US demonstrated GB wall thickening from adenomyosis and an abundance of gallstones and sludge, however there was no visible intra or extrahepatic biliary ductal dilation. Follow up MRCP was performed to rule out choledocholithiasis, but no biliary obstruction was seen (no CBD dilation, no common duct stone, no Mirizzi syndrome). General surgery consulted for evaluation of possible cholecystitis.     Pt states that she was in her usual state of health until Friday. She had epigastric pain, that migrated to her RUQ and radiated through to her back. Several episodes of NBNB emesis, dehydrated upon presentation. Notes cola-colored urine today. Last BM this AM, normal in color and consistency. ED concerned for jaundice, but patient and her mother state that this is her normal complexion (she likes to tan outside). No scleral icterus nor yellowing of her mucosal membranes.     PSH: no prior abdominal surgeries. Tonillectomy as a child, did well with anesthesia.  Substance: socially drinks about twice a month (2-6 beers on those days)  Social: immigrated from [place]. 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       Post-Op Info:  Procedure(s) (LRB):  ULTRASOUND, UPPER GI TRACT, ENDOSCOPIC (N/A)  ERCP (ENDOSCOPIC RETROGRADE CHOLANGIOPANCREATOGRAPHY) (N/A)   1 Day Post-Op     Interval History: NAEON. Afebrile. HDS. Reports feeling better this AM. ERCP yesterday with choledocholithiasis found. Labs pending this AM. Pain is controlled.    Medications:  Continuous Infusions:   lactated ringers 100 mL/hr at 01/18/23 0527     Scheduled Meds:  PRN Meds:acetaminophen, dextrose 10%, dextrose 10%, glucagon (human recombinant), glucose, glucose,  morphine, naloxone, ondansetron, sodium chloride 0.9%     Review of patient's allergies indicates:   Allergen Reactions    Iodine and iodide containing products     Shellfish containing products      Objective:     Vital Signs (Most Recent):  Temp: 97.9 °F (36.6 °C) (01/18/23 0317)  Pulse: 74 (01/18/23 0317)  Resp: 18 (01/18/23 0317)  BP: 132/61 (01/18/23 0317)  SpO2: (!) 93 % (01/18/23 0317) Vital Signs (24h Range):  Temp:  [97.5 °F (36.4 °C)-98.2 °F (36.8 °C)] 97.9 °F (36.6 °C)  Pulse:  [66-87] 74  Resp:  [10-20] 18  SpO2:  [93 %-98 %] 93 %  BP: (112-132)/(61-80) 132/61     Weight: 121.3 kg (267 lb 6.7 oz)  Body mass index is 41.88 kg/m².    Intake/Output - Last 3 Shifts         01/16 0700  01/17 0659 01/17 0700  01/18 0659 01/18 0700  01/19 0659    P.O.       I.V. (mL/kg) 2190.7 (18.1) 1713.1 (14.1)     Total Intake(mL/kg) 2190.7 (18.1) 1713.1 (14.1)     Net +2190.7 +1713.1            Urine Occurrence  2 x             Physical Exam  Vitals and nursing note reviewed.   Constitutional:       General: She is not in acute distress.     Appearance: She is well-developed. She is obese. She is not ill-appearing.      Comments: Room air   HENT:      Head: Normocephalic and atraumatic.      Mouth/Throat:      Mouth: Mucous membranes are moist.      Pharynx: Oropharynx is clear.   Eyes:      Extraocular Movements: Extraocular movements intact.      Conjunctiva/sclera: Conjunctivae normal.   Cardiovascular:      Rate and Rhythm: Normal rate.   Pulmonary:      Effort: Pulmonary effort is normal. No respiratory distress.   Abdominal:      General: There is no distension.      Palpations: Abdomen is soft.      Tenderness: There is no abdominal tenderness. There is no guarding or rebound.      Comments: Abdomen is soft and non-distended. No surgical scars. Minimally tender in the RUQ and epigastrium. Not peritoneal.    Musculoskeletal:         General: No deformity.   Skin:     General: Skin is warm and dry.   Neurological:       Mental Status: She is alert and oriented to person, place, and time.       Significant Labs:  I have reviewed all pertinent lab results within the past 24 hours.  CBC:   Recent Labs   Lab 01/17/23  0409   WBC 5.38   RBC 4.82   HGB 13.6   HCT 43.7      MCV 91   MCH 28.2   MCHC 31.1*       CMP:   Recent Labs   Lab 01/17/23  0409      CALCIUM 9.0   ALBUMIN 3.0*   PROT 6.9      K 3.8   CO2 20*      BUN 3*   CREATININE 0.6   ALKPHOS 168*   *   *   BILITOT 5.5*         Significant Diagnostics:  I have reviewed all pertinent imaging results/findings within the past 24 hours.    Assessment/Plan:     * Right upper quadrant abdominal pain  Brooke Moss is a 48 y.o. female with acute onset RUQ pain, N/V. Tbili 6.1 and LFTs 800/1200 on admit. US shows GB adenomyosis and stones/sludge, MRCP does not demonstrate any ductal obstruction. No imaging evidence to support choledocholithiasis at the current time.  Hepatitis panel negative. Serum tylenol level normal.   Possible that she had an obstructing CBD stone that passed prior to imaging. S/p ERCP with choledocholithiasis found 1/17    - NPO  - Plan for OR today for CCY  - Will return for consent   - Trend T bili and LFTs  - Remainder of care per primary team  - Please contact general surgery with any questions, concerns, or clinical status changes      Case discussed with Dr. Romano.      MOE RashidC  General Surgery  [facility]

## 2023-01-18 NOTE — PROGRESS NOTES
S/p rachell lopez today  She is OK for discharge tonight if she wishes  We will arrange to see her in clinic in two weeks    HADLEY Kessler MD   General Surgery, PGY-5

## 2023-01-18 NOTE — PROGRESS NOTES
[facility]  Advanced Endoscopy   Progress Note    Patient Name: Brooke Moss  MRN: [id number]  Admission Date: 1/14/2023  Hospital Length of Stay: 4 days  Code Status: Full Code   Attending Provider: Saji Reyes*  Consulting Provider: Patsy Senior NP  Primary Care Physician: Primary Doctor No  Principal Problem: Right upper quadrant abdominal pain      Subjective:     Interval History: 1 day s/p EUS + ERCP with removal of 1 distal CBD stone. Tbili has subsequently improved. No adverse events overnight. Afebrile and HDS.     Objective:     Vital Signs (Most Recent):  Temp: 98.1 °F (36.7 °C) (01/18/23 0737)  Pulse: 88 (01/18/23 0737)  Resp: 18 (01/18/23 0737)  BP: 128/65 (01/18/23 0737)  SpO2: 96 % (01/18/23 0737) Vital Signs (24h Range):  Temp:  [97.5 °F (36.4 °C)-98.2 °F (36.8 °C)] 98.1 °F (36.7 °C)  Pulse:  [66-88] 88  Resp:  [10-20] 18  SpO2:  [93 %-98 %] 96 %  BP: (112-132)/(61-80) 128/65     Weight: 121.3 kg (267 lb 6.7 oz) (01/15/23 0710)  Body mass index is 41.88 kg/m².      Intake/Output Summary (Last 24 hours) at 1/18/2023 1030  Last data filed at 1/18/2023 0527  Gross per 24 hour   Intake 1713.1 ml   Output --   Net 1713.1 ml       Lines/Drains/Airways       Peripheral Intravenous Line  Duration                  Peripheral IV - Single Lumen 01/17/23 1629 20 G Anterior;Right Forearm <1 day                  Significant Labs:  CBC:   Recent Labs   Lab 01/17/23  0409 01/18/23  0814   WBC 5.38 7.83   HGB 13.6 14.1   HCT 43.7 43.7    262     CMP:   Recent Labs   Lab 01/18/23 0814   GLU 99   CALCIUM 9.2   ALBUMIN 3.1*   PROT 7.2      K 3.6   CO2 19*      BUN 7   CREATININE 0.8   ALKPHOS 188*   *   *   BILITOT 2.5*     Coagulation: No results for input(s): PT, INR, APTT in the last 48 hours.      Significant Imaging:  Imaging results within the past 24 hours have been reviewed.    Assessment/Plan:     Hyperbilirubinemia  1 day s/p EUS + ERCP with  subsequent improvement of Tbili from 5.5 to 2.5    Recommendations:  - Appreciate General Surgery plan for CCY today  - Diet as tolerated  - Monitor for cholangitis, pancreatitis, perforation, and bleeding      Thank you for your consult. After careful consideration and discussion with Dr. Prado, Advanced Endoscopy Service will sign off on this patient. Please contact us for any further questions or concerns.       TIMA Dangelo-JEN  Advanced Endoscopy Nurse Practitioner  [facility]

## 2023-01-18 NOTE — SUBJECTIVE & OBJECTIVE
Interval History: NAEON. Afebrile. HDS. Reports feeling better this AM. ERCP yesterday with choledocholithiasis found. Labs pending this AM. Pain is controlled.    Medications:  Continuous Infusions:   lactated ringers 100 mL/hr at 01/18/23 0527     Scheduled Meds:  PRN Meds:acetaminophen, dextrose 10%, dextrose 10%, glucagon (human recombinant), glucose, glucose, morphine, naloxone, ondansetron, sodium chloride 0.9%     Review of patient's allergies indicates:   Allergen Reactions    Iodine and iodide containing products     Shellfish containing products      Objective:     Vital Signs (Most Recent):  Temp: 97.9 °F (36.6 °C) (01/18/23 0317)  Pulse: 74 (01/18/23 0317)  Resp: 18 (01/18/23 0317)  BP: 132/61 (01/18/23 0317)  SpO2: (!) 93 % (01/18/23 0317) Vital Signs (24h Range):  Temp:  [97.5 °F (36.4 °C)-98.2 °F (36.8 °C)] 97.9 °F (36.6 °C)  Pulse:  [66-87] 74  Resp:  [10-20] 18  SpO2:  [93 %-98 %] 93 %  BP: (112-132)/(61-80) 132/61     Weight: 121.3 kg (267 lb 6.7 oz)  Body mass index is 41.88 kg/m².    Intake/Output - Last 3 Shifts         01/16 0700  01/17 0659 01/17 0700  01/18 0659 01/18 0700  01/19 0659    P.O.       I.V. (mL/kg) 2190.7 (18.1) 1713.1 (14.1)     Total Intake(mL/kg) 2190.7 (18.1) 1713.1 (14.1)     Net +2190.7 +1713.1            Urine Occurrence  2 x             Physical Exam  Vitals and nursing note reviewed.   Constitutional:       General: She is not in acute distress.     Appearance: She is well-developed. She is obese. She is not ill-appearing.      Comments: Room air   HENT:      Head: Normocephalic and atraumatic.      Mouth/Throat:      Mouth: Mucous membranes are moist.      Pharynx: Oropharynx is clear.   Eyes:      Extraocular Movements: Extraocular movements intact.      Conjunctiva/sclera: Conjunctivae normal.   Cardiovascular:      Rate and Rhythm: Normal rate.   Pulmonary:      Effort: Pulmonary effort is normal. No respiratory distress.   Abdominal:      General: There is no  distension.      Palpations: Abdomen is soft.      Tenderness: There is no abdominal tenderness. There is no guarding or rebound.      Comments: Abdomen is soft and non-distended. No surgical scars. Minimally tender in the RUQ and epigastrium. Not peritoneal.    Musculoskeletal:         General: No deformity.   Skin:     General: Skin is warm and dry.   Neurological:      Mental Status: She is alert and oriented to person, place, and time.       Significant Labs:  I have reviewed all pertinent lab results within the past 24 hours.  CBC:   Recent Labs   Lab 01/17/23 0409   WBC 5.38   RBC 4.82   HGB 13.6   HCT 43.7      MCV 91   MCH 28.2   MCHC 31.1*       CMP:   Recent Labs   Lab 01/17/23 0409      CALCIUM 9.0   ALBUMIN 3.0*   PROT 6.9      K 3.8   CO2 20*      BUN 3*   CREATININE 0.6   ALKPHOS 168*   *   *   BILITOT 5.5*         Significant Diagnostics:  I have reviewed all pertinent imaging results/findings within the past 24 hours.

## 2023-01-18 NOTE — NURSING TRANSFER
Nursing Transfer Note      1/18/2023     Reason patient is being transferred: OR    Transfer TO: SURGERY/[facility]    Transfer via wheelchair    Transfer with N/A    Transported by TECH    Medicines sent: N/A    Any special needs or follow-up needed: N/A    Chart send with patient: Yes    Notified: MOTHER/FAMILY    Pt off unit

## 2023-01-18 NOTE — ASSESSMENT & PLAN NOTE
47 y.o. female who presents with RUQ abdominal pain, nausea and vomiting.  - afebrile and hemodynamically stable.  - Labs with Tb elevated 6.1, , ALT 1212, lipase wnl.   - no leukocytosis, afebrile  - Ultrasound with cholelithiasis and gallbladder sludge with upper limits of normal wall thickness and reportedly positive sonographic Shell sign concerning for acute cholecystitis, although no pericholecystic fluid or wall hyperemia at this time.  There is suspected superimposed gallbladder adenomyomatosis.   - Admitted to hospital medicine  - IVF, Pain control with morphine  - MRCP with cholelithiasis, biliary sludge, and gallbladder adenomyomatosis.  No evidence of choledocholithiasis. Mild dilatation of the extra hepatic common bile duct, similar to findings on recent ultrasound.   - General surgery and AES consulted.  - ERCP 1/17 with choledocholithiasis, further findings as above  - Plan for cholecystectomy

## 2023-01-18 NOTE — PROGRESS NOTES
[facility] Medicine  Progress Note    Patient Name: Brooke Moss  MRN: [id number]  Patient Class: IP- Inpatient   Admission Date: 1/14/2023  Length of Stay: 4 days  Attending Physician: Saji Reyes*  Primary Care Provider: Primary Doctor No        Subjective:     Principal Problem:Right upper quadrant abdominal pain        HPI:  Brooke Moss is a 47 y.o. female who presents with abdominal pain. Patient states that pain woke her up from sleep on Friday and was RUQ and sharp, with radiations to back. She was diaphoretic and felt chills. It persisted throughout the day and then she started having nausea and vomiting. Reported vomiting ~12 times, unable to tolerate anything po. Did not see any blood. No changes in bowel habits. After vomiting so much, she started to have burning epigastric pain but the abdominal pain did improve a little bit, and she was able to sleep some Friday night. However, pain returned this morning and she presented to ER. She denies having had fever, chest pain, shortness of breath, urinary changes, lower extremity edema. She reports social EtOH use, with no recent increase in consumption, no changes in foods. She reports her sister recently had her gallbladder removed. Denies previous history of similar pain.       Overview/Hospital Course:  In the Ed, patient was afebrile and hemodynamically stable. Labs with Tb elevated 6.1, , ALT 1212, lipase wnl. Ultrasound with cholelithiasis and gallbladder sludge with upper limits of normal wall thickness and reportedly positive sonographic Shell sign concerning for acute cholecystitis, although no pericholecystic fluid or wall hyperemia at this time.  There is suspected superimposed gallbladder adenomyomatosis. General surgery and AES consulted. Patient was admitted to hospital medicine. MRCP with cholelithiasis, biliary sludge, and gallbladder adenomyomatosis.  No evidence of choledocholithiasis. Mild  dilatation of the extra hepatic common bile duct, similar to findings on recent ultrasound. EUS/ERCP 1/17 with a distal CBD stone causing an obstruction, complete removal was accomplished by biliary sphincterotomy and balloon extraction, the biliary tree was swept several times with clearance of the duct achieved, significant amount of sludge and all stones were removed.       Interval History: No acute events. S/p ERCP as above  Patient denies abdominal pain. Tolerated liquids yesterday  NPO now for cholecystectomy per gen surgery    Review of Systems  Objective:     Vital Signs (Most Recent):  Temp: 98.1 °F (36.7 °C) (01/18/23 0737)  Pulse: 88 (01/18/23 0737)  Resp: 18 (01/18/23 0737)  BP: 128/65 (01/18/23 0737)  SpO2: 96 % (01/18/23 0737) Vital Signs (24h Range):  Temp:  [97.5 °F (36.4 °C)-98.1 °F (36.7 °C)] 98.1 °F (36.7 °C)  Pulse:  [66-88] 88  Resp:  [10-20] 18  SpO2:  [93 %-98 %] 96 %  BP: (114-132)/(61-80) 128/65     Weight: 121.3 kg (267 lb 6.7 oz)  Body mass index is 41.88 kg/m².    Intake/Output Summary (Last 24 hours) at 1/18/2023 1317  Last data filed at 1/18/2023 0527  Gross per 24 hour   Intake 1713.1 ml   Output --   Net 1713.1 ml      Physical Exam  Constitutional:       General: She is not in acute distress.     Appearance: Normal appearance. She is obese. She is not toxic-appearing or diaphoretic.   HENT:      Head: Normocephalic and atraumatic.   Cardiovascular:      Rate and Rhythm: Normal rate and regular rhythm.      Heart sounds: No murmur heard.    No friction rub. No gallop.   Pulmonary:      Effort: Pulmonary effort is normal. No respiratory distress.      Breath sounds: Normal breath sounds. No wheezing or rales.   Abdominal:      General: Abdomen is flat. There is no distension.      Palpations: Abdomen is soft.      Tenderness: There is abdominal tenderness. There is no guarding or rebound.   Musculoskeletal:      Right lower leg: No edema.      Left lower leg: No edema.   Skin:      General: Skin is warm and dry.   Neurological:      General: No focal deficit present.      Mental Status: She is alert and oriented to person, place, and time.       MELD-Na score: 18 at 1/16/2023  4:22 AM  MELD score: 14 at 1/16/2023  4:22 AM  Calculated from:  Serum Creatinine: 0.7 mg/dL (Using min of 1 mg/dL) at 1/16/2023  4:22 AM  Serum Sodium: 132 mmol/L at 1/16/2023  4:22 AM  Total Bilirubin: 5.4 mg/dL at 1/16/2023  4:22 AM  INR(ratio): 1.1 at 1/14/2023  3:07 PM  Age: 47 years    Significant Labs:  CBC:  Recent Labs   Lab 01/17/23  0409 01/18/23  0814   WBC 5.38 7.83   HGB 13.6 14.1   HCT 43.7 43.7    262     CMP:  Recent Labs   Lab 01/17/23  0409 01/18/23  0814    139   K 3.8 3.6    108   CO2 20* 19*    99   BUN 3* 7   CREATININE 0.6 0.8   CALCIUM 9.0 9.2   PROT 6.9 7.2   ALBUMIN 3.0* 3.1*   BILITOT 5.5* 2.5*   ALKPHOS 168* 188*   * 124*   * 563*   ANIONGAP 11 12     PTINR:  No results for input(s): INR in the last 48 hours.    Significant Procedures:   Dobutamine Stress Test with Color Flow: No results found for this or any previous visit.      Assessment/Plan:      * Right upper quadrant abdominal pain  47 y.o. female who presents with RUQ abdominal pain, nausea and vomiting.  - afebrile and hemodynamically stable.  - Labs with Tb elevated 6.1, , ALT 1212, lipase wnl.   - no leukocytosis, afebrile  - Ultrasound with cholelithiasis and gallbladder sludge with upper limits of normal wall thickness and reportedly positive sonographic Shell sign concerning for acute cholecystitis, although no pericholecystic fluid or wall hyperemia at this time.  There is suspected superimposed gallbladder adenomyomatosis.   - Admitted to hospital medicine  - IVF, Pain control with morphine  - MRCP with cholelithiasis, biliary sludge, and gallbladder adenomyomatosis.  No evidence of choledocholithiasis. Mild dilatation of the extra hepatic common bile duct, similar to findings on  recent ultrasound.   - General surgery and AES consulted.  - ERCP 1/17 with choledocholithiasis, further findings as above  - Plan for cholecystectomy     VTE Risk Mitigation (From admission, onward)         Ordered     IP VTE HIGH RISK PATIENT  Once         01/14/23 1642     Place sequential compression device  Until discontinued         01/14/23 1642                Discharge Planning   NARAYAN: 1/23/2023     Code Status: Full Code   Is the patient medically ready for discharge?:     Reason for patient still in hospital (select all that apply): Patient trending condition, Treatment and Consult recommendations  Discharge Plan A: Home   Discharge Delays: (!) Procedure Scheduling (IR, OR, Labs, Echo, Cath, Echo, EEG)      Saji Medina MD  Department of Hospital Medicine   [facility]

## 2023-01-18 NOTE — ASSESSMENT & PLAN NOTE
Brooke Moss is a 48 y.o. female with acute onset RUQ pain, N/V. Tbili 6.1 and LFTs 800/1200 on admit. US shows GB adenomyosis and stones/sludge, MRCP does not demonstrate any ductal obstruction. No imaging evidence to support choledocholithiasis at the current time.  Hepatitis panel negative. Serum tylenol level normal.   Possible that she had an obstructing CBD stone that passed prior to imaging. S/p ERCP with choledocholithiasis found 1/17    - NPO  - Plan for OR today for CCY  - Will return for consent   - Trend T bili and LFTs  - Remainder of care per primary team  - Please contact general surgery with any questions, concerns, or clinical status changes

## 2023-01-18 NOTE — NURSING TRANSFER
Nursing Transfer Note      1/18/2023     Reason patient is being transferred: unit     Transfer From: [facility]    Transfer via stretcher   Upon arrival, vitals maintained, pt reassessed pain wnl, scd applied, bed in lowest position, side rails up x 3, call bell in reach.

## 2023-01-18 NOTE — NURSING TRANSFER
Nursing Transfer Note      1/18/2023     Reason patient is being transferred: post anesthesia care complete    Transfer To: [facility] 1055 returning    Transfer via stretcher    Transfer with n/a    Transported by transporter x1    Medicines sent: n/a    Any special needs or follow-up needed: routine    Chart send with patient: Yes    Notified: family    Patient reassessed at: 01/18/2023 at 1610

## 2023-01-18 NOTE — ASSESSMENT & PLAN NOTE
1 day s/p EUS + ERCP with subsequent improvement of Tbili from 5.5 to 2.5    Recommendations:  - Appreciate General Surgery plan for CCY today  - Diet as tolerated  - Monitor for cholangitis, pancreatitis, perforation, and bleeding

## 2023-01-18 NOTE — ANESTHESIA POSTPROCEDURE EVALUATION
Anesthesia Post Evaluation    Patient: Brooke Moss    Procedure(s) Performed: Procedure(s) (LRB):  CHOLECYSTECTOMY, LAPAROSCOPIC (N/A)    Final Anesthesia Type: general      Patient location during evaluation: PACU  Patient participation: Yes- Able to Participate  Level of consciousness: awake and alert  Post-procedure vital signs: reviewed and stable  Pain management: adequate  Airway patency: patent    PONV status at discharge: No PONV  Anesthetic complications: no      Cardiovascular status: hemodynamically stable  Respiratory status: unassisted  Hydration status: euvolemic  Follow-up not needed.          Vitals Value Taken Time   /71 01/18/23 1718   Temp 36.6 °C (97.8 °F) 01/18/23 1718   Pulse 73 01/18/23 1718   Resp 16 01/18/23 1718   SpO2 94 % 01/18/23 1718         Event Time   Out of Recovery 01/18/2023 16:08:00         Pain/Krystian Score: Pain Rating Prior to Med Admin: 7 (1/18/2023  4:17 PM)  Pain Rating Post Med Admin: 0 (1/17/2023  2:02 PM)  Krystian Score: 10 (1/18/2023  5:18 PM)

## 2023-01-18 NOTE — ANESTHESIA PREPROCEDURE EVALUATION
01/18/2023  Brooke Moss is a 48 y.o., female with cholelithiasis here for laparoscopic cholecystectomy.    Pre-operative evaluation for Procedure(s) (LRB):  CHOLECYSTECTOMY, LAPAROSCOPIC (N/A)        Patient Active Problem List   Diagnosis    Right upper quadrant abdominal pain    Hyperbilirubinemia       Review of patient's allergies indicates:   Allergen Reactions    Iodine and iodide containing products     Shellfish containing products        No current facility-administered medications on file prior to encounter.     Current Outpatient Medications on File Prior to Encounter   Medication Sig Dispense Refill    azelastine (ASTELIN) 137 mcg (0.1 %) nasal spray 2 sprays (274 mcg total) by Nasal route 2 (two) times daily. 30 mL 0    methocarbamoL (ROBAXIN) 500 MG Tab Take 1 tablet (500 mg total) by mouth nightly as needed (back pain). 15 tablet 0    mupirocin (BACTROBAN) 2 % ointment Apply topically 4 (four) times daily. (Patient not taking: No sig reported) 1 Tube 0    mupirocin (BACTROBAN) 2 % ointment Apply to affected area 3 times daily (Patient not taking: No sig reported) 22 g 0       Past Surgical History:   Procedure Laterality Date    ENDOSCOPIC ULTRASOUND OF UPPER GASTROINTESTINAL TRACT N/A 1/17/2023    Procedure: ULTRASOUND, UPPER GI TRACT, ENDOSCOPIC;  Surgeon: Opal Prado MD;  Location: [address]);  Service: Endoscopy;  Laterality: N/A;    ERCP N/A 1/17/2023    Procedure: ERCP (ENDOSCOPIC RETROGRADE CHOLANGIOPANCREATOGRAPHY);  Surgeon: Opal Prado MD;  Location: [address]);  Service: Endoscopy;  Laterality: N/A;    INCISION AND DRAINAGE, PILONIDAL CYST, SIMPLE  02/19/2021       Social History     Socioeconomic History    Marital status: Single   Tobacco Use    Smoking status: Every Day     Types: Vaping with nicotine, Cigarettes    Smokeless tobacco:  Never   Substance and Sexual Activity    Alcohol use: No    Drug use: No    Sexual activity: Not Currently         CBC:   Recent Labs     01/17/23  0409 01/18/23  0814   WBC 5.38 7.83   RBC 4.82 4.91   HGB 13.6 14.1   HCT 43.7 43.7    262   MCV 91 89   MCH 28.2 28.7   MCHC 31.1* 32.3       CMP:   Recent Labs     01/17/23  0409 01/18/23  0814    139   K 3.8 3.6    108   CO2 20* 19*   BUN 3* 7   CREATININE 0.6 0.8    99   CALCIUM 9.0 9.2   ALBUMIN 3.0* 3.1*   PROT 6.9 7.2   ALKPHOS 168* 188*   * 563*   * 124*   BILITOT 5.5* 2.5*       INR  No results for input(s): PT, INR, PROTIME, APTT in the last 72 hours.            2D Echo:  No results found for this or any previous visit.        Pre-op Assessment    I have reviewed the Patient Summary Reports.     I have reviewed the Nursing Notes.    I have reviewed the Medications.     Review of Systems  Anesthesia Hx:  No problems with previous Anesthesia    Hematology/Oncology:  Hematology Normal   Oncology Normal     EENT/Dental:EENT/Dental Normal   Cardiovascular:  Cardiovascular Normal     Pulmonary:  Pulmonary Normal    Renal/:  Renal/ Normal     Hepatic/GI:  Hepatic/GI Normal    Musculoskeletal:  Musculoskeletal Normal    Neurological:  Neurology Normal    Endocrine:  Endocrine Normal    Dermatological:  Skin Normal    Psych:  Psychiatric Normal           Physical Exam  General: Well nourished    Airway:  Mallampati: II   Mouth Opening: Normal  TM Distance: Normal  Tongue: Normal  Neck ROM: Normal ROM    Dental:  Intact    Chest/Lungs:  Clear to auscultation, Normal Respiratory Rate    Heart:  Rate: Normal  Rhythm: Regular Rhythm  Sounds: Normal        Anesthesia Plan  Type of Anesthesia, risks & benefits discussed:    Anesthesia Type: Gen ETT  Intra-op Monitoring Plan: Standard ASA Monitors  Post Op Pain Control Plan: multimodal analgesia and IV/PO Opioids PRN  Induction:  IV  Airway Plan: Direct, Post-Induction  Informed  Consent: Informed consent signed with the Patient and all parties understand the risks and agree with anesthesia plan.  All questions answered. Patient consented to blood products? Yes  ASA Score: 3  Day of Surgery Review of History & Physical: H&P Update referred to the surgeon/provider.  Anesthesia Plan Notes: Discussed plan for General endotracheal anesthesia    Ready For Surgery From Anesthesia Perspective.     .

## 2023-01-18 NOTE — OP NOTE
[facility] - Surgery ([facility])  Surgery Department  Operative Note    SUMMARY     Date of Procedure: 1/18/2023     Procedure: Procedure(s) (LRB):  CHOLECYSTECTOMY, LAPAROSCOPIC (N/A)     Surgeon(s) and Role:     * Satish Romano MD - Primary     * Romeo Davies MD - Resident - Assisting     * HADLEY Kessler MD - Resident - Observing        Pre-Operative Diagnosis: Calculus of gallbladder with acute cholecystitis and obstruction [K80.01]    Post-Operative Diagnosis: Post-Op Diagnosis Codes:     * Calculus of gallbladder with acute cholecystitis and obstruction [K80.01]    Anesthesia: Choice    Operative Findings (including complications, if any):   Chronic cholecystitis  Dilated cystic duct (about 8 mm)    Description of Technical Procedures: Patient brought to operating room.  Moved to table.  Prepped and draped in standard fashion.  Time-out performed, and all are in agreement.     Local anesthetic was injected at the superior aspect of the umbilicus.  Incision made.  Access gained to the peritoneal cavity via the umbilicus with Lucian technique.  Additional ports placed standard fashion for laparoscopic cholecystectomy.     Adhesions and peritoneum taken down from infundibulum of gallbladder with combination of blunt and hook electrocautery dissection. The cystic duct was dissected free. It was dilated, about 8 mm in diameter and contained sludge. Sludge was milked up into the gallbladder.     The cystic artery was not definitively identified. There was no significant bleeding during the case. There was no pulsatile bleeding.    The cystic duct was then clipped. The clips just barely crossed the duct so an endoloop was brought in after dividing the gallbladder and placed just below the clips. The duct contained thick sludge.    The gallbladder was then removed from the cystic plate with cautery.    It was brought out with an Endo-Catch bag.     We then irrigated the right upper quadrant and ensured  adequate hemostasis.     We withdrew our trocars, allowing for adequate escape of pneumoperitoneum.    Fascia closed with 0- vicryl figure of eight.     Patient tolerated the procedure well was hemodynamically stable we brought to PACU and will be discharged home.      Estimated Blood Loss (EBL): 10 ml           Implants: * No implants in log *    Specimens:   Specimen (24h ago, onward)       Start     Ordered    01/18/23 1508  Specimen to Pathology, Surgery General Surgery  Once        Comments: Pre-op Diagnosis: Calculus of gallbladder with acute cholecystitis and obstruction [K80.01]Procedure(s):CHOLECYSTECTOMY, LAPAROSCOPIC Number of specimens: 1.Name of specimens: 1. GALLBLADDER (PS)     References:    Click here for ordering Quick Tip   Question Answer Comment   Procedure Type: General Surgery    Specimen Class: Routine/Screening    Which provider would you like to cc? MARY COX    Release to patient Immediate        01/18/23 1504                            Condition: Good    Disposition: PACU - hemodynamically stable.    Attestation: I was present for the entire procedure.    HADLEY Kessler MD   General Surgery, PGY-5

## 2023-01-18 NOTE — SUBJECTIVE & OBJECTIVE
Interval History: No acute events. S/p ERCP as above  Patient denies abdominal pain. Tolerated liquids yesterday  NPO now for cholecystectomy per gen surgery    Review of Systems  Objective:     Vital Signs (Most Recent):  Temp: 98.1 °F (36.7 °C) (01/18/23 0737)  Pulse: 88 (01/18/23 0737)  Resp: 18 (01/18/23 0737)  BP: 128/65 (01/18/23 0737)  SpO2: 96 % (01/18/23 0737) Vital Signs (24h Range):  Temp:  [97.5 °F (36.4 °C)-98.1 °F (36.7 °C)] 98.1 °F (36.7 °C)  Pulse:  [66-88] 88  Resp:  [10-20] 18  SpO2:  [93 %-98 %] 96 %  BP: (114-132)/(61-80) 128/65     Weight: 121.3 kg (267 lb 6.7 oz)  Body mass index is 41.88 kg/m².    Intake/Output Summary (Last 24 hours) at 1/18/2023 1317  Last data filed at 1/18/2023 0527  Gross per 24 hour   Intake 1713.1 ml   Output --   Net 1713.1 ml      Physical Exam  Constitutional:       General: She is not in acute distress.     Appearance: Normal appearance. She is obese. She is not toxic-appearing or diaphoretic.   HENT:      Head: Normocephalic and atraumatic.   Cardiovascular:      Rate and Rhythm: Normal rate and regular rhythm.      Heart sounds: No murmur heard.    No friction rub. No gallop.   Pulmonary:      Effort: Pulmonary effort is normal. No respiratory distress.      Breath sounds: Normal breath sounds. No wheezing or rales.   Abdominal:      General: Abdomen is flat. There is no distension.      Palpations: Abdomen is soft.      Tenderness: There is abdominal tenderness. There is no guarding or rebound.   Musculoskeletal:      Right lower leg: No edema.      Left lower leg: No edema.   Skin:     General: Skin is warm and dry.   Neurological:      General: No focal deficit present.      Mental Status: She is alert and oriented to person, place, and time.       MELD-Na score: 18 at 1/16/2023  4:22 AM  MELD score: 14 at 1/16/2023  4:22 AM  Calculated from:  Serum Creatinine: 0.7 mg/dL (Using min of 1 mg/dL) at 1/16/2023  4:22 AM  Serum Sodium: 132 mmol/L at 1/16/2023  4:22  AM  Total Bilirubin: 5.4 mg/dL at 1/16/2023  4:22 AM  INR(ratio): 1.1 at 1/14/2023  3:07 PM  Age: 47 years    Significant Labs:  CBC:  Recent Labs   Lab 01/17/23  0409 01/18/23  0814   WBC 5.38 7.83   HGB 13.6 14.1   HCT 43.7 43.7    262     CMP:  Recent Labs   Lab 01/17/23  0409 01/18/23  0814    139   K 3.8 3.6    108   CO2 20* 19*    99   BUN 3* 7   CREATININE 0.6 0.8   CALCIUM 9.0 9.2   PROT 6.9 7.2   ALBUMIN 3.0* 3.1*   BILITOT 5.5* 2.5*   ALKPHOS 168* 188*   * 124*   * 563*   ANIONGAP 11 12     PTINR:  No results for input(s): INR in the last 48 hours.    Significant Procedures:   Dobutamine Stress Test with Color Flow: No results found for this or any previous visit.

## 2023-01-19 VITALS
HEIGHT: 67 IN | SYSTOLIC BLOOD PRESSURE: 120 MMHG | BODY MASS INDEX: 41.98 KG/M2 | WEIGHT: 267.44 LBS | TEMPERATURE: 99 F | HEART RATE: 73 BPM | OXYGEN SATURATION: 96 % | DIASTOLIC BLOOD PRESSURE: 78 MMHG | RESPIRATION RATE: 18 BRPM

## 2023-01-19 PROBLEM — R74.01 ELEVATED LIVER TRANSAMINASE LEVEL: Status: RESOLVED | Noted: 2023-01-18 | Resolved: 2023-01-19

## 2023-01-19 PROBLEM — E80.6 HYPERBILIRUBINEMIA: Status: RESOLVED | Noted: 2023-01-17 | Resolved: 2023-01-19

## 2023-01-19 PROBLEM — K80.21 CALCULUS OF GALLBLADDER WITH BILIARY OBSTRUCTION BUT WITHOUT CHOLECYSTITIS: Status: RESOLVED | Noted: 2023-01-19 | Resolved: 2023-01-19

## 2023-01-19 PROBLEM — R10.11 RIGHT UPPER QUADRANT ABDOMINAL PAIN: Status: RESOLVED | Noted: 2023-01-14 | Resolved: 2023-01-19

## 2023-01-19 PROBLEM — E66.01 SEVERE OBESITY (BMI >= 40): Chronic | Status: ACTIVE | Noted: 2023-01-19

## 2023-01-19 PROBLEM — K80.21 CALCULUS OF GALLBLADDER WITH BILIARY OBSTRUCTION BUT WITHOUT CHOLECYSTITIS: Status: ACTIVE | Noted: 2023-01-19

## 2023-01-19 LAB
ALBUMIN SERPL BCP-MCNC: 2.8 G/DL (ref 3.5–5.2)
ALP SERPL-CCNC: 150 U/L (ref 55–135)
ALT SERPL W/O P-5'-P-CCNC: 385 U/L (ref 10–44)
ANION GAP SERPL CALC-SCNC: 8 MMOL/L (ref 8–16)
AST SERPL-CCNC: 90 U/L (ref 10–40)
BASOPHILS # BLD AUTO: 0.04 K/UL (ref 0–0.2)
BASOPHILS NFR BLD: 0.4 % (ref 0–1.9)
BILIRUB SERPL-MCNC: 1.7 MG/DL (ref 0.1–1)
BUN SERPL-MCNC: 5 MG/DL (ref 6–20)
CALCIUM SERPL-MCNC: 8.6 MG/DL (ref 8.7–10.5)
CHLORIDE SERPL-SCNC: 106 MMOL/L (ref 95–110)
CO2 SERPL-SCNC: 23 MMOL/L (ref 23–29)
CREAT SERPL-MCNC: 0.8 MG/DL (ref 0.5–1.4)
DIFFERENTIAL METHOD: ABNORMAL
EOSINOPHIL # BLD AUTO: 0.1 K/UL (ref 0–0.5)
EOSINOPHIL NFR BLD: 1.5 % (ref 0–8)
ERYTHROCYTE [DISTWIDTH] IN BLOOD BY AUTOMATED COUNT: 14.4 % (ref 11.5–14.5)
EST. GFR  (NO RACE VARIABLE): >60 ML/MIN/1.73 M^2
GLUCOSE SERPL-MCNC: 95 MG/DL (ref 70–110)
HCT VFR BLD AUTO: 39.6 % (ref 37–48.5)
HGB BLD-MCNC: 12.6 G/DL (ref 12–16)
IMM GRANULOCYTES # BLD AUTO: 0.02 K/UL (ref 0–0.04)
IMM GRANULOCYTES NFR BLD AUTO: 0.2 % (ref 0–0.5)
LYMPHOCYTES # BLD AUTO: 2.1 K/UL (ref 1–4.8)
LYMPHOCYTES NFR BLD: 21.9 % (ref 18–48)
MCH RBC QN AUTO: 28.7 PG (ref 27–31)
MCHC RBC AUTO-ENTMCNC: 31.8 G/DL (ref 32–36)
MCV RBC AUTO: 90 FL (ref 82–98)
MONOCYTES # BLD AUTO: 0.8 K/UL (ref 0.3–1)
MONOCYTES NFR BLD: 8.3 % (ref 4–15)
NEUTROPHILS # BLD AUTO: 6.5 K/UL (ref 1.8–7.7)
NEUTROPHILS NFR BLD: 67.7 % (ref 38–73)
NRBC BLD-RTO: 0 /100 WBC
PLATELET # BLD AUTO: 234 K/UL (ref 150–450)
PMV BLD AUTO: 10.7 FL (ref 9.2–12.9)
POTASSIUM SERPL-SCNC: 3.3 MMOL/L (ref 3.5–5.1)
PROT SERPL-MCNC: 6.4 G/DL (ref 6–8.4)
RBC # BLD AUTO: 4.39 M/UL (ref 4–5.4)
SODIUM SERPL-SCNC: 137 MMOL/L (ref 136–145)
WBC # BLD AUTO: 9.61 K/UL (ref 3.9–12.7)

## 2023-01-19 PROCEDURE — 99239 HOSP IP/OBS DSCHRG MGMT >30: CPT | Mod: ,,, | Performed by: INTERNAL MEDICINE

## 2023-01-19 PROCEDURE — 36415 COLL VENOUS BLD VENIPUNCTURE: CPT | Performed by: STUDENT IN AN ORGANIZED HEALTH CARE EDUCATION/TRAINING PROGRAM

## 2023-01-19 PROCEDURE — 80053 COMPREHEN METABOLIC PANEL: CPT | Performed by: STUDENT IN AN ORGANIZED HEALTH CARE EDUCATION/TRAINING PROGRAM

## 2023-01-19 PROCEDURE — 63600175 PHARM REV CODE 636 W HCPCS: Performed by: STUDENT IN AN ORGANIZED HEALTH CARE EDUCATION/TRAINING PROGRAM

## 2023-01-19 PROCEDURE — 99239 PR HOSPITAL DISCHARGE DAY,>30 MIN: ICD-10-PCS | Mod: ,,, | Performed by: INTERNAL MEDICINE

## 2023-01-19 PROCEDURE — 85025 COMPLETE CBC W/AUTO DIFF WBC: CPT | Performed by: STUDENT IN AN ORGANIZED HEALTH CARE EDUCATION/TRAINING PROGRAM

## 2023-01-19 RX ADMIN — MORPHINE SULFATE 4 MG: 4 INJECTION INTRAVENOUS at 12:01

## 2023-01-19 RX ADMIN — MORPHINE SULFATE 4 MG: 4 INJECTION INTRAVENOUS at 06:01

## 2023-01-19 NOTE — ASSESSMENT & PLAN NOTE
Brooke Moss is a 48 y.o. female with acute onset RUQ pain, N/V. Tbili 6.1 and LFTs 800/1200 on admit. US shows GB adenomyosis and stones/sludge, MRCP does not demonstrate any ductal obstruction. No imaging evidence to support choledocholithiasis at the current time.  Hepatitis panel negative. Serum tylenol level normal.   Possible that she had an obstructing CBD stone that passed prior to imaging. S/p ERCP with choledocholithiasis found 1/17    - Stable for dc from surgical standpoint   - Post post instructions as listed below  - Follow up in general surgery clinic in 2 weeks. Message sent to clinic for scheduling. Clinic # is [phone]  - Remainder of care per primary team  - Please contact general surgery with any questions, concerns, or clinical status changes      Post Op Instructions     WOUND CARE  -- You have skin glue over your incision(s); this will slowly flake away over the next few weeks. Do not pick at surgical glue, it will come off on its own.   -- Ok to shower; however, no baths or submerging in water (I.e. swimming, submerging in water) for at least two weeks.  -- Please keep the incision clean with soap and water, pat your incision dry, do not scrub hard over your incisions.     MEDICATIONS AND PAIN CONTROL  -- Please resume all home medications as instructed and take any newly prescribed medications.  -- Most people find that over-the-counter pain medications (Tylenol combined with Ibuprofen) will be sufficient for most pain control.  -- You have been prescribed a narcotic pain medication. Please wean narcotic over the next few daysIf you're taking prescription narcotics, do not drive or operate heavy machinery. Do not drive if your pain is not controlled enough for you to react quickly safely.  -- No straining, avoid constipation. May take OTC stool softeners as described and laxatives as needed.     OTHER INSTRUCTIONS  -- Monitor for temp > 101 F, bleeding, redness, purulent drainage,  or any sudden, new extreme pain. If any occur, please call our clinic or go to the emergency department if after normal business hours.  -- You may resume your regular diet as tolerated.   -- No heavy lifting (anything >10 lbs or = to a gallon of milk) or strenuous exercise until cleared by a physician. No pushing or pulling activities such as vacuuming or raking. Otherwise, activity as tolerated.   -- Follow up with Dr. Romano in 2 weeks in clinic for a post-op check. Message sent to clinic for scheduling. Clinic # is [phone]

## 2023-01-19 NOTE — SUBJECTIVE & OBJECTIVE
Interval History: NAEON. Afebrile. HDS. Pain is controlled. No new symptoms or concerns this morning. Family at bedside. All questions answered.     Medications:  Continuous Infusions:  Scheduled Meds:  PRN Meds:acetaminophen, dextrose 10%, dextrose 10%, glucagon (human recombinant), glucose, glucose, morphine, naloxone, ondansetron, sodium chloride 0.9%     Review of patient's allergies indicates:   Allergen Reactions    Iodine and iodide containing products     Shellfish containing products      Objective:     Vital Signs (Most Recent):  Temp: 98.2 °F (36.8 °C) (01/19/23 0409)  Pulse: 72 (01/19/23 0409)  Resp: 20 (01/19/23 0641)  BP: 133/64 (01/19/23 0409)  SpO2: (!) 93 % (01/19/23 0409)   Vital Signs (24h Range):  Temp:  [97.7 °F (36.5 °C)-98.8 °F (37.1 °C)] 98.2 °F (36.8 °C)  Pulse:  [70-88] 72  Resp:  [12-28] 20  SpO2:  [91 %-100 %] 93 %  BP: (115-134)/(55-71) 133/64     Weight: 121.3 kg (267 lb 6.7 oz)  Body mass index is 41.88 kg/m².    Intake/Output - Last 3 Shifts         01/17 0700  01/18 0659 01/18 0700  01/19 0659 01/19 0700  01/20 0659    P.O.  100     I.V. (mL/kg) 1713.1 (14.1)      IV Piggyback  1000     Total Intake(mL/kg) 1713.1 (14.1) 1100 (9.1)     Net +1713.1 +1100            Urine Occurrence 2 x 2 x     Stool Occurrence  0 x             Physical Exam  Vitals and nursing note reviewed.   Constitutional:       General: She is not in acute distress.     Appearance: She is obese. She is not diaphoretic.      Comments: Room air   HENT:      Head: Normocephalic and atraumatic.      Mouth/Throat:      Mouth: Mucous membranes are moist.      Pharynx: Oropharynx is clear.   Eyes:      Extraocular Movements: Extraocular movements intact.      Conjunctiva/sclera: Conjunctivae normal.   Cardiovascular:      Rate and Rhythm: Normal rate.   Pulmonary:      Effort: Pulmonary effort is normal. No respiratory distress.   Abdominal:      General: There is no distension.      Palpations: Abdomen is soft.       Tenderness: There is no guarding or rebound.      Comments: Incision is clean, dry, and intact without drainage, erythema, or increased warmth. Appropriately TTP.   Musculoskeletal:         General: No deformity.      Cervical back: Normal range of motion.   Skin:     General: Skin is warm and dry.   Neurological:      Mental Status: She is alert and oriented to person, place, and time.       Significant Labs:  I have reviewed all pertinent lab results within the past 24 hours.    Significant Diagnostics:  I have reviewed all pertinent imaging results/findings within the past 24 hours.

## 2023-01-19 NOTE — PLAN OF CARE
[facility] - [facility]  Discharge Final Note    Primary Care Provider: Primary Doctor No-pt will have PCP care upon d/c at [facility]    Expected Discharge Date: 1/19/2023    Pt will dc today to home; no post acute services needed. SW f/up with pt-discussed f/up appts, pt has ride home.    Final Discharge Note (most recent)       Final Note - 01/19/23 0858          Final Note    Assessment Type Final Discharge Note     Anticipated Discharge Disposition Home or Self Care     Hospital Resources/Appts/Education Provided Appointments scheduled and added to AVS        Post-Acute Status    Post-Acute Authorization Other     Other Status No Post-Acute Service Needs     Discharge Delays None known at this time                   Future Appointments   Date Time Provider Department Center   1/27/2023 10:00 AM Franco Medeiros MD [facility]   2/1/2023  9:15 AM Satish Romano MD [facility] GEN[facility] Azeem Sarkar LCSW  PRN

## 2023-01-19 NOTE — PROGRESS NOTES
[facility] - [facility]  General Surgery  Progress Note    Subjective:     History of Present Illness:  Brooke Moss is a 47 y.o. female presenting with RUQ pain on 1/14/22. Labwork significant for Tbili 6.1 and LFTs of 895/1212. No leukocytosis, but slight left shift of 76%. US demonstrated GB wall thickening from adenomyosis and an abundance of gallstones and sludge, however there was no visible intra or extrahepatic biliary ductal dilation. Follow up MRCP was performed to rule out choledocholithiasis, but no biliary obstruction was seen (no CBD dilation, no common duct stone, no Mirizzi syndrome). General surgery consulted for evaluation of possible cholecystitis.     Pt states that she was in her usual state of health until Friday. She had epigastric pain, that migrated to her RUQ and radiated through to her back. Several episodes of NBNB emesis, dehydrated upon presentation. Notes cola-colored urine today. Last BM this AM, normal in color and consistency. ED concerned for jaundice, but patient and her mother state that this is her normal complexion (she likes to tan outside). No scleral icterus nor yellowing of her mucosal membranes.     PSH: no prior abdominal surgeries. Tonillectomy as a child, did well with anesthesia.  Substance: socially drinks about twice a month (2-6 beers on those days)  Social: immigrated from [place]. 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       Post-Op Info:  Procedure(s) (LRB):  CHOLECYSTECTOMY, LAPAROSCOPIC (N/A)   1 Day Post-Op     Interval History: NAEON. Afebrile. HDS. POD1 lap CCY. Pain is controlled. No new symptoms or concerns this morning. Family at bedside. All questions answered.     Medications:  Continuous Infusions:  Scheduled Meds:  PRN Meds:acetaminophen, dextrose 10%, dextrose 10%, glucagon (human recombinant), glucose, glucose, morphine, naloxone, ondansetron, sodium chloride 0.9%     Review of patient's allergies indicates:   Allergen Reactions     Iodine and iodide containing products     Shellfish containing products      Objective:     Vital Signs (Most Recent):  Temp: 98.2 °F (36.8 °C) (01/19/23 0409)  Pulse: 72 (01/19/23 0409)  Resp: 20 (01/19/23 0641)  BP: 133/64 (01/19/23 0409)  SpO2: (!) 93 % (01/19/23 0409)   Vital Signs (24h Range):  Temp:  [97.7 °F (36.5 °C)-98.8 °F (37.1 °C)] 98.2 °F (36.8 °C)  Pulse:  [70-88] 72  Resp:  [12-28] 20  SpO2:  [91 %-100 %] 93 %  BP: (115-134)/(55-71) 133/64     Weight: 121.3 kg (267 lb 6.7 oz)  Body mass index is 41.88 kg/m².    Intake/Output - Last 3 Shifts         01/17 0700  01/18 0659 01/18 0700  01/19 0659 01/19 0700  01/20 0659    P.O.  100     I.V. (mL/kg) 1713.1 (14.1)      IV Piggyback  1000     Total Intake(mL/kg) 1713.1 (14.1) 1100 (9.1)     Net +1713.1 +1100            Urine Occurrence 2 x 2 x     Stool Occurrence  0 x             Physical Exam  Vitals and nursing note reviewed.   Constitutional:       General: She is not in acute distress.     Appearance: She is obese. She is not diaphoretic.      Comments: Room air   HENT:      Head: Normocephalic and atraumatic.      Mouth/Throat:      Mouth: Mucous membranes are moist.      Pharynx: Oropharynx is clear.   Eyes:      Extraocular Movements: Extraocular movements intact.      Conjunctiva/sclera: Conjunctivae normal.   Cardiovascular:      Rate and Rhythm: Normal rate.   Pulmonary:      Effort: Pulmonary effort is normal. No respiratory distress.   Abdominal:      General: There is no distension.      Palpations: Abdomen is soft.      Tenderness: There is no guarding or rebound.      Comments: Incision is clean, dry, and intact without drainage, erythema, or increased warmth. Appropriately TTP.   Musculoskeletal:         General: No deformity.      Cervical back: Normal range of motion.   Skin:     General: Skin is warm and dry.   Neurological:      Mental Status: She is alert and oriented to person, place, and time.       Significant Labs:  I have  reviewed all pertinent lab results within the past 24 hours.    Significant Diagnostics:  I have reviewed all pertinent imaging results/findings within the past 24 hours.    Assessment/Plan:     Right upper quadrant abdominal pain  Brooke Moss is a 48 y.o. female with acute onset RUQ pain, N/V. Tbili 6.1 and LFTs 800/1200 on admit. US shows GB adenomyosis and stones/sludge, MRCP does not demonstrate any ductal obstruction. No imaging evidence to support choledocholithiasis at the current time.  Hepatitis panel negative. Serum tylenol level normal.   Possible that she had an obstructing CBD stone that passed prior to imaging. S/p ERCP with choledocholithiasis found 1/17    - Stable for dc from surgical standpoint   - Post post instructions as listed below  - Follow up in general surgery clinic in 2 weeks. Message sent to clinic for scheduling. Clinic # is [phone]  - Remainder of care per primary team  - Please contact general surgery with any questions, concerns, or clinical status changes      Post Op Instructions     WOUND CARE  -- You have skin glue over your incision(s); this will slowly flake away over the next few weeks. Do not pick at surgical glue, it will come off on its own.   -- Ok to shower; however, no baths or submerging in water (I.e. swimming, submerging in water) for at least two weeks.  -- Please keep the incision clean with soap and water, pat your incision dry, do not scrub hard over your incisions.     MEDICATIONS AND PAIN CONTROL  -- Please resume all home medications as instructed and take any newly prescribed medications.  -- Most people find that over-the-counter pain medications (Tylenol combined with Ibuprofen) will be sufficient for most pain control.  -- You have been prescribed a narcotic pain medication. Please wean narcotic over the next few daysIf you're taking prescription narcotics, do not drive or operate heavy machinery. Do not drive if your pain is not controlled  enough for you to react quickly safely.  -- No straining, avoid constipation. May take OTC stool softeners as described and laxatives as needed.     OTHER INSTRUCTIONS  -- Monitor for temp > 101 F, bleeding, redness, purulent drainage, or any sudden, new extreme pain. If any occur, please call our clinic or go to the emergency department if after normal business hours.  -- You may resume your regular diet as tolerated.   -- No heavy lifting (anything >10 lbs or = to a gallon of milk) or strenuous exercise until cleared by a physician. No pushing or pulling activities such as vacuuming or raking. Otherwise, activity as tolerated.   -- Follow up with Dr. Romano in 2 weeks in clinic for a post-op check. Message sent to clinic for scheduling. Clinic # is [phone]          Case discussed with Dr. Romano.      MOE RashidC  General Surgery  [facility] [place]

## 2023-01-19 NOTE — NURSING
AVS/discharge instructions printed and reviewed with pt. Pt verbalizes understanding of follow up, incision care, and mediations to take for pain control. Pt's pain controlled, vitals wnl, diet tolerated, ambulating with no distress noted, labs wnl. Pt's mother @ bedside to take her home.

## 2023-01-19 NOTE — DISCHARGE INSTRUCTIONS
Post Op Instructions     WOUND CARE  -- You have skin glue over your incision(s); this will slowly flake away over the next few weeks. Do not pick at surgical glue, it will come off on its own.   -- Ok to shower; however, no baths or submerging in water (I.e. swimming, submerging in water) for at least two weeks.  -- Please keep the incision clean with soap and water, pat your incision dry, do not scrub hard over your incisions.     MEDICATIONS AND PAIN CONTROL  -- Please resume all home medications as instructed and take any newly prescribed medications.  -- Most people find that over-the-counter pain medications (Tylenol combined with Ibuprofen) will be sufficient for most pain control.  -- You have been prescribed a narcotic pain medication. Please wean narcotic over the next few daysIf you're taking prescription narcotics, do not drive or operate heavy machinery. Do not drive if your pain is not controlled enough for you to react quickly safely.  -- No straining, avoid constipation. May take OTC stool softeners as described and laxatives as needed.     OTHER INSTRUCTIONS  -- Monitor for temp > 101 F, bleeding, redness, purulent drainage, or any sudden, new extreme pain. If any occur, please call our clinic or go to the emergency department if after normal business hours.  -- You may resume your regular diet as tolerated.   -- No heavy lifting (anything >10 lbs or = to a gallon of milk) or strenuous exercise until cleared by a physician. No pushing or pulling activities such as vacuuming or raking. Otherwise, activity as tolerated.   -- Follow up with Dr. Romano in 2 weeks in clinic for a post-op check. Message sent to clinic for scheduling. Clinic # is [phone]

## 2023-01-20 NOTE — DISCHARGE SUMMARY
[facility]  Discharge Summary      Patient Name: Brooke Moss  MRN: [id number]  ARLENE: [id number]  Patient Class: IP- Inpatient  Admission Date: 1/14/2023  Hospital Length of Stay: 5 days  Discharge Date and Time: 1/19/2023 12:35 PM  Attending Physician: Vandana Shafer MD   Discharging Provider: Vandana Shafer MD  Primary Care Provider: Primary Doctor [facility] Medicine Team: [facility] A Vandana Shafer MD  Primary Care Team: [facility] A    HPI:   Brooke Moss is a 47 y.o. female who presents with abdominal pain. Patient states that pain woke her up from sleep on Friday and was RUQ and sharp, with radiations to back. She was diaphoretic and felt chills. It persisted throughout the day and then she started having nausea and vomiting. Reported vomiting ~12 times, unable to tolerate anything po. Did not see any blood. No changes in bowel habits. After vomiting so much, she started to have burning epigastric pain but the abdominal pain did improve a little bit, and she was able to sleep some Friday night. However, pain returned this morning and she presented to ER. She denies having had fever, chest pain, shortness of breath, urinary changes, lower extremity edema. She reports social EtOH use, with no recent increase in consumption, no changes in foods. She reports her sister recently had her gallbladder removed. Denies previous history of similar pain.       Procedure(s) (LRB):  ERCP (ENDOSCOPIC RETROGRADE CHOLANGIOPANCREATOGRAPHY) (N/A)  ULTRASOUND, UPPER GI TRACT, ENDOSCOPIC (N/A)      Hospital Course:   Ms. Moss was admitted to Hospital Medicine for evaluation of abdominal pain associated with elevated liver transaminases. Ultrasound with cholelithiasis and gallbladder sludge with upper limits of normal wall thickness and reportedly positive sonographic Shell sign concerning for acute cholecystitis, although no pericholecystic fluid or wall hyperemia at this time.   There is suspected superimposed gallbladder adenomyomatosis. General Surgery and [facility] were consulted. MRCP was performed, which showed cholelithiasis, biliary sludge, and gallbladder adenomyomatosis.  No evidence of choledocholithiasis. Mild dilatation of the extra hepatic common bile duct, similar to findings on recent ultrasound. EUS/ERCP was performed by [facility] on 1/17 with a distal CBD stone causing an obstruction, complete removal was accomplished by biliary sphincterotomy and balloon extraction, the biliary tree was swept several times with clearance of the duct achieved, significant amount of sludge and all stones were removed. LFTs improved post-procedurally. General Surgery performed a laparoscopic cholecystectomy on 1/18.     She will follow up in approximately 2 weeks in General Surgery clinic. She does not have a Primary Care provider, so a referral to Internal Medicine was provided. She was given verbal and written instructions on post-op wound care, return precautions, and follow up plans. Her mother was present at the bedside. All questions answered.     Patient and family provided with the Hospital [facility] brochure with my business card. Brochure & philosophy of care reviewed, including the [facility] values, the scope of Hospital Medicine, the role of Advance Practice Providers, and the role Residents/[place] at our large [facility]. Feedback requested regarding quality of the hospital stay so far and any issues that I can address.  Discharged to home in good condition.          Goals of Care Treatment Preferences:  Code Status: Full Code      Consults:   Consults (From admission, onward)        Status Ordering Provider     Inpatient consult to [facility]  Once        Provider:  (Not yet assigned)    Completed PETER JORGE     Inpatient consult to General surgery  Once        Provider:  (Not yet assigned)    Completed PETER JORGE          Final Active  Diagnoses:    Diagnosis Date Noted POA    PRINCIPAL PROBLEM:  Choledocholithiasis with obstruction [K80.51] 01/18/2023 Yes    Severe obesity (BMI >= 40) [E66.01] 01/19/2023 Yes     Chronic      Problems Resolved During this Admission:    Diagnosis Date Noted Date Resolved POA    Calculus of gallbladder with biliary obstruction but without cholecystitis [K80.21] 01/19/2023 01/19/2023 Yes    Elevated liver transaminase level [R74.01] 01/18/2023 01/19/2023 Yes    Hyperbilirubinemia [E80.6] 01/17/2023 01/19/2023 Yes    Right upper quadrant abdominal pain [R10.11] 01/14/2023 01/19/2023 Yes       Discharged Condition: good    Disposition: Home or Self Care    Follow Up:   Follow-up Information     Franco Medeiros MD Follow up on 1/27/2023.    Why: F/U appointment 10:00 AM  Contact information:  [facility] Internal Medicine   [address] [place] [id number]   [phone]                     Future Appointments   Date Time Provider Department Center   1/27/2023 10:00 AM Franco Medeiros MD [facility]   2/1/2023  9:15 AM Mary Cox MD [facility]         Patient Instructions:      Ambulatory referral/consult to Internal Medicine   Standing Status: Future   Referral Priority: Routine Referral Type: Consultation   Referral Reason: Specialty Services Required   Requested Specialty: Internal Medicine   Number of Visits Requested: 1     Ambulatory referral/consult to General Surgery   Standing Status: Future   Referral Priority: Routine Referral Type: Consultation   Referral Reason: Specialty Services Required   Referred to Provider: MARY COX Requested Specialty: General Surgery   Number of Visits Requested: 1     Diet Adult Regular     Notify your health care provider if you experience any of the following:  temperature >100.4     Notify your health care provider if you experience any of the following:  persistent nausea and vomiting or diarrhea     Notify your health  care provider if you experience any of the following:  severe uncontrolled pain     Notify your health care provider if you experience any of the following:  redness, tenderness, or signs of infection (pain, swelling, redness, odor or green/yellow discharge around incision site)     Activity as tolerated       Significant Diagnostic Studies: Labs:   CMP   Recent Labs   Lab 01/18/23 0814 01/19/23  0716    137   K 3.6 3.3*    106   CO2 19* 23   GLU 99 95   BUN 7 5*   CREATININE 0.8 0.8   CALCIUM 9.2 8.6*   PROT 7.2 6.4   ALBUMIN 3.1* 2.8*   BILITOT 2.5* 1.7*   ALKPHOS 188* 150*   * 90*   * 385*   ANIONGAP 12 8    and CBC   Recent Labs   Lab 01/18/23 0814 01/19/23  0716   WBC 7.83 9.61   HGB 14.1 12.6   HCT 43.7 39.6    234       Pending Diagnostic Studies:     Procedure Component Value Units Date/Time    Specimen to Pathology, Surgery General Surgery [[id number]] Collected: 01/18/23 1540    Order Status: Sent Lab Status: In process Updated: 01/18/23 1724    Specimen: Tissue          Medications:  Reconciled Home Medications:      Medication List      CONTINUE taking these medications    azelastine 137 mcg (0.1 %) nasal spray  Commonly known as: ASTELIN  2 sprays (274 mcg total) by Nasal route 2 (two) times daily.     methocarbamoL 500 MG Tab  Commonly known as: ROBAXIN  Take 1 tablet (500 mg total) by mouth nightly as needed (back pain).        STOP taking these medications    mupirocin 2 % ointment  Commonly known as: BACTROBAN            Indwelling Lines/Drains at time of discharge:   Lines/Drains/Airways     None                 Time spent on the discharge of patient: 40 minutes     Vandana Shafer MD, MPH, FACP  Senior Physician, Department of Hospital Medicine  [facility]  [address] [place], [place]

## 2023-01-24 LAB
FINAL PATHOLOGIC DIAGNOSIS: NORMAL
Lab: NORMAL

## 2023-01-27 ENCOUNTER — LAB VISIT (OUTPATIENT)
Dept: LAB | Facility: HOSPITAL | Age: 48
End: 2023-01-27
Attending: INTERNAL MEDICINE
Payer: COMMERCIAL

## 2023-01-27 ENCOUNTER — OFFICE VISIT (OUTPATIENT)
Dept: INTERNAL MEDICINE | Facility: CLINIC | Age: 48
End: 2023-01-27
Payer: COMMERCIAL

## 2023-01-27 VITALS
BODY MASS INDEX: 40.93 KG/M2 | TEMPERATURE: 98 F | HEART RATE: 92 BPM | WEIGHT: 260.81 LBS | SYSTOLIC BLOOD PRESSURE: 122 MMHG | RESPIRATION RATE: 15 BRPM | OXYGEN SATURATION: 97 % | DIASTOLIC BLOOD PRESSURE: 80 MMHG | HEIGHT: 67 IN

## 2023-01-27 DIAGNOSIS — Z12.31 VISIT FOR SCREENING MAMMOGRAM: ICD-10-CM

## 2023-01-27 DIAGNOSIS — K57.92 DIVERTICULITIS: ICD-10-CM

## 2023-01-27 DIAGNOSIS — Z00.00 ANNUAL PHYSICAL EXAM: ICD-10-CM

## 2023-01-27 DIAGNOSIS — Z09 HOSPITAL DISCHARGE FOLLOW-UP: ICD-10-CM

## 2023-01-27 DIAGNOSIS — E01.0 THYROMEGALY: ICD-10-CM

## 2023-01-27 DIAGNOSIS — Z12.11 SCREEN FOR COLON CANCER: ICD-10-CM

## 2023-01-27 DIAGNOSIS — Z00.00 ANNUAL PHYSICAL EXAM: Primary | ICD-10-CM

## 2023-01-27 LAB
ALBUMIN SERPL BCP-MCNC: 3.6 G/DL (ref 3.5–5.2)
ALP SERPL-CCNC: 117 U/L (ref 55–135)
ALT SERPL W/O P-5'-P-CCNC: 78 U/L (ref 10–44)
ANION GAP SERPL CALC-SCNC: 13 MMOL/L (ref 8–16)
AST SERPL-CCNC: 27 U/L (ref 10–40)
BILIRUB SERPL-MCNC: 1 MG/DL (ref 0.1–1)
BUN SERPL-MCNC: 12 MG/DL (ref 6–20)
CALCIUM SERPL-MCNC: 10.1 MG/DL (ref 8.7–10.5)
CHLORIDE SERPL-SCNC: 103 MMOL/L (ref 95–110)
CHOLEST SERPL-MCNC: 245 MG/DL (ref 120–199)
CHOLEST/HDLC SERPL: 5.8 {RATIO} (ref 2–5)
CO2 SERPL-SCNC: 21 MMOL/L (ref 23–29)
CREAT SERPL-MCNC: 0.8 MG/DL (ref 0.5–1.4)
EST. GFR  (NO RACE VARIABLE): >60 ML/MIN/1.73 M^2
ESTIMATED AVG GLUCOSE: 111 MG/DL (ref 68–131)
GLUCOSE SERPL-MCNC: 92 MG/DL (ref 70–110)
HBA1C MFR BLD: 5.5 % (ref 4–5.6)
HDLC SERPL-MCNC: 42 MG/DL (ref 40–75)
HDLC SERPL: 17.1 % (ref 20–50)
LDLC SERPL CALC-MCNC: 182.2 MG/DL (ref 63–159)
NONHDLC SERPL-MCNC: 203 MG/DL
POTASSIUM SERPL-SCNC: 4.4 MMOL/L (ref 3.5–5.1)
PROT SERPL-MCNC: 8.1 G/DL (ref 6–8.4)
SODIUM SERPL-SCNC: 137 MMOL/L (ref 136–145)
TRIGL SERPL-MCNC: 104 MG/DL (ref 30–150)
TSH SERPL DL<=0.005 MIU/L-ACNC: 1.3 UIU/ML (ref 0.4–4)

## 2023-01-27 PROCEDURE — 80061 LIPID PANEL: CPT | Performed by: INTERNAL MEDICINE

## 2023-01-27 PROCEDURE — 3008F PR BODY MASS INDEX (BMI) DOCUMENTED: ICD-10-PCS | Mod: CPTII,S$GLB,, | Performed by: INTERNAL MEDICINE

## 2023-01-27 PROCEDURE — 84443 ASSAY THYROID STIM HORMONE: CPT | Performed by: INTERNAL MEDICINE

## 2023-01-27 PROCEDURE — 3074F PR MOST RECENT SYSTOLIC BLOOD PRESSURE < 130 MM HG: ICD-10-PCS | Mod: CPTII,S$GLB,, | Performed by: INTERNAL MEDICINE

## 2023-01-27 PROCEDURE — 80053 COMPREHEN METABOLIC PANEL: CPT | Performed by: INTERNAL MEDICINE

## 2023-01-27 PROCEDURE — 1160F PR REVIEW ALL MEDS BY PRESCRIBER/CLIN PHARMACIST DOCUMENTED: ICD-10-PCS | Mod: CPTII,S$GLB,, | Performed by: INTERNAL MEDICINE

## 2023-01-27 PROCEDURE — 99999 PR PBB SHADOW E&M-EST. PATIENT-LVL V: ICD-10-PCS | Mod: PBBFAC,,, | Performed by: INTERNAL MEDICINE

## 2023-01-27 PROCEDURE — 1160F RVW MEDS BY RX/DR IN RCRD: CPT | Mod: CPTII,S$GLB,, | Performed by: INTERNAL MEDICINE

## 2023-01-27 PROCEDURE — 99386 PR PREVENTIVE VISIT,NEW,40-64: ICD-10-PCS | Mod: 25,S$GLB,, | Performed by: INTERNAL MEDICINE

## 2023-01-27 PROCEDURE — 3079F DIAST BP 80-89 MM HG: CPT | Mod: CPTII,S$GLB,, | Performed by: INTERNAL MEDICINE

## 2023-01-27 PROCEDURE — 1159F MED LIST DOCD IN RCRD: CPT | Mod: CPTII,S$GLB,, | Performed by: INTERNAL MEDICINE

## 2023-01-27 PROCEDURE — 99999 PR PBB SHADOW E&M-EST. PATIENT-LVL V: CPT | Mod: PBBFAC,,, | Performed by: INTERNAL MEDICINE

## 2023-01-27 PROCEDURE — 90686 FLU VACCINE (QUAD) GREATER THAN OR EQUAL TO 3YO PRESERVATIVE FREE IM: ICD-10-PCS | Mod: S$GLB,,, | Performed by: INTERNAL MEDICINE

## 2023-01-27 PROCEDURE — 90471 FLU VACCINE (QUAD) GREATER THAN OR EQUAL TO 3YO PRESERVATIVE FREE IM: ICD-10-PCS | Mod: S$GLB,,, | Performed by: INTERNAL MEDICINE

## 2023-01-27 PROCEDURE — 3008F BODY MASS INDEX DOCD: CPT | Mod: CPTII,S$GLB,, | Performed by: INTERNAL MEDICINE

## 2023-01-27 PROCEDURE — 83036 HEMOGLOBIN GLYCOSYLATED A1C: CPT | Performed by: INTERNAL MEDICINE

## 2023-01-27 PROCEDURE — 1159F PR MEDICATION LIST DOCUMENTED IN MEDICAL RECORD: ICD-10-PCS | Mod: CPTII,S$GLB,, | Performed by: INTERNAL MEDICINE

## 2023-01-27 PROCEDURE — 1111F DSCHRG MED/CURRENT MED MERGE: CPT | Mod: CPTII,S$GLB,, | Performed by: INTERNAL MEDICINE

## 2023-01-27 PROCEDURE — 90686 IIV4 VACC NO PRSV 0.5 ML IM: CPT | Mod: S$GLB,,, | Performed by: INTERNAL MEDICINE

## 2023-01-27 PROCEDURE — 3079F PR MOST RECENT DIASTOLIC BLOOD PRESSURE 80-89 MM HG: ICD-10-PCS | Mod: CPTII,S$GLB,, | Performed by: INTERNAL MEDICINE

## 2023-01-27 PROCEDURE — 99386 PREV VISIT NEW AGE 40-64: CPT | Mod: 25,S$GLB,, | Performed by: INTERNAL MEDICINE

## 2023-01-27 PROCEDURE — 36415 COLL VENOUS BLD VENIPUNCTURE: CPT | Mod: PO | Performed by: INTERNAL MEDICINE

## 2023-01-27 PROCEDURE — 3074F SYST BP LT 130 MM HG: CPT | Mod: CPTII,S$GLB,, | Performed by: INTERNAL MEDICINE

## 2023-01-27 PROCEDURE — 90471 IMMUNIZATION ADMIN: CPT | Mod: S$GLB,,, | Performed by: INTERNAL MEDICINE

## 2023-01-27 PROCEDURE — 1111F PR DISCHARGE MEDS RECONCILED W/ CURRENT OUTPATIENT MED LIST: ICD-10-PCS | Mod: CPTII,S$GLB,, | Performed by: INTERNAL MEDICINE

## 2023-01-27 RX ORDER — VARICELLA-ZOSTER GE VAC,2 OF 2 50 MCG
1 VIAL (EA) INTRAMUSCULAR ONCE
Qty: 1 EACH | Refills: 1 | Status: SHIPPED | OUTPATIENT
Start: 2023-01-27 | End: 2023-01-27

## 2023-01-27 NOTE — PROGRESS NOTES
Subjective:       Patient ID: Brooke Moss is a 48 y.o. female.    Chief Complaint: Follow-up    HPI    48 y.o. female here for annual exam.     Cholesterol: needs  Vaccines: Influenza - needs; Tetanus - 2021; Zoster - needs; COVID - 3 done  Sexual Screening: not active  STD screening: not active  Eye exam: Done last 6 months ago.  Done once a year.  Mammogram: needs  Gyn exam: needs  Colonoscopy: needs; about 5 yrs ago, she was diagnosed with diverticulitis and never went back.  A1c: needs    Exercise: no regular exercise.  Has a herniated disk.  Stretches in the am and does yoga for her back.  Diet: home cooked.  No fast food.  For the past 4 months, she has tried to eat balanced meals.    History reviewed. No pertinent past medical history.  Past Surgical History:   Procedure Laterality Date    ADENOIDECTOMY      CHOLECYSTECTOMY  1/18/2023    ENDOSCOPIC ULTRASOUND OF UPPER GASTROINTESTINAL TRACT N/A 01/17/2023    Procedure: ULTRASOUND, UPPER GI TRACT, ENDOSCOPIC;  Surgeon: Opal Prado MD;  Location: [address]);  Service: Endoscopy;  Laterality: N/A;    ENDOSCOPIC ULTRASOUND OF UPPER GASTROINTESTINAL TRACT N/A 01/16/2023    Procedure: ULTRASOUND, UPPER GI TRACT, ENDOSCOPIC;  Surgeon: Opal Prado MD;  Location: [address]);  Service: Endoscopy;  Laterality: N/A;    ERCP N/A 01/17/2023    Procedure: ERCP (ENDOSCOPIC RETROGRADE CHOLANGIOPANCREATOGRAPHY);  Surgeon: Opal Prado MD;  Location: [address]);  Service: Endoscopy;  Laterality: N/A;    ERCP N/A 01/16/2023    Procedure: ERCP (ENDOSCOPIC RETROGRADE CHOLANGIOPANCREATOGRAPHY);  Surgeon: Opal Prado MD;  Location: [facility] ([address] [place] [address]);  Service: Endoscopy;  Laterality: N/A;    INCISION AND DRAINAGE, PILONIDAL CYST, SIMPLE  02/19/2021    LAPAROSCOPIC CHOLECYSTECTOMY N/A 01/18/2023    Procedure: CHOLECYSTECTOMY, LAPAROSCOPIC;  Surgeon: Satish Romano MD;  Location: [address];  Service: General;  Laterality: N/A;     TONSILLECTOMY       Social History     Socioeconomic History    Marital status: Single   Tobacco Use    Smoking status: Every Day     Packs/day: 0.25     Types: Cigarettes, Vaping with nicotine    Smokeless tobacco: Never   Substance and Sexual Activity    Alcohol use: Yes     Comment: once a week or two at most.    Drug use: Yes     Types: Marijuana    Sexual activity: Not Currently     Birth control/protection: None     Review of patient's allergies indicates:   Allergen Reactions    Iodine and iodide containing products     Shellfish containing products      Brooke Moss had no medications administered during this visit.    48-year-old female here for hospital follow-up.    Family and/or Caretaker present at visit?  No.  Diagnostic tests reviewed/disposition: I have reviewed all completed as well as pending diagnostic tests at the time of discharge.  Disease/illness education: Choledocholithiasis  Home health/community services discussion/referrals: Patient does not have home health established from hospital visit.  They do not need home health.  If needed, we will set up home health for the patient.   Establishment or re-establishment of referral orders for community resources: No other necessary community resources.   Discussion with other health care providers: No discussion with other health care providers necessary.  I reviewed and reconciled the medications from hospital discharge.    She had a stone in her gallbladder that was removed via ERCP.    Discharge summary:  HPI:   Brooke Moss is a 47 y.o. female who presents with abdominal pain. Patient states that pain woke her up from sleep on Friday and was RUQ and sharp, with radiations to back. She was diaphoretic and felt chills. It persisted throughout the day and then she started having nausea and vomiting. Reported vomiting ~12 times, unable to tolerate anything po. Did not see any blood. No changes in bowel habits. After vomiting so much,  she started to have burning epigastric pain but the abdominal pain did improve a little bit, and she was able to sleep some Friday night. However, pain returned this morning and she presented to ER. She denies having had fever, chest pain, shortness of breath, urinary changes, lower extremity edema. She reports social EtOH use, with no recent increase in consumption, no changes in foods. She reports her sister recently had her gallbladder removed. Denies previous history of similar pain.     Hospital Course:   Ms. Moss was admitted to Hospital Medicine for evaluation of abdominal pain associated with elevated liver transaminases. Ultrasound with cholelithiasis and gallbladder sludge with upper limits of normal wall thickness and reportedly positive sonographic Shell sign concerning for acute cholecystitis, although no pericholecystic fluid or wall hyperemia at this time.  There is suspected superimposed gallbladder adenomyomatosis. General Surgery and [facility] were consulted. MRCP was performed, which showed cholelithiasis, biliary sludge, and gallbladder adenomyomatosis.  No evidence of choledocholithiasis. Mild dilatation of the extra hepatic common bile duct, similar to findings on recent ultrasound. EUS/ERCP was performed by [facility] on 1/17 with a distal CBD stone causing an obstruction, complete removal was accomplished by biliary sphincterotomy and balloon extraction, the biliary tree was swept several times with clearance of the duct achieved, significant amount of sludge and all stones were removed. LFTs improved post-procedurally. General Surgery performed a laparoscopic cholecystectomy on 1/18.      She will follow up in approximately 2 weeks in General Surgery clinic. She does not have a Primary Care provider, so a referral to Internal Medicine was provided. She was given verbal and written instructions on post-op wound care, return precautions, and follow up plans. Her mother was present at the bedside. All  questions answered.      Patient and family provided with the Hospital [facility] brochure with my business card. Brochure & philosophy of care reviewed, including the [facility] values, the scope of Hospital Medicine, the role of Advance Practice Providers, and the role Residents/[place] at our large [facility]. Feedback requested regarding quality of the hospital stay so far and any issues that I can address.  Discharged to home in good condition.     Review of Systems      Objective:      Physical Exam  Vitals reviewed.   Constitutional:       Appearance: She is well-developed.   HENT:      Head: Normocephalic and atraumatic.      Mouth/Throat:      Pharynx: No oropharyngeal exudate.   Eyes:      General: No scleral icterus.        Right eye: No discharge.         Left eye: No discharge.      Pupils: Pupils are equal, round, and reactive to light.   Neck:      Thyroid: Thyromegaly present.      Trachea: No tracheal deviation.   Cardiovascular:      Rate and Rhythm: Normal rate and regular rhythm.      Heart sounds: Normal heart sounds. No murmur heard.    No friction rub. No gallop.   Pulmonary:      Effort: Pulmonary effort is normal. No respiratory distress.      Breath sounds: Normal breath sounds. No wheezing or rales.   Chest:      Chest wall: No tenderness.   Abdominal:      General: Bowel sounds are normal. There is no distension.      Palpations: Abdomen is soft. There is no mass.      Tenderness: There is no abdominal tenderness. There is no guarding or rebound.   Musculoskeletal:         General: No tenderness. Normal range of motion.      Cervical back: Normal range of motion and neck supple.   Skin:     General: Skin is warm and dry.      Coloration: Skin is not pale.      Findings: No erythema or rash.   Neurological:      Mental Status: She is alert and oriented to person, place, and time.   Psychiatric:         Behavior: Behavior normal.       Assessment:       1. Annual  physical exam  - Ambulatory referral/consult to Obstetrics / Gynecology; Future  - Comprehensive Metabolic Panel; Future  - TSH; Future  - Lipid Panel; Future  - Hemoglobin A1C; Future    2. Hospital discharge follow-up    3. Visit for screening mammogram  - Mammo Digital Screening Bilat; Future    4. Screen for colon cancer  - Ambulatory referral/consult to Endo Procedure ; Future    5. Diverticulitis    6. Thyromegaly  - US Soft Tissue Head Neck Thyroid; Future      Plan:       1. Check CMP, TSH, lipids, A1c.  Discussed diet and exercise.  Up-to-date on vaccines.  Flu vaccine given today.    .  Follow-up with surgery as planned.  3. Check mammogram.  4.  Refer for colonoscopy.  5.  Monitor.  Not currently active.  6. Check thyroid ultrasound.

## 2023-02-01 ENCOUNTER — OFFICE VISIT (OUTPATIENT)
Dept: SURGERY | Facility: CLINIC | Age: 48
End: 2023-02-01
Payer: COMMERCIAL

## 2023-02-01 VITALS
BODY MASS INDEX: 40.67 KG/M2 | WEIGHT: 259.13 LBS | DIASTOLIC BLOOD PRESSURE: 77 MMHG | SYSTOLIC BLOOD PRESSURE: 130 MMHG | HEIGHT: 67 IN | OXYGEN SATURATION: 96 % | HEART RATE: 93 BPM

## 2023-02-01 DIAGNOSIS — Z90.49 S/P LAPAROSCOPIC CHOLECYSTECTOMY: Primary | ICD-10-CM

## 2023-02-01 PROCEDURE — 3078F PR MOST RECENT DIASTOLIC BLOOD PRESSURE < 80 MM HG: ICD-10-PCS | Mod: CPTII,S$GLB,, | Performed by: STUDENT IN AN ORGANIZED HEALTH CARE EDUCATION/TRAINING PROGRAM

## 2023-02-01 PROCEDURE — 3078F DIAST BP <80 MM HG: CPT | Mod: CPTII,S$GLB,, | Performed by: STUDENT IN AN ORGANIZED HEALTH CARE EDUCATION/TRAINING PROGRAM

## 2023-02-01 PROCEDURE — 3075F PR MOST RECENT SYSTOLIC BLOOD PRESS GE 130-139MM HG: ICD-10-PCS | Mod: CPTII,S$GLB,, | Performed by: STUDENT IN AN ORGANIZED HEALTH CARE EDUCATION/TRAINING PROGRAM

## 2023-02-01 PROCEDURE — 99999 PR PBB SHADOW E&M-EST. PATIENT-LVL III: ICD-10-PCS | Mod: PBBFAC,,, | Performed by: STUDENT IN AN ORGANIZED HEALTH CARE EDUCATION/TRAINING PROGRAM

## 2023-02-01 PROCEDURE — 3044F HG A1C LEVEL LT 7.0%: CPT | Mod: CPTII,S$GLB,, | Performed by: STUDENT IN AN ORGANIZED HEALTH CARE EDUCATION/TRAINING PROGRAM

## 2023-02-01 PROCEDURE — 99024 PR POST-OP FOLLOW-UP VISIT: ICD-10-PCS | Mod: S$GLB,,, | Performed by: STUDENT IN AN ORGANIZED HEALTH CARE EDUCATION/TRAINING PROGRAM

## 2023-02-01 PROCEDURE — 1159F MED LIST DOCD IN RCRD: CPT | Mod: CPTII,S$GLB,, | Performed by: STUDENT IN AN ORGANIZED HEALTH CARE EDUCATION/TRAINING PROGRAM

## 2023-02-01 PROCEDURE — 1160F PR REVIEW ALL MEDS BY PRESCRIBER/CLIN PHARMACIST DOCUMENTED: ICD-10-PCS | Mod: CPTII,S$GLB,, | Performed by: STUDENT IN AN ORGANIZED HEALTH CARE EDUCATION/TRAINING PROGRAM

## 2023-02-01 PROCEDURE — 3008F BODY MASS INDEX DOCD: CPT | Mod: CPTII,S$GLB,, | Performed by: STUDENT IN AN ORGANIZED HEALTH CARE EDUCATION/TRAINING PROGRAM

## 2023-02-01 PROCEDURE — 99024 POSTOP FOLLOW-UP VISIT: CPT | Mod: S$GLB,,, | Performed by: STUDENT IN AN ORGANIZED HEALTH CARE EDUCATION/TRAINING PROGRAM

## 2023-02-01 PROCEDURE — 3075F SYST BP GE 130 - 139MM HG: CPT | Mod: CPTII,S$GLB,, | Performed by: STUDENT IN AN ORGANIZED HEALTH CARE EDUCATION/TRAINING PROGRAM

## 2023-02-01 PROCEDURE — 1159F PR MEDICATION LIST DOCUMENTED IN MEDICAL RECORD: ICD-10-PCS | Mod: CPTII,S$GLB,, | Performed by: STUDENT IN AN ORGANIZED HEALTH CARE EDUCATION/TRAINING PROGRAM

## 2023-02-01 PROCEDURE — 1160F RVW MEDS BY RX/DR IN RCRD: CPT | Mod: CPTII,S$GLB,, | Performed by: STUDENT IN AN ORGANIZED HEALTH CARE EDUCATION/TRAINING PROGRAM

## 2023-02-01 PROCEDURE — 99999 PR PBB SHADOW E&M-EST. PATIENT-LVL III: CPT | Mod: PBBFAC,,, | Performed by: STUDENT IN AN ORGANIZED HEALTH CARE EDUCATION/TRAINING PROGRAM

## 2023-02-01 PROCEDURE — 3044F PR MOST RECENT HEMOGLOBIN A1C LEVEL <7.0%: ICD-10-PCS | Mod: CPTII,S$GLB,, | Performed by: STUDENT IN AN ORGANIZED HEALTH CARE EDUCATION/TRAINING PROGRAM

## 2023-02-01 PROCEDURE — 3008F PR BODY MASS INDEX (BMI) DOCUMENTED: ICD-10-PCS | Mod: CPTII,S$GLB,, | Performed by: STUDENT IN AN ORGANIZED HEALTH CARE EDUCATION/TRAINING PROGRAM

## 2023-02-01 NOTE — PROGRESS NOTES
"[facility]  General Surgery  Post-Operative Note    Subjective:      Brooke Moss is a 48 year-old woman who presents to the clinic 2 weeks following lap cholecystectomy for choledocholithiasis. She underwent an ERCP while admitted had clearance of her duct when a distal CBD stone was found. She is eating a regular diet without difficulty. Bowel movements are  normal .  The patient is not having any pain.     Objective:      /77 (BP Location: Left arm, Patient Position: Sitting)   Pulse 93   Ht 5' 7" (1.702 m)   Wt 117.5 kg (259 lb 2.4 oz)   LMP 01/16/2023 (Exact Date)   SpO2 96%   BMI 40.59 kg/m²     General:  alert, appears stated age, and cooperative   Abdomen: soft, bowel sounds active, non-tender   Incision:   healing well, no drainage, no erythema, no hernia, no seroma, no swelling, no dehiscence, incision well approximated      Pathology:  RELIAPATH DIAGNOSIS:   GALLBLADDER, CHOLECYSTECTOMY:   -Chronic cholecystitis and cholesterolosis.    Assessment:     Brooke Moss is a 48 year-old woman doing well s/p lap cholecystectomy for choledocholithiasis    Plan:   1. Continue any current medications.  2. Wound care discussed.  3. Return to full duty in 4 weeks.  4. Follow up as needed.      Satish Romano MD  General Surgery and Surgical Critical Care  [facility]  "

## 2023-02-02 ENCOUNTER — HOSPITAL ENCOUNTER (OUTPATIENT)
Dept: RADIOLOGY | Facility: HOSPITAL | Age: 48
Discharge: HOME | End: 2023-02-02
Attending: INTERNAL MEDICINE
Payer: COMMERCIAL

## 2023-02-02 DIAGNOSIS — Z12.31 VISIT FOR SCREENING MAMMOGRAM: ICD-10-CM

## 2023-02-02 PROCEDURE — 77067 SCR MAMMO BI INCL CAD: CPT | Mod: 26,,, | Performed by: RADIOLOGY

## 2023-02-02 PROCEDURE — 77063 BREAST TOMOSYNTHESIS BI: CPT | Mod: 26,,, | Performed by: RADIOLOGY

## 2023-02-02 PROCEDURE — 77067 MAMMO DIGITAL SCREENING BILAT WITH TOMO: ICD-10-PCS | Mod: 26,,, | Performed by: RADIOLOGY

## 2023-02-02 PROCEDURE — 77067 SCR MAMMO BI INCL CAD: CPT | Mod: TC

## 2023-02-02 PROCEDURE — 77063 MAMMO DIGITAL SCREENING BILAT WITH TOMO: ICD-10-PCS | Mod: 26,,, | Performed by: RADIOLOGY

## 2023-02-16 ENCOUNTER — HOSPITAL ENCOUNTER (OUTPATIENT)
Dept: RADIOLOGY | Facility: HOSPITAL | Age: 48
Discharge: HOME | End: 2023-02-16
Attending: INTERNAL MEDICINE
Payer: COMMERCIAL

## 2023-02-16 ENCOUNTER — TELEPHONE (OUTPATIENT)
Dept: INTERNAL MEDICINE | Facility: CLINIC | Age: 48
End: 2023-02-16
Payer: COMMERCIAL

## 2023-02-16 ENCOUNTER — OFFICE VISIT (OUTPATIENT)
Dept: OBSTETRICS AND GYNECOLOGY | Facility: CLINIC | Age: 48
End: 2023-02-16
Payer: COMMERCIAL

## 2023-02-16 VITALS
DIASTOLIC BLOOD PRESSURE: 89 MMHG | SYSTOLIC BLOOD PRESSURE: 134 MMHG | BODY MASS INDEX: 40.99 KG/M2 | WEIGHT: 261.69 LBS

## 2023-02-16 DIAGNOSIS — Z01.419 WELL WOMAN EXAM WITH ROUTINE GYNECOLOGICAL EXAM: Primary | ICD-10-CM

## 2023-02-16 DIAGNOSIS — E04.1 THYROID NODULE: Primary | ICD-10-CM

## 2023-02-16 DIAGNOSIS — Z00.00 ANNUAL PHYSICAL EXAM: ICD-10-CM

## 2023-02-16 DIAGNOSIS — E01.0 THYROMEGALY: ICD-10-CM

## 2023-02-16 PROCEDURE — 1111F PR DISCHARGE MEDS RECONCILED W/ CURRENT OUTPATIENT MED LIST: ICD-10-PCS | Mod: CPTII,S$GLB,, | Performed by: STUDENT IN AN ORGANIZED HEALTH CARE EDUCATION/TRAINING PROGRAM

## 2023-02-16 PROCEDURE — 99386 PREV VISIT NEW AGE 40-64: CPT | Mod: S$GLB,,, | Performed by: STUDENT IN AN ORGANIZED HEALTH CARE EDUCATION/TRAINING PROGRAM

## 2023-02-16 PROCEDURE — 3079F PR MOST RECENT DIASTOLIC BLOOD PRESSURE 80-89 MM HG: ICD-10-PCS | Mod: CPTII,S$GLB,, | Performed by: STUDENT IN AN ORGANIZED HEALTH CARE EDUCATION/TRAINING PROGRAM

## 2023-02-16 PROCEDURE — 76536 US EXAM OF HEAD AND NECK: CPT | Mod: 26,,, | Performed by: RADIOLOGY

## 2023-02-16 PROCEDURE — 3079F DIAST BP 80-89 MM HG: CPT | Mod: CPTII,S$GLB,, | Performed by: STUDENT IN AN ORGANIZED HEALTH CARE EDUCATION/TRAINING PROGRAM

## 2023-02-16 PROCEDURE — 1111F DSCHRG MED/CURRENT MED MERGE: CPT | Mod: CPTII,S$GLB,, | Performed by: STUDENT IN AN ORGANIZED HEALTH CARE EDUCATION/TRAINING PROGRAM

## 2023-02-16 PROCEDURE — 3075F PR MOST RECENT SYSTOLIC BLOOD PRESS GE 130-139MM HG: ICD-10-PCS | Mod: CPTII,S$GLB,, | Performed by: STUDENT IN AN ORGANIZED HEALTH CARE EDUCATION/TRAINING PROGRAM

## 2023-02-16 PROCEDURE — 88175 CYTOPATH C/V AUTO FLUID REDO: CPT | Performed by: STUDENT IN AN ORGANIZED HEALTH CARE EDUCATION/TRAINING PROGRAM

## 2023-02-16 PROCEDURE — 1159F PR MEDICATION LIST DOCUMENTED IN MEDICAL RECORD: ICD-10-PCS | Mod: CPTII,S$GLB,, | Performed by: STUDENT IN AN ORGANIZED HEALTH CARE EDUCATION/TRAINING PROGRAM

## 2023-02-16 PROCEDURE — 99999 PR PBB SHADOW E&M-EST. PATIENT-LVL III: ICD-10-PCS | Mod: PBBFAC,,, | Performed by: STUDENT IN AN ORGANIZED HEALTH CARE EDUCATION/TRAINING PROGRAM

## 2023-02-16 PROCEDURE — 1159F MED LIST DOCD IN RCRD: CPT | Mod: CPTII,S$GLB,, | Performed by: STUDENT IN AN ORGANIZED HEALTH CARE EDUCATION/TRAINING PROGRAM

## 2023-02-16 PROCEDURE — 3008F BODY MASS INDEX DOCD: CPT | Mod: CPTII,S$GLB,, | Performed by: STUDENT IN AN ORGANIZED HEALTH CARE EDUCATION/TRAINING PROGRAM

## 2023-02-16 PROCEDURE — 76536 US SOFT TISSUE HEAD NECK THYROID: ICD-10-PCS | Mod: 26,,, | Performed by: RADIOLOGY

## 2023-02-16 PROCEDURE — 87624 HPV HI-RISK TYP POOLED RSLT: CPT | Performed by: STUDENT IN AN ORGANIZED HEALTH CARE EDUCATION/TRAINING PROGRAM

## 2023-02-16 PROCEDURE — 3008F PR BODY MASS INDEX (BMI) DOCUMENTED: ICD-10-PCS | Mod: CPTII,S$GLB,, | Performed by: STUDENT IN AN ORGANIZED HEALTH CARE EDUCATION/TRAINING PROGRAM

## 2023-02-16 PROCEDURE — 3075F SYST BP GE 130 - 139MM HG: CPT | Mod: CPTII,S$GLB,, | Performed by: STUDENT IN AN ORGANIZED HEALTH CARE EDUCATION/TRAINING PROGRAM

## 2023-02-16 PROCEDURE — 99999 PR PBB SHADOW E&M-EST. PATIENT-LVL III: CPT | Mod: PBBFAC,,, | Performed by: STUDENT IN AN ORGANIZED HEALTH CARE EDUCATION/TRAINING PROGRAM

## 2023-02-16 PROCEDURE — 99386 PR PREVENTIVE VISIT,NEW,40-64: ICD-10-PCS | Mod: S$GLB,,, | Performed by: STUDENT IN AN ORGANIZED HEALTH CARE EDUCATION/TRAINING PROGRAM

## 2023-02-16 PROCEDURE — 76536 US EXAM OF HEAD AND NECK: CPT | Mod: TC

## 2023-02-16 PROCEDURE — 3044F PR MOST RECENT HEMOGLOBIN A1C LEVEL <7.0%: ICD-10-PCS | Mod: CPTII,S$GLB,, | Performed by: STUDENT IN AN ORGANIZED HEALTH CARE EDUCATION/TRAINING PROGRAM

## 2023-02-16 PROCEDURE — 3044F HG A1C LEVEL LT 7.0%: CPT | Mod: CPTII,S$GLB,, | Performed by: STUDENT IN AN ORGANIZED HEALTH CARE EDUCATION/TRAINING PROGRAM

## 2023-02-16 NOTE — PROGRESS NOTES
CC: Well woman exam    HPI:  Brooke Moss is a 48 y.o. female  presents for a well woman exam.  She has no issues, problems, or complaints. Reports back pain and breast tenderness with periods, generally treated well with motrin and heat packs.       Patient history:   History reviewed. No pertinent past medical history.  Past Surgical History:   Procedure Laterality Date    ADENOIDECTOMY      CHOLECYSTECTOMY  2023    ENDOSCOPIC ULTRASOUND OF UPPER GASTROINTESTINAL TRACT N/A 2023    Procedure: ULTRASOUND, UPPER GI TRACT, ENDOSCOPIC;  Surgeon: Opal Prado MD;  Location: [facility] ([address] [place] [address]);  Service: Endoscopy;  Laterality: N/A;    ENDOSCOPIC ULTRASOUND OF UPPER GASTROINTESTINAL TRACT N/A 2023    Procedure: ULTRASOUND, UPPER GI TRACT, ENDOSCOPIC;  Surgeon: Opal Prado MD;  Location: [facility] ([address] [place] [address]);  Service: Endoscopy;  Laterality: N/A;    ERCP N/A 2023    Procedure: ERCP (ENDOSCOPIC RETROGRADE CHOLANGIOPANCREATOGRAPHY);  Surgeon: Opal Prado MD;  Location: [facility] ([address] [place] [address]);  Service: Endoscopy;  Laterality: N/A;    ERCP N/A 2023    Procedure: ERCP (ENDOSCOPIC RETROGRADE CHOLANGIOPANCREATOGRAPHY);  Surgeon: Opal Prado MD;  Location: [facility] ([address] [place] [address]);  Service: Endoscopy;  Laterality: N/A;    INCISION AND DRAINAGE, PILONIDAL CYST, SIMPLE  2021    LAPAROSCOPIC CHOLECYSTECTOMY N/A 2023    Procedure: CHOLECYSTECTOMY, LAPAROSCOPIC;  Surgeon: Satish Romano MD;  Location: [address];  Service: General;  Laterality: N/A;    TONSILLECTOMY       OB History    Para Term  AB Living   2       2     SAB IAB Ectopic Multiple Live Births     2            # Outcome Date GA Lbr Ariel/2nd Weight Sex Delivery Anes PTL Lv   2 IAB            1 IAB                GYN  Menopausal: No  History of abnormal paps: DENIES  Abnormal or postmenopausal bleeding: DENIES  History of abnormal mammograms:DENIES   Family history of breast or ovarian  cancer: DENIES  Any breast masses, pain, skin changes, or nipple discharge: DENIES  Possible recent STD exposure: denies  Contraception: None    Pap: No result found, Done today  Mammogram: BIRADS1, T-C=12.57%      Family History   Problem Relation Age of Onset    Cancer Maternal Grandmother      Social History     Tobacco Use    Smoking status: Every Day     Packs/day: 0.25     Types: Cigarettes, Vaping with nicotine    Smokeless tobacco: Never   Substance Use Topics    Alcohol use: Yes     Comment: once a week or two at most.    Drug use: Yes     Types: Marijuana     Allergies: Iodine and iodide containing products and Shellfish containing products    Current Outpatient Medications:     azelastine (ASTELIN) 137 mcg (0.1 %) nasal spray, 2 sprays (274 mcg total) by Nasal route 2 (two) times daily., Disp: 30 mL, Rfl: 0    methocarbamoL (ROBAXIN) 500 MG Tab, Take 1 tablet (500 mg total) by mouth nightly as needed (back pain)., Disp: 15 tablet, Rfl: 0       ROS:  GENERAL: Denies weight gain or weight loss. Feeling well overall.   SKIN: Denies rash or lesions.   HEAD: Denies head injury or headache.   NODES: Denies enlarged lymph nodes.   CHEST: Denies chest pain or shortness of breath.   CARDIOVASCULAR: Denies palpitations or left sided chest pain.   ABDOMEN: No abdominal pain, constipation, diarrhea, nausea, vomiting or rectal bleeding.   URINARY: No frequency, dysuria, hematuria, or burning on urination.  REPRODUCTIVE: See HPI.   BREASTS: The patient performs breast self-examination and denies pain, lumps, or nipple discharge.   HEMATOLOGIC: No easy bruisability or excessive bleeding.  MUSCULOSKELETAL: Denies joint pain or swelling.   NEUROLOGIC: Denies syncope or weakness.   PSYCHIATRIC: Denies depression, anxiety or mood swings.    Objective:   /89   Wt 118.7 kg (261 lb 11 oz)   LMP 02/14/2023 (Exact Date)   BMI 40.99 kg/m²       Physical Exam:  APPEARANCE: Well nourished, well developed, in no acute  distress.  AFFECT: WNL, alert and oriented x 3  SKIN: No acne or hirsutism  NECK: Neck symmetric without masses or thyromegaly  NODES: No inguinal, cervical, axillary, or femoral lymph node enlargement  CHEST: Good respiratory effect  ABDOMEN: Soft.  No tenderness or masses.  No hepatosplenomegaly.  No hernias.  BREASTS: Symmetrical, no skin changes or visible lesions.  No palpable masses, nipple discharge bilaterally.  PELVIC: Normal external genitalia without lesions.  Normal hair distribution.  Adequate perineal body, normal urethral meatus.  Vagina moist and well rugated without lesions or discharge. Dark blood noted in vault, patient currently menstruating. Cervix pink, without lesions, discharge or tenderness.  No significant cystocele or rectocele.  Bimanual exam shows uterus to be normal size, regular, mobile and nontender.  Adnexa without masses or tenderness.  EXTREMITIES: No edema.    ASSESSMENT AND PLAN  1. Well woman exam with routine gynecological exam  Liquid-Based Pap Smear, Screening    HPV High Risk Genotypes, PCR      2. Annual physical exam  Ambulatory referral/consult to Obstetrics / Gynecology          Annual exam  Breast and pelvic exam: wnl  Patient counseled on ASCCP guidelines for cervical cytology screening  Cervical screening: completed today   Patient counseled on current recommendations for breast cancer screening  Mammogram screening: up to date  STD testing: not requested today   Osteoporosis screening at 65      She was counseled to follow up with her PCP for other routine health maintenance      Follow up in about 1 year (around 2/16/2024).      Sydnie Kamara MD  OBGYN [facility]

## 2023-02-16 NOTE — TELEPHONE ENCOUNTER
You have thyroid nodules that need to be biopsied.  We also need to check an ultrasound in a year, 2 years, 3 years, and 5 years.  I will place the order for the biopsy for you.

## 2023-02-17 ENCOUNTER — TELEPHONE (OUTPATIENT)
Dept: INTERVENTIONAL RADIOLOGY/VASCULAR | Facility: CLINIC | Age: 48
End: 2023-02-17
Payer: COMMERCIAL

## 2023-03-03 DIAGNOSIS — E04.1 THYROID NODULE: Primary | ICD-10-CM

## 2023-03-13 ENCOUNTER — TELEPHONE (OUTPATIENT)
Dept: INTERVENTIONAL RADIOLOGY/VASCULAR | Facility: HOSPITAL | Age: 48
End: 2023-03-13
Payer: COMMERCIAL

## 2023-03-13 NOTE — NURSING
Patient advised to arrive at 13:00. Advised where to enter and check in. Advised there's no need to fast, to take sheduled medications as usual, and may drive self to procedure.

## 2023-03-14 ENCOUNTER — HOSPITAL ENCOUNTER (OUTPATIENT)
Dept: INTERVENTIONAL RADIOLOGY/VASCULAR | Facility: HOSPITAL | Age: 48
Discharge: HOME | End: 2023-03-14
Attending: RADIOLOGY
Payer: COMMERCIAL

## 2023-03-14 VITALS
WEIGHT: 260 LBS | SYSTOLIC BLOOD PRESSURE: 116 MMHG | OXYGEN SATURATION: 96 % | HEART RATE: 79 BPM | RESPIRATION RATE: 16 BRPM | BODY MASS INDEX: 40.81 KG/M2 | TEMPERATURE: 98 F | HEIGHT: 67 IN | DIASTOLIC BLOOD PRESSURE: 68 MMHG

## 2023-03-14 DIAGNOSIS — E04.1 THYROID NODULE: ICD-10-CM

## 2023-03-14 PROCEDURE — 88177 CYTP FNA EVAL EA ADDL: CPT | Mod: 26,,, | Performed by: PATHOLOGY

## 2023-03-14 PROCEDURE — 25000003 PHARM REV CODE 250: Performed by: RADIOLOGY

## 2023-03-14 PROCEDURE — 10005 FNA BX W/US GDN 1ST LES: CPT | Performed by: RADIOLOGY

## 2023-03-14 PROCEDURE — 88173 CYTOPATH EVAL FNA REPORT: CPT | Performed by: PATHOLOGY

## 2023-03-14 PROCEDURE — 88173 PR  INTERPRETATION OF FNA SMEAR: ICD-10-PCS | Mod: 26,,, | Performed by: PATHOLOGY

## 2023-03-14 PROCEDURE — 88172 CYTP DX EVAL FNA 1ST EA SITE: CPT | Mod: 26,,, | Performed by: PATHOLOGY

## 2023-03-14 PROCEDURE — 88173 CYTOPATH EVAL FNA REPORT: CPT | Mod: 26,,, | Performed by: PATHOLOGY

## 2023-03-14 PROCEDURE — 88172 CYTP DX EVAL FNA 1ST EA SITE: CPT | Performed by: PATHOLOGY

## 2023-03-14 PROCEDURE — 88177 PR  EVALUATION OF FNA SMEAR TO DETERMINE ADEQUACY, EA ADD EVAL: ICD-10-PCS | Mod: 26,,, | Performed by: PATHOLOGY

## 2023-03-14 PROCEDURE — 88177 CYTP FNA EVAL EA ADDL: CPT | Performed by: PATHOLOGY

## 2023-03-14 PROCEDURE — 88172 PR  EVALUATION OF FNA SMEAR TO DETERMINE ADEQUACY, FIRST EVAL: ICD-10-PCS | Mod: 26,,, | Performed by: PATHOLOGY

## 2023-03-14 PROCEDURE — A4215 STERILE NEEDLE: HCPCS

## 2023-03-14 PROCEDURE — 10005 IR US FINE NEEDLE ASPIRATION BIOPSY, FIRST LESION: ICD-10-PCS | Mod: LT,,, | Performed by: RADIOLOGY

## 2023-03-14 PROCEDURE — A4550 SURGICAL TRAYS: HCPCS

## 2023-03-14 RX ORDER — LIDOCAINE HYDROCHLORIDE 10 MG/ML
INJECTION INFILTRATION; PERINEURAL
Status: COMPLETED | OUTPATIENT
Start: 2023-03-14 | End: 2023-03-14

## 2023-03-14 RX ADMIN — LIDOCAINE HYDROCHLORIDE 5 ML: 10 INJECTION, SOLUTION INFILTRATION; PERINEURAL at 02:03

## 2023-03-14 NOTE — PROCEDURES
Interventional Radiology Immediate Post-Procedure Note    Pre-Op Diagnosis: Thyroid nodule  Post-Op Diagnosis: Same    Procedure: US-guided FNA    Procedure performed by: Freddy Cartagena MD  Assistants: None    Estimated Blood Loss: Minimal  Specimen Removed: Yes    Findings/description of procedure:  25-ga FNA x4 taken from a rim-calcified nodule the left lobe of the thyroid. Adequacy of specimen confirmed.    No immediate complications. Patient tolerated procedure well. Please see full dictated procedure report for additional details and recommendations.      Freddy Cartagena MD  [facility]  Pager [phone]

## 2023-03-14 NOTE — SEDATION DOCUMENTATION
IR procedure - Left thyroid biopsy - is complete. Patient tolerated well. Vital signs are stable. Pathology is present to ensure the adequacy of samples and to take custody of the specimens. Patient will return to IR pre/post to complete discharge.

## 2023-03-14 NOTE — NURSING
Patient is discharged from IR. AVS is printed and reviewed. Upcoming appointment and the [facility] number is highlighted for convenience. The opportunity to ask questions is provided. Patient is ambulatory from the unit.

## 2023-03-14 NOTE — DISCHARGE SUMMARY
Interventional Radiology Short Stay Discharge Summary      Admit Date: 3/14/2023  Discharge Date: 03/14/2023     Hospital Course: Uneventful    Discharge Diagnosis: Thyroid nodule s/p FNA today    Discharge Condition: Stable    Discharge Disposition: Home    Diet: Resume prior diet    Activity: Activity as tolerated    Follow-up: With referring provider      Freddy Cartagena MD  [facility]  Pager [phone]

## 2023-03-14 NOTE — H&P
Interventional Radiology Pre-Procedure History & Physical      Chief Complaint/Reason for Referral: Thyroid nodules    History of Present Illness:  Brooke Moss is a 48 y.o. female with thyroid nodules that presents for FNA.    History reviewed. No pertinent past medical history.  Past Surgical History:   Procedure Laterality Date    ADENOIDECTOMY      CHOLECYSTECTOMY  1/18/2023    ENDOSCOPIC ULTRASOUND OF UPPER GASTROINTESTINAL TRACT N/A 01/17/2023    Procedure: ULTRASOUND, UPPER GI TRACT, ENDOSCOPIC;  Surgeon: Opal Prado MD;  Location: [facility] (2ND FLR);  Service: Endoscopy;  Laterality: N/A;    ENDOSCOPIC ULTRASOUND OF UPPER GASTROINTESTINAL TRACT N/A 01/16/2023    Procedure: ULTRASOUND, UPPER GI TRACT, ENDOSCOPIC;  Surgeon: Opal Prado MD;  Location: [facility] (2ND FLR);  Service: Endoscopy;  Laterality: N/A;    ERCP N/A 01/17/2023    Procedure: ERCP (ENDOSCOPIC RETROGRADE CHOLANGIOPANCREATOGRAPHY);  Surgeon: Opal Prado MD;  Location: [facility] (2ND FLR);  Service: Endoscopy;  Laterality: N/A;    ERCP N/A 01/16/2023    Procedure: ERCP (ENDOSCOPIC RETROGRADE CHOLANGIOPANCREATOGRAPHY);  Surgeon: Opal Prado MD;  Location: [facility] (2ND FLR);  Service: Endoscopy;  Laterality: N/A;    INCISION AND DRAINAGE, PILONIDAL CYST, SIMPLE  02/19/2021    LAPAROSCOPIC CHOLECYSTECTOMY N/A 01/18/2023    Procedure: CHOLECYSTECTOMY, LAPAROSCOPIC;  Surgeon: Satish Romano MD;  [facility];  Service: General;  Laterality: N/A;    TONSILLECTOMY         Allergies:   Review of patient's allergies indicates:   Allergen Reactions    Iodine and iodide containing products     Shellfish containing products         Home Meds:   Prior to Admission medications    Medication Sig Start Date End Date Taking? Authorizing Provider   azelastine (ASTELIN) 137 mcg (0.1 %) nasal spray 2 sprays (274 mcg total) by Nasal route 2 (two) times daily. 5/2/19 5/1/20  Maya Lopez, CHUN   methocarbamoL (ROBAXIN)  500 MG Tab Take 1 tablet (500 mg total) by mouth nightly as needed (back pain). 8/7/22   Sydnie Alarcon, NP-C       Anticoagulation/Antiplatelet Meds: no anticoagulation    Review of Systems:   Hematological: no known coagulopathies  Respiratory: no shortness of breath  Cardiovascular: no chest pain  Gastrointestinal: no abdominal pain  Genitourinary: no dysuria  Musculoskeletal: negative  Neurological: no TIA or stroke symptoms     Physical Exam:  Temp: 98 °F (36.7 °C) (03/14/23 1324)  Pulse: 79 (03/14/23 1407)  Resp: 16 (03/14/23 1407)  BP: 116/68 (03/14/23 1407)  SpO2: 96 % (03/14/23 1407)    General: NAD  HEENT: Normocephalic, sclera anicteric, oropharynx clear  Neck: Supple, no palpable lymphadenopathy  Heart: RRR  Lungs: Symmetric excursions, breathing unlabored  Abd: NTND, soft  Extremities: DAI  Neuro: Gross nonfocal    Laboratory:  Lab Results   Component Value Date    INR 1.1 01/14/2023       Lab Results   Component Value Date    WBC 9.61 01/19/2023    HGB 12.6 01/19/2023    HCT 39.6 01/19/2023    MCV 90 01/19/2023     01/19/2023      Lab Results   Component Value Date    GLU 92 01/27/2023     01/27/2023    K 4.4 01/27/2023     01/27/2023    CO2 21 (L) 01/27/2023    BUN 12 01/27/2023    CREATININE 0.8 01/27/2023    CALCIUM 10.1 01/27/2023    ALT 78 (H) 01/27/2023    AST 27 01/27/2023    ALBUMIN 3.6 01/27/2023    BILITOT 1.0 01/27/2023       Imaging:  US thyroid 2/16/23 reviewed.    Assessment/Plan:  48 y.o. female with a left thyroid nodule that meets criteria for FNA. Will undergo today.    Sedation: None    Risks (including, but not limited to, pain, bleeding, infection, damage to nearby structures, treatment failure/recurrence, and the need for additional procedures), potential benefits, and alternatives were discussed with the patient. All questions were answered to the best of my abilities. The patient wishes to proceed. Written informed consent was obtained.      Freddy  [facility]  Pager [phone]

## 2023-03-16 LAB
ADEQUACY: ABNORMAL
FINAL PATHOLOGIC DIAGNOSIS: ABNORMAL
Lab: ABNORMAL

## 2023-05-24 ENCOUNTER — CLINICAL SUPPORT (OUTPATIENT)
Dept: ENDOSCOPY | Facility: HOSPITAL | Age: 48
End: 2023-05-24
Attending: INTERNAL MEDICINE
Payer: COMMERCIAL

## 2023-05-24 VITALS — BODY MASS INDEX: 40.81 KG/M2 | WEIGHT: 260 LBS | HEIGHT: 67 IN

## 2023-05-24 DIAGNOSIS — Z12.11 SCREEN FOR COLON CANCER: ICD-10-CM

## 2023-05-24 RX ORDER — SODIUM, POTASSIUM,MAG SULFATES 17.5-3.13G
1 SOLUTION, RECONSTITUTED, ORAL ORAL DAILY
Qty: 1 KIT | Refills: 0 | Status: SHIPPED | OUTPATIENT
Start: 2023-05-24 | End: 2023-05-26

## 2023-07-07 ENCOUNTER — ANESTHESIA (OUTPATIENT)
Dept: ENDOSCOPY | Facility: HOSPITAL | Age: 48
End: 2023-07-07
Payer: COMMERCIAL

## 2023-07-07 ENCOUNTER — HOSPITAL ENCOUNTER (OUTPATIENT)
Facility: HOSPITAL | Age: 48
Discharge: HOME | End: 2023-07-07
Attending: COLON & RECTAL SURGERY | Admitting: COLON & RECTAL SURGERY
Payer: COMMERCIAL

## 2023-07-07 ENCOUNTER — ANESTHESIA EVENT (OUTPATIENT)
Dept: ENDOSCOPY | Facility: HOSPITAL | Age: 48
End: 2023-07-07
Payer: COMMERCIAL

## 2023-07-07 VITALS
OXYGEN SATURATION: 99 % | HEART RATE: 70 BPM | HEIGHT: 67 IN | WEIGHT: 260 LBS | DIASTOLIC BLOOD PRESSURE: 84 MMHG | SYSTOLIC BLOOD PRESSURE: 115 MMHG | TEMPERATURE: 98 F | BODY MASS INDEX: 40.81 KG/M2 | RESPIRATION RATE: 16 BRPM

## 2023-07-07 DIAGNOSIS — Z80.0 FAMILY HISTORY OF COLON CANCER: ICD-10-CM

## 2023-07-07 DIAGNOSIS — Z80.0 FAMILY HX OF COLON CANCER: Primary | ICD-10-CM

## 2023-07-07 LAB
B-HCG UR QL: NEGATIVE
CTP QC/QA: YES

## 2023-07-07 PROCEDURE — 37000009 HC ANESTHESIA EA ADD 15 MINS: Performed by: COLON & RECTAL SURGERY

## 2023-07-07 PROCEDURE — 81025 URINE PREGNANCY TEST: CPT | Performed by: COLON & RECTAL SURGERY

## 2023-07-07 PROCEDURE — E9220 PRA ENDO ANESTHESIA: HCPCS | Mod: 33,,, | Performed by: NURSE ANESTHETIST, CERTIFIED REGISTERED

## 2023-07-07 PROCEDURE — 45380 COLONOSCOPY AND BIOPSY: CPT | Mod: PT | Performed by: COLON & RECTAL SURGERY

## 2023-07-07 PROCEDURE — 25000003 PHARM REV CODE 250: Performed by: NURSE ANESTHETIST, CERTIFIED REGISTERED

## 2023-07-07 PROCEDURE — 88305 TISSUE EXAM BY PATHOLOGIST: CPT | Mod: 26,,, | Performed by: PATHOLOGY

## 2023-07-07 PROCEDURE — 88305 TISSUE EXAM BY PATHOLOGIST: CPT | Performed by: PATHOLOGY

## 2023-07-07 PROCEDURE — 27201012 HC FORCEPS, HOT/COLD, DISP: Performed by: COLON & RECTAL SURGERY

## 2023-07-07 PROCEDURE — 25000003 PHARM REV CODE 250: Performed by: COLON & RECTAL SURGERY

## 2023-07-07 PROCEDURE — 45380 PR COLONOSCOPY,BIOPSY: ICD-10-PCS | Mod: 33,,, | Performed by: COLON & RECTAL SURGERY

## 2023-07-07 PROCEDURE — E9220 PRA ENDO ANESTHESIA: ICD-10-PCS | Mod: 33,,, | Performed by: NURSE ANESTHETIST, CERTIFIED REGISTERED

## 2023-07-07 PROCEDURE — 88305 TISSUE EXAM BY PATHOLOGIST: ICD-10-PCS | Mod: 26,,, | Performed by: PATHOLOGY

## 2023-07-07 PROCEDURE — 45380 COLONOSCOPY AND BIOPSY: CPT | Mod: 33,,, | Performed by: COLON & RECTAL SURGERY

## 2023-07-07 PROCEDURE — 63600175 PHARM REV CODE 636 W HCPCS: Performed by: NURSE ANESTHETIST, CERTIFIED REGISTERED

## 2023-07-07 PROCEDURE — 37000008 HC ANESTHESIA 1ST 15 MINUTES: Performed by: COLON & RECTAL SURGERY

## 2023-07-07 RX ORDER — PROPOFOL 10 MG/ML
VIAL (ML) INTRAVENOUS CONTINUOUS PRN
Status: DISCONTINUED | OUTPATIENT
Start: 2023-07-07 | End: 2023-07-07

## 2023-07-07 RX ORDER — PROPOFOL 10 MG/ML
VIAL (ML) INTRAVENOUS
Status: DISCONTINUED | OUTPATIENT
Start: 2023-07-07 | End: 2023-07-07

## 2023-07-07 RX ORDER — SODIUM CHLORIDE 9 MG/ML
INJECTION, SOLUTION INTRAVENOUS CONTINUOUS
Status: DISCONTINUED | OUTPATIENT
Start: 2023-07-07 | End: 2023-07-07 | Stop reason: HOSPADM

## 2023-07-07 RX ORDER — LIDOCAINE HYDROCHLORIDE 20 MG/ML
INJECTION INTRAVENOUS
Status: DISCONTINUED | OUTPATIENT
Start: 2023-07-07 | End: 2023-07-07

## 2023-07-07 RX ADMIN — PROPOFOL 70 MG: 10 INJECTION, EMULSION INTRAVENOUS at 09:07

## 2023-07-07 RX ADMIN — PROPOFOL 30 MG: 10 INJECTION, EMULSION INTRAVENOUS at 09:07

## 2023-07-07 RX ADMIN — SODIUM CHLORIDE: 0.9 INJECTION, SOLUTION INTRAVENOUS at 08:07

## 2023-07-07 RX ADMIN — PROPOFOL 150 MCG/KG/MIN: 10 INJECTION, EMULSION INTRAVENOUS at 09:07

## 2023-07-07 RX ADMIN — LIDOCAINE HYDROCHLORIDE 50 MG: 20 INJECTION INTRAVENOUS at 09:07

## 2023-07-07 NOTE — PROVATION PATIENT INSTRUCTIONS
Discharge Summary/Instructions after an Endoscopic Procedure  Patient Name: Brooke Moss  Patient MRN: [id number]  Patient YOB: 1975 Friday, July 7, 2023  Zeeshan Wolfe MD  Dear patient,  As a result of recent federal legislation (The Federal Cures Act), you may   receive lab or pathology results from your procedure in your [facility]   account before your physician is able to contact you. Your physician or   their representative will relay the results to you with their   recommendations at their soonest availability.  Thank you,  RESTRICTIONS:  During your procedure today, you received medications for sedation.  These   medications may affect your judgment, balance and coordination.  Therefore,   for 24 hours, you have the following restrictions:   - DO NOT drive a car, operate machinery, make legal/financial decisions,   sign important papers or drink alcohol.    ACTIVITY:  Today: no heavy lifting, straining or running due to procedural   sedation/anesthesia.  The following day: return to full activity including work.  DIET:  Eat and drink normally unless instructed otherwise.     TREATMENT FOR COMMON SIDE EFFECTS:  - Mild abdominal pain, nausea, belching, bloating or excessive gas:  rest,   eat lightly and use a heating pad.  - Sore Throat: treat with throat lozenges and/or gargle with warm salt   water.  - Because air was used during the procedure, expelling large amounts of air   from your rectum or belching is normal.  - If a bowel prep was taken, you may not have a bowel movement for 1-3 days.    This is normal.  SYMPTOMS TO WATCH FOR AND REPORT TO YOUR PHYSICIAN:  1. Abdominal pain or bloating, other than gas cramps.  2. Chest pain.  3. Back pain.  4. Signs of infection such as: chills or fever occurring within 24 hours   after the procedure.  5. Rectal bleeding, which would show as bright red, maroon, or black stools.   (A tablespoon of blood from the rectum is not serious, especially if    hemorrhoids are present.)  6. Vomiting.  7. Weakness or dizziness.  GO DIRECTLY TO THE NEAREST EMERGENCY ROOM IF YOU HAVE ANY OF THE FOLLOWING:      Difficulty breathing              Chills and/or fever over 101 F   Persistent vomiting and/or vomiting blood   Severe abdominal pain   Severe chest pain   Black, tarry stools   Bleeding- more than one tablespoon   Any other symptom or condition that you feel may need urgent attention  Your doctor recommends these additional instructions:  If any biopsies were taken, your doctors clinic will contact you in 1 to 2   weeks with any results.  - Discharge patient to home.   - High fiber diet.   - Continue present medications.   - Await pathology results.   - Repeat colonoscopy date to be determined after pending pathology results   are reviewed for surveillance based on pathology results.   - Patient has a contact number available for emergencies.  The signs and   symptoms of potential delayed complications were discussed with the   patient.  Return to normal activities tomorrow.  Written discharge   instructions were provided to the patient.  For questions, problems or results please call your physician - Zeeshan Wolfe MD at Work:  [phone].  [facility], EMERGENCY ROOM PHONE NUMBER: [phone]  IF A COMPLICATION OR EMERGENCY SITUATION ARISES AND YOU ARE UNABLE TO REACH   YOUR PHYSICIAN - GO DIRECTLY TO THE EMERGENCY ROOM.  Zeeshan Wolfe MD  7/7/2023 10:23:29 AM  This report has been verified and signed electronically.  Dear patient,  As a result of recent federal legislation (The Federal Cures Act), you may   receive lab or pathology results from your procedure in your [facility]   account before your physician is able to contact you. Your physician or   their representative will relay the results to you with their   recommendations at their soonest availability.  Thank you,  PROVATION

## 2023-07-07 NOTE — TRANSFER OF CARE
"Anesthesia Transfer of Care Note    Patient: Brooke Moss    Procedure(s) Performed: Procedure(s) (LRB):  COLONOSCOPY (N/A)    Patient location: GI    Anesthesia Type: general    Transport from OR: Transported from OR on room air with adequate spontaneous ventilation    Post pain: adequate analgesia    Post assessment: no apparent anesthetic complications and tolerated procedure well    Post vital signs: stable    Level of consciousness: awake, alert and oriented    Nausea/Vomiting: no nausea/vomiting    Complications: none    Transfer of care protocol was followed      Last vitals:   Visit Vitals  /65   Pulse 88   Temp 36.7 °C (98 °F)   Resp 16   Ht 5' 7" (1.702 m)   Wt 117.9 kg (260 lb)   SpO2 99%   Breastfeeding No   BMI 40.72 kg/m²     "

## 2023-07-07 NOTE — ANESTHESIA PREPROCEDURE EVALUATION
07/07/2023  Brooke Moss is a 48 y.o., female.    Active Problem List with Overview Notes    Diagnosis Date Noted    Severe obesity (BMI >= 40) 01/19/2023    Choledocholithiasis with obstruction 01/18/2023     Past Surgical History:   Procedure Laterality Date    ADENOIDECTOMY      CHOLECYSTECTOMY  1/18/2023    ENDOSCOPIC ULTRASOUND OF UPPER GASTROINTESTINAL TRACT N/A 01/17/2023    Procedure: ULTRASOUND, UPPER GI TRACT, ENDOSCOPIC;  Surgeon: Opal Prado MD;  Location: [facility] ENDO (2ND FLR);  Service: Endoscopy;  Laterality: N/A;    ENDOSCOPIC ULTRASOUND OF UPPER GASTROINTESTINAL TRACT N/A 01/16/2023    Procedure: ULTRASOUND, UPPER GI TRACT, ENDOSCOPIC;  Surgeon: Opal Prado MD;  Location: [facility] ENDO (2ND FLR);  Service: Endoscopy;  Laterality: N/A;    ERCP N/A 01/17/2023    Procedure: ERCP (ENDOSCOPIC RETROGRADE CHOLANGIOPANCREATOGRAPHY);  Surgeon: Opal Prado MD;  Location: [facility] ENDO (2ND FLR);  Service: Endoscopy;  Laterality: N/A;    ERCP N/A 01/16/2023    Procedure: ERCP (ENDOSCOPIC RETROGRADE CHOLANGIOPANCREATOGRAPHY);  Surgeon: Opal Prado MD;  Location: [facility] ENDO (2ND FLR);  Service: Endoscopy;  Laterality: N/A;    INCISION AND DRAINAGE, PILONIDAL CYST, SIMPLE  02/19/2021    LAPAROSCOPIC CHOLECYSTECTOMY N/A 01/18/2023    Procedure: CHOLECYSTECTOMY, LAPAROSCOPIC;  Surgeon: Satish Romano MD;  Location: [facility] OR 2ND FLR;  Service: General;  Laterality: N/A;    TONSILLECTOMY         Pre-op Assessment    I have reviewed the Patient Summary Reports.    I have reviewed the NPO Status.   I have reviewed the Medications.     Review of Systems  Anesthesia Hx:  No problems with previous Anesthesia  History of prior surgery of interest to airway management or planning: Denies Family Hx of Anesthesia complications.   Denies Personal Hx of Anesthesia complications.    Social:  Non-Smoker, Social Alcohol Use    Hematology/Oncology:  Hematology Normal   Oncology Normal     EENT/Dental:EENT/Dental Normal   Cardiovascular:  Cardiovascular Normal     Pulmonary:  Pulmonary Normal  Denies COPD.  Denies Asthma.  Denies Sleep Apnea.    Renal/:  Renal/ Normal     Hepatic/GI:  Hepatic/GI Normal    Musculoskeletal:  Musculoskeletal Normal    Neurological:  Neurology Normal Denies TIA. Denies Seizures.    Endocrine:  Endocrine Normal  Obesity / BMI > 30  Dermatological:  Skin Normal    Psych:  Psychiatric Normal           Physical Exam  General: Well nourished, Cooperative, Alert and Oriented    Airway:  Mallampati: II   Mouth Opening: Normal  TM Distance: Normal  Tongue: Normal  Neck ROM: Normal ROM    Dental:  Intact    Chest/Lungs:  Normal Respiratory Rate    Heart:  Rate: Normal        Anesthesia Plan  Type of Anesthesia, risks & benefits discussed:    Anesthesia Type: Gen Natural Airway  Intra-op Monitoring Plan: Standard ASA Monitors  Post Op Pain Control Plan: multimodal analgesia  Induction:  IV  Informed Consent: Informed consent signed with the Patient and all parties understand the risks and agree with anesthesia plan.  All questions answered.   ASA Score: 2  Day of Surgery Review of History & Physical: H&P Update referred to the surgeon/provider.    Ready For Surgery From Anesthesia Perspective.     .

## 2023-07-07 NOTE — ANESTHESIA POSTPROCEDURE EVALUATION
Anesthesia Post Evaluation    Patient: Brooke Moss    Procedure(s) Performed: Procedure(s) (LRB):  COLONOSCOPY (N/A)    Final Anesthesia Type: general      Patient location during evaluation: GI PACU  Patient participation: Yes- Able to Participate  Level of consciousness: awake and alert  Post-procedure vital signs: reviewed and stable  Pain management: adequate  Airway patency: patent    PONV status at discharge: No PONV  Anesthetic complications: no      Cardiovascular status: hemodynamically stable  Respiratory status: spontaneous ventilation and unassisted  Hydration status: euvolemic  Follow-up not needed.          Vitals Value Taken Time   /84 07/07/23 1050   Temp 36.7 °C (98 °F) 07/07/23 1022   Pulse 70 07/07/23 1050   Resp 16 07/07/23 1050   SpO2 99 % 07/07/23 1050         Event Time   Out of Recovery 10:57:48         Pain/Krystian Score: Krystian Score: 10 (7/7/2023 10:23 AM)

## 2023-07-07 NOTE — H&P
Procedure : Colonoscopy    Indication(s):  Family hx of CRC (MGF and PGF, both late 50's)    Review of patient's allergies indicates:   Allergen Reactions    Iodine and iodide containing products     Shellfish containing products        History reviewed. No pertinent past medical history.    Prior to Admission medications    Medication Sig Start Date End Date Taking? Authorizing Provider   azelastine (ASTELIN) 137 mcg (0.1 %) nasal spray 2 sprays (274 mcg total) by Nasal route 2 (two) times daily. 5/2/19 5/1/20  Maya Lopez, CHUN   methocarbamoL (ROBAXIN) 500 MG Tab Take 1 tablet (500 mg total) by mouth nightly as needed (back pain). 8/7/22   Sydnie Alarcon NP-C       Sedation Problems: NO    Family History   Problem Relation Age of Onset    Cancer Maternal Grandmother     Colon cancer Maternal Grandfather     Colon cancer Paternal Grandfather        Fam Hx of Sedation Problems: NO    Social History     Socioeconomic History    Marital status: Single   Tobacco Use    Smoking status: Every Day     Packs/day: 0.25     Types: Cigarettes, Vaping with nicotine    Smokeless tobacco: Never   Substance and Sexual Activity    Alcohol use: Yes     Comment: once a week or two at most.    Drug use: Yes     Types: Marijuana    Sexual activity: Not Currently     Birth control/protection: None       Review of Systems -     Respiratory ROS: no cough, shortness of breath, or wheezing  Cardiovascular ROS: no chest pain or dyspnea on exertion  Gastrointestinal ROS: no abdominal pain, change in bowel habits, or black or bloody stools  Musculoskeletal ROS: negative  Neurological ROS: no TIA or stroke symptoms        Physical Exam:  General: no distress  Head: normocephalic  Airway:  normal oropharynx, airway normal  Neck: supple, symmetrical, trachea midline  Lungs:  normal respiratory effort  Heart: regular rate and rhythm  Abdomen: soft, non-tender non-distented; bowel sounds normal; no masses,  no  organomegaly  Extremities: no cyanosis or edema, or clubbing       Deep Sedation: Mallampati Score per anesthesia     SedationPlan :Moderate     ASA : II    Patient is medically cleared for anesthesia.    Anesthesia/Surgery risks, benefits and alternative options discussed and understood by patient/family.

## 2023-07-11 LAB
FINAL PATHOLOGIC DIAGNOSIS: NORMAL
GROSS: NORMAL
Lab: NORMAL

## 2024-02-23 DIAGNOSIS — Z12.31 OTHER SCREENING MAMMOGRAM: ICD-10-CM

## 2024-10-21 ENCOUNTER — ON-DEMAND VIRTUAL (OUTPATIENT)
Dept: URGENT CARE | Facility: CLINIC | Age: 49
End: 2024-10-21
Payer: COMMERCIAL

## 2024-10-21 DIAGNOSIS — J06.9 URI WITH COUGH AND CONGESTION: Primary | ICD-10-CM

## 2024-10-21 PROCEDURE — 99213 OFFICE O/P EST LOW 20 MIN: CPT | Mod: 95,,, | Performed by: NURSE PRACTITIONER

## 2024-10-21 RX ORDER — PREDNISONE 20 MG/1
20 TABLET ORAL DAILY
Qty: 3 TABLET | Refills: 0 | Status: SHIPPED | OUTPATIENT
Start: 2024-10-21 | End: 2024-10-24

## 2024-10-21 RX ORDER — BENZONATATE 100 MG/1
100 CAPSULE ORAL 3 TIMES DAILY PRN
Qty: 30 CAPSULE | Refills: 0 | Status: SHIPPED | OUTPATIENT
Start: 2024-10-21 | End: 2024-10-31

## 2024-10-21 NOTE — PROGRESS NOTES
Subjective:      Patient ID: Brooke Moss is a 49 y.o. female.    Vitals:  vitals were not taken for this visit.     Chief Complaint: Cough      Visit Type: TELE AUDIOVISUAL - This visit was conducted virtually based on  subjective information and limited objective exam    Present with the patient at the time of consultation: TELEMED PRESENT WITH PATIENT: None  Two patient identifiers used to verify patient- saying out date of birth and full name.       History reviewed. No pertinent past medical history.  Past Surgical History:   Procedure Laterality Date    ADENOIDECTOMY      CHOLECYSTECTOMY  1/18/2023    COLONOSCOPY N/A 7/7/2023    Procedure: COLONOSCOPY;  Surgeon: Zeeshan Wolfe MD;  Location: [facility] ENDO (4TH FLR);  Service: Colon and Rectal;  Laterality: N/A;  suprep instr. to portal. Referral Dr. Medeiros.    ENDOSCOPIC ULTRASOUND OF UPPER GASTROINTESTINAL TRACT N/A 01/17/2023    Procedure: ULTRASOUND, UPPER GI TRACT, ENDOSCOPIC;  Surgeon: Opal Prado MD;  Location: [facility] ENDO (2ND FLR);  Service: Endoscopy;  Laterality: N/A;    ENDOSCOPIC ULTRASOUND OF UPPER GASTROINTESTINAL TRACT N/A 01/16/2023    Procedure: ULTRASOUND, UPPER GI TRACT, ENDOSCOPIC;  Surgeon: Opal Prado MD;  Location: [facility] ENDO (2ND FLR);  Service: Endoscopy;  Laterality: N/A;    ERCP N/A 01/17/2023    Procedure: ERCP (ENDOSCOPIC RETROGRADE CHOLANGIOPANCREATOGRAPHY);  Surgeon: Opal Prado MD;  Location: [facility] ENDO (2ND FLR);  Service: Endoscopy;  Laterality: N/A;    ERCP N/A 01/16/2023    Procedure: ERCP (ENDOSCOPIC RETROGRADE CHOLANGIOPANCREATOGRAPHY);  Surgeon: Opal Prado MD;  Location: [facility] ENDO (2ND FLR);  Service: Endoscopy;  Laterality: N/A;    INCISION AND DRAINAGE, PILONIDAL CYST, SIMPLE  02/19/2021    LAPAROSCOPIC CHOLECYSTECTOMY N/A 01/18/2023    Procedure: CHOLECYSTECTOMY, LAPAROSCOPIC;  Surgeon: Satish Romano MD;  Location: [facility] OR 2ND FLR;  Service: General;  Laterality: N/A;    TONSILLECTOMY       Review of  patient's allergies indicates:   Allergen Reactions    Iodine and iodide containing products     Shellfish containing products      No current outpatient medications on file prior to visit.     No current facility-administered medications on file prior to visit.     Family History   Problem Relation Name Age of Onset    Cancer Maternal Grandmother Maria Isabel     Colon cancer Maternal Grandfather      Colon cancer Paternal Grandfather         Medications Ordered                [facility]"[employer] [facility] [id number] - [place] - [address] [facility] [address]   [address] [address]    Telephone: [phone]   Fax: [phone]   Hours: Not open 24 hours                         E-Prescribed (1 of 2)              benzonatate (TESSALON) 100 MG capsule    Sig: Take 1 capsule (100 mg total) by mouth 3 (three) times daily as needed for Cough.       Start: 10/21/24     Quantity: 30 capsule Refills: 0                               Unspecified Transmission Method (1 of 2)              predniSONE (DELTASONE) 20 MG tablet    Sig: Take 1 tablet (20 mg total) by mouth once daily. for 3 days       Start: 10/21/24     Quantity: 3 tablet Refills: 0                           Ohs Peq Odvv Intake    10/21/2024  2:33 PM CDT - Filed by Patient   What is your current physical address in the event of a medical emergency? [address]   Are you able to take your vital signs? No   Please attach any relevant images or files    Is your employer contracted with [facility]? No         48 yo female with c/o chest congestion, body aches and chills, and sore throat. She states she took vitamin c to help. She took tylenol for chills. She states she also took mucinex. She denies sinus pressure.         Constitution: Positive for chills and generalized weakness. Negative for fever.   HENT:  Positive for congestion, postnasal drip and sore throat.    Cardiovascular:  Negative for  sob on exertion.   Respiratory:  Negative for cough, sputum production, shortness of breath and wheezing.    Musculoskeletal:  Positive for muscle ache.   Allergic/Immunologic: Positive for sneezing.   Neurological:  Negative for headaches.        Objective:   The physical exam was conducted virtually.  LOCATION OF PATIENT [place]  AAO x 3 ; no acute distress noted; appearance normal; mood and behavior normal; thought process normal  Head- normocephalic  Nose- appears normal, no discharge or erythema  Eyes- pupils appear normal in size, no drainage, no erythema  Ears- normal appearing; no discharge, no erythema  Mouth- appears normal  Oropharynx- no erythema, lesions  Lungs- breathing at a normal rate, no acute distress noted  Heart- no reports of tachycardia, palpitations, chest pain  Abdomen- non distended, non tender reported by patient  Skin- warm and dry, no erythema or edema noted by patient or visualized  Psych- as above; no si/hi      Assessment:     1. URI with cough and congestion        Plan:     Patient Instructions      Please follow up with your Primary care provider within 2-5 days if your signs and symptoms have not resolved or worsen.  The usual course of cold symptoms are 10-14 days.      If your condition worsens or fails to improve we recommend that you receive another evaluation at the emergency room immediately or contact your primary medical clinic to discuss your concerns.      You must understand that you have received an Telemedicine treatment only and that you may be released before all of your medical problems are known or treated.   You, the patient, will arrange for follow up care as instructed.      Tylenol or Ibuprofen can also be used as directed for pain/fever unless you have an allergy to them or medical condition such as stomach ulcers, kidney or liver disease or blood thinners etc for which you should not be taking these type of medications.      Take over the counter  "cough medication as directed as needed for cough.  You should avoid medications with pseudoephedrine or phenylephrine (any medication with "D") if you have high blood pressure as this can cause an elevation in your blood pressure. Instead consider Corcidin HBP as needed to prevent an elevated blood pressure.      Natural remedies of symptoms (as needed) include humidification, saline nasal sprays, and/or steamy showers.  Increase fluids, warm tea with honey, cough drops as needed.  You may also use salt water gargles for sore throat.     IF you received a steroid shot today - As discussed, this can elevate your blood pressure, elevate your blood sugar, water weight gain, nervous energy, redness to the face and dimpling of the skin at the injection site.          Thank you for choosing [facility]!    Our goal in the [facility] is to always provide outstanding medical care. You may receive a survey by mail or e-mail in the next week regarding your experience today. We would greatly appreciate you completing and returning the survey. Your feedback provides us with a way to recognize our staff who provide very good care, and it helps us learn how to improve when your experience was below our aspiration of excellence.         We appreciate you trusting us with your medical care. We hope you feel better soon. We will be happy to take care of you for all of your future medical needs.    You must understand that you've received an Urgent Care treatment only and that you may be released before all your medical problems are known or treated. You, the patient, will arrange for follow up care as instructed.    Follow up with your PCP or specialty clinic as directed in the next 1-2 weeks if not improved or as needed.  You can call [phone] to schedule an appointment with the appropriate provider.    If your condition worsens we recommend that you receive another evaluation in person, " with your primary care provider, urgent care or at the emergency room immediately or contact your primary medical clinics after hours call service to discuss your concerns.         URI with cough and congestion  -     benzonatate (TESSALON) 100 MG capsule; Take 1 capsule (100 mg total) by mouth 3 (three) times daily as needed for Cough.  Dispense: 30 capsule; Refill: 0  -     predniSONE (DELTASONE) 20 MG tablet; Take 1 tablet (20 mg total) by mouth once daily. for 3 days  Dispense: 3 tablet; Refill: 0

## 2025-03-24 ENCOUNTER — PATIENT MESSAGE (OUTPATIENT)
Dept: INTERNAL MEDICINE | Facility: CLINIC | Age: 50
End: 2025-03-24

## 2025-04-17 ENCOUNTER — HOSPITAL ENCOUNTER (OUTPATIENT)
Dept: RADIOLOGY | Facility: HOSPITAL | Age: 50
Discharge: HOME | End: 2025-04-17
Attending: INTERNAL MEDICINE
Payer: COMMERCIAL

## 2025-04-17 DIAGNOSIS — Z12.31 OTHER SCREENING MAMMOGRAM: ICD-10-CM

## 2025-04-17 PROCEDURE — 77067 SCR MAMMO BI INCL CAD: CPT | Mod: TC

## 2025-04-22 ENCOUNTER — RESULTS FOLLOW-UP (OUTPATIENT)
Dept: INTERNAL MEDICINE | Facility: CLINIC | Age: 50
End: 2025-04-22

## (undated) DEVICE — DRAPE ABDOMINAL TIBURON 14X11

## (undated) DEVICE — SUT 0 VICRYL / UR6 (J603)

## (undated) DEVICE — BLADE SURG CARBON STEEL SZ11

## (undated) DEVICE — ELECTRODE REM PLYHSV RETURN 9

## (undated) DEVICE — BAG TISS RETRV MONARCH 10MM

## (undated) DEVICE — COVER LIGHT HANDLE

## (undated) DEVICE — KIT PROCEDURE STER INLET CLOSU

## (undated) DEVICE — TROCAR ENDOPATH XCEL 5X75MM

## (undated) DEVICE — NDL 18GA X1 1/2 REG BEVEL

## (undated) DEVICE — APPLIER CLIP ENDO LIGAMAX 5MM

## (undated) DEVICE — SCISSOR 5MMX35CM DIRECT DRIVE

## (undated) DEVICE — IRRIGATOR ENDOSCOPY DISP.

## (undated) DEVICE — TROCAR ENDOPATH XCEL 5MM 7.5CM

## (undated) DEVICE — TRAY MINOR GEN SURG OMC

## (undated) DEVICE — TOWEL OR DISP STRL BLUE 4/PK

## (undated) DEVICE — DRAPE CORETEMP FLD WRM 56X62IN

## (undated) DEVICE — DRAPE STERI INSTRUMENT 1018

## (undated) DEVICE — TUBING HF INSUFFLATION W/ FLTR

## (undated) DEVICE — SUT ENDOLOOP PDSII 18 LIGA

## (undated) DEVICE — NDL HYPO REG 25G X 1 1/2

## (undated) DEVICE — ADHESIVE DERMABOND ADVANCED

## (undated) DEVICE — SUT MCRYL PLUS 4-0 PS2 27IN

## (undated) DEVICE — KIT ANTIFOG W/SPONG & FLUID

## (undated) DEVICE — SOL NS 1000CC

## (undated) DEVICE — TROCAR KII BLLN 12MM 10CM